# Patient Record
Sex: FEMALE | Race: WHITE | NOT HISPANIC OR LATINO | Employment: FULL TIME | URBAN - METROPOLITAN AREA
[De-identification: names, ages, dates, MRNs, and addresses within clinical notes are randomized per-mention and may not be internally consistent; named-entity substitution may affect disease eponyms.]

---

## 2017-01-25 ENCOUNTER — TRANSFERRED RECORDS (OUTPATIENT)
Dept: HEALTH INFORMATION MANAGEMENT | Facility: CLINIC | Age: 52
End: 2017-01-25

## 2017-05-17 DIAGNOSIS — F32.81 PMDD (PREMENSTRUAL DYSPHORIC DISORDER): ICD-10-CM

## 2017-05-18 NOTE — TELEPHONE ENCOUNTER
lexapro      Last Written Prescription Date: 11/23/16  Last Fill Quantity: 90, # refills: 1  Last Office Visit with [facility] primary care provider:  11/23/16        Last PHQ-9 score on record=   PHQ-9 SCORE 11/24/2016   Total Score -   Total Score 0

## 2017-05-18 NOTE — TELEPHONE ENCOUNTER
Routing refill request to provider for review/approval because:  Dx of PMDD not on refill protocol.     Irasema Torres RN   [facility] - Triage

## 2017-05-19 RX ORDER — ESCITALOPRAM OXALATE 20 MG/1
TABLET ORAL
Qty: 90 TABLET | Refills: 1 | Status: SHIPPED | OUTPATIENT
Start: 2017-05-19 | End: 2017-11-02

## 2017-08-05 ENCOUNTER — HEALTH MAINTENANCE LETTER (OUTPATIENT)
Age: 52
End: 2017-08-05

## 2017-11-02 DIAGNOSIS — F32.81 PMDD (PREMENSTRUAL DYSPHORIC DISORDER): ICD-10-CM

## 2017-11-02 RX ORDER — ESCITALOPRAM OXALATE 20 MG/1
TABLET ORAL
Qty: 30 TABLET | Refills: 0 | Status: SHIPPED | OUTPATIENT
Start: 2017-11-02 | End: 2018-04-20

## 2017-11-02 NOTE — TELEPHONE ENCOUNTER
Requested Prescriptions   Pending Prescriptions Disp Refills     escitalopram (LEXAPRO) 20 MG tablet 90 tablet 1    SSRIs Protocol Failed    11/2/2017 10:21 AM       Failed - PHQ-9 score less than 5 in past 6 months    Please review PHQ-9 score.          Failed - Recent (6 mo) or future visit with authorizing provider's specialty    Patient had office visit in the last 6 months or has a visit in the next 30 days with authorizing provider.  See chart review.            Passed - Medication is NOT Bupropion    If the medication is Bupropion (Wellbutrin), and the patient is taking for smoking cessation; OK to refill.         Passed - Patient is age 18 or older       Passed - No active pregnancy on record       Passed - No positive pregnancy test in last 12 months        Routing refill request to provider for review/approval because:  DX PMDD not in protocol    Irasema Torres RN   [facility] - Triage

## 2017-11-02 NOTE — LETTER
[facility]   [address] [place] [address]   [phone]      November 10, 2017    Kaylyn Fischer  [address] [place] [address]            Dear Ms. Fischer    Your physician requires an office visit every 12 months in order to monitor your maintenance medication(s).  Your last office visit was on 11/23/16.  We have called into the pharmacy a 1 month refill of your medication(s) until you can be seen by your provider.  Please call the clinic at [phone] to schedule an appointment..        Sincerely,    [facility]

## 2017-11-02 NOTE — TELEPHONE ENCOUNTER
30 day refill ordered. She is due for health maintenance items, needs visit for physical and establish care with new provider.     Kary Patterson MD

## 2017-11-03 NOTE — TELEPHONE ENCOUNTER
left voicemail message for patient to contact Main Clinic Number to schedule.  NTBS: needs visit for physical and establish care with new provider  Mary ANTONIO

## 2017-11-07 NOTE — TELEPHONE ENCOUNTER
2nd message left for pt to call back.    NTBS: needs visit for physical and establish care with new provider    Betsy Flower CMA

## 2017-11-11 DIAGNOSIS — F32.81 PMDD (PREMENSTRUAL DYSPHORIC DISORDER): ICD-10-CM

## 2017-11-13 RX ORDER — ESCITALOPRAM OXALATE 20 MG/1
TABLET ORAL
Qty: 30 TABLET | OUTPATIENT
Start: 2017-11-13

## 2017-11-13 NOTE — TELEPHONE ENCOUNTER
Mediation declined. Medication refilled for 30 tabs, also needs visit for further refills - see refill encounter 11/2/17.     Kary Patterson MD

## 2017-11-13 NOTE — TELEPHONE ENCOUNTER
Routing refill request to provider for review/approval because:  Dx of PMDD not on refill protocol.      Irasema Torres RN   [facility] - Triage

## 2018-04-20 ENCOUNTER — OFFICE VISIT (OUTPATIENT)
Dept: FAMILY MEDICINE | Facility: CLINIC | Age: 53
End: 2018-04-20
Payer: COMMERCIAL

## 2018-04-20 VITALS
OXYGEN SATURATION: 100 % | SYSTOLIC BLOOD PRESSURE: 112 MMHG | HEART RATE: 77 BPM | DIASTOLIC BLOOD PRESSURE: 72 MMHG | BODY MASS INDEX: 24.4 KG/M2 | WEIGHT: 161 LBS | HEIGHT: 68 IN | TEMPERATURE: 97.8 F

## 2018-04-20 DIAGNOSIS — A60.04 HERPES SIMPLEX VULVOVAGINITIS: ICD-10-CM

## 2018-04-20 DIAGNOSIS — Z12.4 CERVICAL CANCER SCREENING: ICD-10-CM

## 2018-04-20 DIAGNOSIS — F32.81 PMDD (PREMENSTRUAL DYSPHORIC DISORDER): Primary | ICD-10-CM

## 2018-04-20 PROCEDURE — 99213 OFFICE O/P EST LOW 20 MIN: CPT | Performed by: NURSE PRACTITIONER

## 2018-04-20 PROCEDURE — 87624 HPV HI-RISK TYP POOLED RSLT: CPT | Performed by: NURSE PRACTITIONER

## 2018-04-20 PROCEDURE — G0145 SCR C/V CYTO,THINLAYER,RESCR: HCPCS | Performed by: NURSE PRACTITIONER

## 2018-04-20 RX ORDER — ESCITALOPRAM OXALATE 20 MG/1
TABLET ORAL
Qty: 90 TABLET | Refills: 3 | Status: SHIPPED | OUTPATIENT
Start: 2018-04-20 | End: 2019-02-27

## 2018-04-20 RX ORDER — VALACYCLOVIR HYDROCHLORIDE 500 MG/1
500 TABLET, FILM COATED ORAL 2 TIMES DAILY
Qty: 12 TABLET | Refills: 3 | Status: SHIPPED | OUTPATIENT
Start: 2018-04-20 | End: 2018-09-17

## 2018-04-20 ASSESSMENT — PAIN SCALES - GENERAL: PAINLEVEL: NO PAIN (0)

## 2018-04-20 NOTE — Clinical Note
Please abstract the following data from this visit with this patient into the appropriate field in Epic:  Mammogram done on this date: 1/25/17 (approximately), by this group: [facility], results were Normal.  See Care Everywhere

## 2018-04-20 NOTE — MR AVS SNAPSHOT
"              After Visit Summary   4/20/2018    Kaylyn Fischer    MRN: [id number]           Patient Information     Date Of Birth          1965        Visit Information        Provider Department      4/20/2018 1:00 PM Susana Bernstein APRN CNP [facility]        Today's Diagnoses     Cervical cancer screening    -  1    PMDD (premenstrual dysphoric disorder)        Herpes simplex vulvovaginitis           Follow-ups after your visit        Who to contact     If you have questions or need follow up information about today's clinic visit or your schedule please contact [facility] directly at [phone].  Normal or non-critical lab and imaging results will be communicated to you by MyChart, letter or phone within 4 business days after the clinic has received the results. If you do not hear from us within 7 days, please contact the clinic through [facility] or phone. If you have a critical or abnormal lab result, we will notify you by phone as soon as possible.  Submit refill requests through [facility] or call your pharmacy and they will forward the refill request to us. Please allow 3 business days for your refill to be completed.          Additional Information About Your Visit        MyChart Information     [facility] gives you secure access to your electronic health record. If you see a primary care provider, you can also send messages to your care team and make appointments. If you have questions, please call your primary care clinic.  If you do not have a primary care provider, please call [phone] and they will assist you.        Care EveryWhere ID     This is your Care EveryWhere ID. This could be used by other organizations to access your [facility] medical records  [id number]        Your Vitals Were     Pulse Temperature Height Pulse Oximetry Breastfeeding? BMI (Body Mass Index)    77 97.8  F (36.6  C) (Oral) 5' 8\" (1.727 m) 100% No 24.48 kg/m2       " Blood Pressure from Last 3 Encounters:   04/20/18 112/72   11/23/16 110/75   01/29/16 116/74    Weight from Last 3 Encounters:   04/20/18 161 lb (73 kg)   11/23/16 156 lb (70.8 kg)   01/28/16 156 lb (70.8 kg)              We Performed the Following     HPV High Risk Types DNA Cervical     Pap imaged thin layer screen with HPV - recommended age 30 - 65 years (select HPV order below)          Where to get your medicines      These medications were sent to [facility] - [address], [facility] [address]     Phone:  [phone]     escitalopram 20 MG tablet    valACYclovir 500 MG tablet          Primary Care Provider Office Phone # Fax #    Susana Bernstein APRNICOLE [facility] [phone]       [address] [facility] [address]        Equal Access to Services     JOCELYN CARLOS : Hadii ghassan ku hadasho Soomaali, waaxda luqadaha, qaybta kaalmada adeegyada, juan j brooks . So [facility] [phone].    ATENCIÓN: Si habla español, tiene a wilkinson disposición servicios gratuitos de asistencia lingüística. Llame al [phone].    We comply with applicable federal civil rights laws and [place] laws. We do not discriminate on the basis of race, color, national origin, age, disability, sex, sexual orientation, or gender identity.            Thank you!     Thank you for choosing [facility]  for your care. Our goal is always to provide you with excellent care. Hearing back from our patients is one way we can continue to improve our services. Please take a few minutes to complete the written survey that you may receive in the mail after your visit with us. Thank you!             Your Updated Medication List - Protect others around you: Learn how to safely use, store and throw away your medicines at www.disposemymeds.org.          This list is accurate as of 4/20/18  1:27 PM.  Always use your most recent  med list.                   Brand Name Dispense Instructions for use Diagnosis    calcium carbonate-vitamin D 500-400 MG-UNIT Tabs per tablet     180 tablet    Take 1 tablet by mouth 2 times daily        escitalopram 20 MG tablet    LEXAPRO    90 tablet    Take 1 tablet by mouth  daily    PMDD (premenstrual dysphoric disorder)       valACYclovir 500 MG tablet    VALTREX    12 tablet    Take 1 tablet (500 mg) by mouth 2 times daily X 3 days per out break    Herpes simplex vulvovaginitis

## 2018-04-20 NOTE — Clinical Note
Patient had a mammogram in 2017 through [facility]. It was already abstracted into chart but her HM is showing she is still due for mammogram. Can you update please?

## 2018-04-20 NOTE — PROGRESS NOTES
SUBJECTIVE:   Kaylyn Fischer is a 52 year old female who presents to clinic today for the following health issues:      Medication Followup of Lexapro and Valtrex    Taking Medication as prescribed: yes    Side Effects:  None    Medication Helping Symptoms:  yes     She is here to establish care  Hx PMDD for which she takes Lexapro  Has been on for several years  Tolerates without side effects  Tried 10 mg with 20 mg around cycle but has found consistent use of 20 mg daily works better  Denies depression or anxiety  PHQ-2 Score:     PHQ-2 ( 1999 Pfizer) 4/20/2018 11/23/2016   Q1: Little interest or pleasure in doing things 0 0   Q2: Feeling down, depressed or hopeless 0 0   PHQ-2 Score 0 0         Also requests refill of Valtrex for infrequent genital herpes outbreaks    She has biometric screening at her work annually  She denies other concerns today      Problem list and histories reviewed & adjusted, as indicated.  Additional history: none    Patient Active Problem List   Diagnosis     CARDIOVASCULAR SCREENING; LDL GOAL LESS THAN 160     PMDD (premenstrual dysphoric disorder)     Herpes simplex vulvovaginitis     Past Surgical History:   Procedure Laterality Date     C NONSPECIFIC PROCEDURE  9/2016    manipulation left shoulder      COLONOSCOPY N/A 1/29/2016     COLONOSCOPY WITH CO2 INSUFFLATION N/A 1/29/2016    NL, repeat 10 years        Social History   Substance Use Topics     Smoking status: Former Smoker     Packs/day: 0.50     Years: 20.00     Types: Cigarettes     Quit date: 2/5/2003     Smokeless tobacco: Never Used     Alcohol use 0.0 oz/week     0 Standard drinks or equivalent per week      Comment: 7 drinks week     Family History   Problem Relation Age of Onset     HEART DISEASE Sister      PVC, tx'd beta blocker         Current Outpatient Prescriptions   Medication Sig Dispense Refill     calcium carbonate-vitamin D 500-400 MG-UNIT TABS tablt Take 1 tablet by mouth 2 times daily 180 tablet 3      "escitalopram (LEXAPRO) 20 MG tablet Take 1 tablet by mouth  daily 90 tablet 3     valACYclovir (VALTREX) 500 MG tablet Take 1 tablet (500 mg) by mouth 2 times daily X 3 days per out break 12 tablet 3       Reviewed and updated as needed this visit by clinical staff  Tobacco  Meds  Med Hx  Surg Hx  Fam Hx  Soc Hx      Reviewed and updated as needed this visit by Provider         ROS:  Constitutional, HEENT, cardiovascular, pulmonary, gi and gu systems are negative, except as otherwise noted.    OBJECTIVE:     /72 (BP Location: Right arm, Patient Position: Chair, Cuff Size: Adult Regular)  Pulse 77  Temp 97.8  F (36.6  C) (Oral)  Ht 5' 8\" (1.727 m)  Wt 161 lb (73 kg)  SpO2 100%  Breastfeeding? No  BMI 24.48 kg/m2  Body mass index is 24.48 kg/(m^2).  GENERAL: healthy, alert and no distress  RESP: lungs clear to auscultation - no rales, rhonchi or wheezes  CV: regular rate and rhythm, normal S1 S2, no S3 or S4, no murmur, click or rub, no peripheral edema and peripheral pulses strong   (female): normal female external genitalia, normal urethral meatus, vaginal mucosa, normal cervix/adnexa/uterus without masses or discharge  PSYCH: mentation appears normal, affect normal/bright    Diagnostic Test Results:  none     ASSESSMENT/PLAN:       ICD-10-CM    1. PMDD (premenstrual dysphoric disorder) F32.81 escitalopram (LEXAPRO) 20 MG tablet   2. Herpes simplex vulvovaginitis A60.04 valACYclovir (VALTREX) 500 MG tablet   3. Cervical cancer screening Z12.4 Pap imaged thin layer screen with HPV - recommended age 30 - 65 years (select HPV order below)     HPV High Risk Types DNA Cervical       Medications refilled  Ok to follow up annually as symptoms have been well controlled for years. Follow up sooner if symptoms worsening  Health maintenance updated    ALISHA Galvez [facility]  "

## 2018-04-24 LAB
COPATH REPORT: NORMAL
PAP: NORMAL

## 2018-04-26 LAB
FINAL DIAGNOSIS: NORMAL
HPV HR 12 DNA CVX QL NAA+PROBE: NEGATIVE
HPV16 DNA SPEC QL NAA+PROBE: NEGATIVE
HPV18 DNA SPEC QL NAA+PROBE: NEGATIVE
SPECIMEN DESCRIPTION: NORMAL
SPECIMEN SOURCE CVX/VAG CYTO: NORMAL

## 2018-07-31 ENCOUNTER — TRANSFERRED RECORDS (OUTPATIENT)
Dept: HEALTH INFORMATION MANAGEMENT | Facility: CLINIC | Age: 53
End: 2018-07-31

## 2019-02-23 DIAGNOSIS — A60.04 HERPES SIMPLEX VULVOVAGINITIS: ICD-10-CM

## 2019-02-25 RX ORDER — VALACYCLOVIR HYDROCHLORIDE 500 MG/1
TABLET, FILM COATED ORAL
Qty: 18 TABLET | Refills: 3 | Status: SHIPPED | OUTPATIENT
Start: 2019-02-25 | End: 2019-09-19

## 2019-02-25 NOTE — TELEPHONE ENCOUNTER
"Requested Prescriptions   Pending Prescriptions Disp Refills     valACYclovir (VALTREX) 500 MG tablet [Pharmacy Med Name: VALACYCLOVIR  500MG  TAB] 18 tablet     Last Written Prescription Date:  9/18/18  Last Fill Quantity: 12,  # refills: 3   Last office visit: 4/20/2018 with prescribing provider:     Future Office Visit:     Sig: TAKE 1 TABLET BY MOUTH TWO  TIMES A DAY FOR 3 DAYS PER  OUTBREAK    Antivirals for Herpes Protocol Failed - 2/23/2019  8:03 PM       Failed - Normal serum creatinine on file in past 12 months    Recent Labs   Lab Test 06/15/15  1639   CR 0.82            Passed - Patient is age 12 or older       Passed - Recent (12 mo) or future (30 days) visit within the authorizing provider's specialty    Patient had office visit in the last 12 months or has a visit in the next 30 days with authorizing provider or within the authorizing provider's specialty.  See \"Patient Info\" tab in inbasket, or \"Choose Columns\" in Meds & Orders section of the refill encounter.             Passed - Medication is active on med list          "

## 2019-02-25 NOTE — TELEPHONE ENCOUNTER
Routing refill request to provider for review/approval because:  Labs not current.  Alecia Hirsch RN [facility] Triage.

## 2019-02-27 DIAGNOSIS — F32.81 PMDD (PREMENSTRUAL DYSPHORIC DISORDER): ICD-10-CM

## 2019-02-28 RX ORDER — ESCITALOPRAM OXALATE 20 MG/1
TABLET ORAL
Qty: 90 TABLET | Refills: 0 | Status: SHIPPED | OUTPATIENT
Start: 2019-02-28 | End: 2019-05-10

## 2019-02-28 NOTE — TELEPHONE ENCOUNTER
Medication is being filled for 1 time refill only due to:  Patient needs to be seen because it has been more than one year since last visit. Patient due in April for appt.  Loreta Salgado RN

## 2019-02-28 NOTE — TELEPHONE ENCOUNTER
"Requested Prescriptions   Pending Prescriptions Disp Refills     escitalopram (LEXAPRO) 20 MG tablet [Pharmacy Med Name: ESCITALOPRAM  20MG  TAB] 90 tablet 3    Last Written Prescription Date:  4-20-18  Last Fill Quantity: 90,  # refills: 3   Last office visit: 4/20/2018 with prescribing provider:  4-20-18   Future Office Visit:     Sig: TAKE 1 TABLET BY MOUTH  DAILY    SSRIs Protocol Failed - 2/27/2019  8:01 PM       Failed - PHQ-9 score less than 5 in past 6 months    Please review last PHQ-9 score.          Failed - Recent (6 mo) or future (30 days) visit within the authorizing provider's specialty    Patient had office visit in the last 6 months or has a visit in the next 30 days with authorizing provider or within the authorizing provider's specialty.  See \"Patient Info\" tab in inbasket, or \"Choose Columns\" in Meds & Orders section of the refill encounter.           Passed - Medication is active on med list       Passed - Patient is age 18 or older       Passed - No active pregnancy on record       Passed - No positive pregnancy test in last 12 months          "

## 2019-05-10 DIAGNOSIS — F32.81 PMDD (PREMENSTRUAL DYSPHORIC DISORDER): ICD-10-CM

## 2019-05-13 RX ORDER — ESCITALOPRAM OXALATE 20 MG/1
TABLET ORAL
Qty: 30 TABLET | Refills: 0 | Status: SHIPPED | OUTPATIENT
Start: 2019-05-13 | End: 2019-09-19

## 2019-05-13 NOTE — TELEPHONE ENCOUNTER
Medication is being filled for 1 time refill only due to:  Patient needs to be seen because it has been more than one year since last visit.   Loreta Salgado RN

## 2019-05-13 NOTE — TELEPHONE ENCOUNTER
"Requested Prescriptions   Pending Prescriptions Disp Refills     escitalopram (LEXAPRO) 20 MG tablet [Pharmacy Med Name: ESCITALOPRAM  20MG  TAB] 90 tablet 0     Sig: TAKE 1 TABLET BY MOUTH  DAILY   Last Written Prescription Date:  2/28/19  Last Fill Quantity: 90,  # refills: 0   Last office visit: 4/20/2018 with prescribing provider:     Future Office Visit:        SSRIs Protocol Failed - 5/10/2019  8:02 PM        Failed - PHQ-9 score less than 5 in past 6 months     Please review last PHQ-9 score.           Failed - Recent (6 mo) or future (30 days) visit within the authorizing provider's specialty     Patient had office visit in the last 6 months or has a visit in the next 30 days with authorizing provider or within the authorizing provider's specialty.  See \"Patient Info\" tab in inbasket, or \"Choose Columns\" in Meds & Orders section of the refill encounter.            Passed - Medication is active on med list        Passed - Patient is age 18 or older        Passed - No active pregnancy on record        Passed - No positive pregnancy test in last 12 months          "

## 2019-06-25 ENCOUNTER — TRANSFERRED RECORDS (OUTPATIENT)
Dept: HEALTH INFORMATION MANAGEMENT | Facility: CLINIC | Age: 54
End: 2019-06-25

## 2019-09-19 ENCOUNTER — OFFICE VISIT (OUTPATIENT)
Dept: FAMILY MEDICINE | Facility: CLINIC | Age: 54
End: 2019-09-19
Payer: COMMERCIAL

## 2019-09-19 VITALS
BODY MASS INDEX: 23.42 KG/M2 | DIASTOLIC BLOOD PRESSURE: 78 MMHG | WEIGHT: 154 LBS | SYSTOLIC BLOOD PRESSURE: 132 MMHG | HEART RATE: 80 BPM | TEMPERATURE: 98.8 F | OXYGEN SATURATION: 100 %

## 2019-09-19 DIAGNOSIS — A60.04 HERPES SIMPLEX VULVOVAGINITIS: ICD-10-CM

## 2019-09-19 DIAGNOSIS — F32.81 PMDD (PREMENSTRUAL DYSPHORIC DISORDER): ICD-10-CM

## 2019-09-19 DIAGNOSIS — N95.1 VASOMOTOR SYMPTOMS DUE TO MENOPAUSE: Primary | ICD-10-CM

## 2019-09-19 PROCEDURE — 99214 OFFICE O/P EST MOD 30 MIN: CPT | Performed by: NURSE PRACTITIONER

## 2019-09-19 RX ORDER — ESTRADIOL 0.05 MG/D
1 PATCH TRANSDERMAL WEEKLY
Qty: 12 PATCH | Refills: 3 | Status: SHIPPED | OUTPATIENT
Start: 2019-09-19 | End: 2020-07-16

## 2019-09-19 RX ORDER — ESCITALOPRAM OXALATE 20 MG/1
20 TABLET ORAL DAILY
Qty: 90 TABLET | Refills: 3 | Status: SHIPPED | OUTPATIENT
Start: 2019-09-19 | End: 2021-10-01

## 2019-09-19 RX ORDER — VALACYCLOVIR HYDROCHLORIDE 500 MG/1
TABLET, FILM COATED ORAL
Qty: 18 TABLET | Refills: 3 | Status: SHIPPED | OUTPATIENT
Start: 2019-09-19 | End: 2020-09-14

## 2019-09-19 ASSESSMENT — PAIN SCALES - GENERAL: PAINLEVEL: NO PAIN (0)

## 2019-09-19 NOTE — PROGRESS NOTES
Subjective     Kaylyn Fischer is a 54 year old female who presents to clinic today for the following health issues:    HPI   Medication Followup of Lexapro and Valtrex    Taking Medication as prescribed: yes    Side Effects:  None    Medication Helping Symptoms:  yes   Patient is also her today to discuss hormone replacement , having hot flashes.      She is requesting refills of her medications today  She has been on Lexapro for several years  Rarely misses a dose  She has tried going off of it in the past, but depression returned  PHQ-9 score:    PHQ-9 SCORE 2016   PHQ-9 Total Score -   PHQ-9 Total Score 0       She went through menopause approximately 3 years ago  She has had worsening hot flashes this past year, they make it difficult to fall asleep at night and stay asleep  She is interested in starting HRT  Her wife had success with HRT  She does not smoke  No family history of cancer  She is overdue for her mammogram and likes to get it done at [facility]  She had labwork done through work and brought it in today for review  Normal cholesterol and fasting glucose          Patient Active Problem List   Diagnosis     CARDIOVASCULAR SCREENING; LDL GOAL LESS THAN 160     PMDD (premenstrual dysphoric disorder)     Herpes simplex vulvovaginitis     Past Surgical History:   Procedure Laterality Date     C NONSPECIFIC PROCEDURE  2016    manipulation left shoulder      COLONOSCOPY N/A 2016     COLONOSCOPY WITH CO2 INSUFFLATION N/A 2016    NL, repeat 10 years        Social History     Tobacco Use     Smoking status: Former Smoker     Packs/day: 0.50     Years: 20.00     Pack years: 10.00     Types: Cigarettes     Last attempt to quit: 2003     Years since quittin.6     Smokeless tobacco: Never Used   Substance Use Topics     Alcohol use: Yes     Alcohol/week: 0.0 oz     Comment: 7 drinks week     Family History   Problem Relation Age of Onset     Heart Disease Sister         PVC,  tx'd beta blocker             Reviewed and updated as needed this visit by Provider         Review of Systems   ROS COMP: Constitutional, HEENT, cardiovascular, pulmonary, gi and gu systems are negative, except as otherwise noted.      Objective    /78 (BP Location: Right arm, Patient Position: Chair, Cuff Size: Adult Regular)   Pulse 80   Temp 98.8  F (37.1  C) (Oral)   Wt 69.9 kg (154 lb)   SpO2 100%   Breastfeeding? No   BMI 23.42 kg/m    Body mass index is 23.42 kg/m .  Physical Exam   GENERAL: healthy, alert and no distress  RESP: lungs clear to auscultation - no rales, rhonchi or wheezes  CV: regular rate and rhythm, normal S1 S2, no S3 or S4, no murmur, click or rub, no peripheral edema and peripheral pulses strong  PSYCH: mentation appears normal, affect normal/bright    Diagnostic Test Results:  Labs reviewed in Epic        Assessment & Plan       ICD-10-CM    1. Vasomotor symptoms due to menopause N95.1 estradiol (CLIMARA) 0.05 MG/24HR weekly patch   2. PMDD (premenstrual dysphoric disorder) F32.81 escitalopram (LEXAPRO) 20 MG tablet   3. Herpes simplex vulvovaginitis A60.04 valACYclovir (VALTREX) 500 MG tablet        Discussed treatment options for vasomotor symptoms including venlafaxine, paroxetine and HRT, including risks and benefits  She would like HRT patch      ALISHA Galvez [facility]

## 2019-11-05 ENCOUNTER — HEALTH MAINTENANCE LETTER (OUTPATIENT)
Age: 54
End: 2019-11-05

## 2019-12-12 ENCOUNTER — OFFICE VISIT (OUTPATIENT)
Dept: FAMILY MEDICINE | Facility: CLINIC | Age: 54
End: 2019-12-12
Payer: COMMERCIAL

## 2019-12-12 VITALS
SYSTOLIC BLOOD PRESSURE: 128 MMHG | BODY MASS INDEX: 23.82 KG/M2 | DIASTOLIC BLOOD PRESSURE: 80 MMHG | WEIGHT: 157.2 LBS | OXYGEN SATURATION: 99 % | HEART RATE: 94 BPM | TEMPERATURE: 99.5 F | HEIGHT: 68 IN

## 2019-12-12 DIAGNOSIS — J20.9 ACUTE BRONCHITIS WITH SYMPTOMS > 10 DAYS: Primary | ICD-10-CM

## 2019-12-12 PROCEDURE — 99213 OFFICE O/P EST LOW 20 MIN: CPT | Performed by: PHYSICIAN ASSISTANT

## 2019-12-12 RX ORDER — PREDNISONE 20 MG/1
20 TABLET ORAL 2 TIMES DAILY
Qty: 10 TABLET | Refills: 0 | Status: SHIPPED | OUTPATIENT
Start: 2019-12-12 | End: 2019-12-17

## 2019-12-12 RX ORDER — AZITHROMYCIN 250 MG/1
TABLET, FILM COATED ORAL
Qty: 6 TABLET | Refills: 0 | Status: SHIPPED | OUTPATIENT
Start: 2019-12-12 | End: 2019-12-17

## 2019-12-12 ASSESSMENT — MIFFLIN-ST. JEOR: SCORE: 1361.55

## 2019-12-12 NOTE — PROGRESS NOTES
"Subjective     Kaylyn Fischer is a 54 year old female who presents to clinic today for the following health issues:    HPI   Acute Illness   Acute illness concerns: URI  Onset: x2-3 weeks    Fever: YES -39.5C this am.     Chills/Sweats: YES    Headache (location?): YES    Sinus Pressure:YES    Conjunctivitis:  no    Ear Pain: no    Rhinorrhea: YES    Congestion: YES    Sore Throat: no     Cough: YES-productive of clear sputum    Wheeze: no     Decreased Appetite: YES    Nausea: no     Vomiting: no     Diarrhea:  no     Dysuria/Freq.: no     Fatigue/Achiness: YES    Sick/Strep Exposure: YES     Therapies Tried and outcome: OTC Nyquil     Hasn't taken any meds today. If she tries to breathe a deep breath it feels like breathing crushed glass.   Productive cough.   Slight runny nose.   No stomach upset.       Reviewed and updated as needed this visit by Provider  Tobacco  Allergies  Meds  Problems  Med Hx  Surg Hx  Fam Hx         Review of Systems   ROS COMP: Constitutional, HEENT, cardiovascular, pulmonary, gi and gu systems are negative, except as otherwise noted.      Objective    /80 (BP Location: Right arm, Patient Position: Chair, Cuff Size: Adult Regular)   Pulse 94   Temp 99.5  F (37.5  C) (Oral)   Ht 1.727 m (5' 8\")   Wt 71.3 kg (157 lb 3.2 oz)   SpO2 99%   Breastfeeding No   BMI 23.90 kg/m    Body mass index is 23.9 kg/m .  Physical Exam   GENERAL: healthy, alert and no distress  HENT: ear canals and TM's normal, nose and mouth without ulcers or lesions  NECK: no adenopathy,   RESP: right lower lobe with rhonchi, otherwise clear throughout.   CV: regular rate and rhythm, normal S1 S2, no S3 or S4, no murmur,     Diagnostic Test Results:  none         Assessment & Plan       ICD-10-CM    1. Acute bronchitis with symptoms > 10 days J20.9 predniSONE (DELTASONE) 20 MG tablet     azithromycin (ZITHROMAX) 250 MG tablet     Persistent symptoms with low grade temp. Will treat with antibiotics and " prednisone. return to clinic if not improving as expected.          Return in about 1 week (around 12/19/2019) for follow up only as needed if symptoms worsen.    Maryam Garcia PA-C  [facility]

## 2020-07-16 DIAGNOSIS — N95.1 VASOMOTOR SYMPTOMS DUE TO MENOPAUSE: ICD-10-CM

## 2020-07-16 RX ORDER — ESTRADIOL 0.05 MG/D
PATCH TRANSDERMAL
Qty: 12 PATCH | Refills: 0 | Status: SHIPPED | OUTPATIENT
Start: 2020-07-16 | End: 2020-09-23

## 2020-07-16 NOTE — TELEPHONE ENCOUNTER
Routing refill request to provider for review/approval because:  Per RN protocol needs kim Díaz, RN

## 2020-07-16 NOTE — TELEPHONE ENCOUNTER
Please let her know I sent in a refill  Will be due for annual follow up in September  I now practice at [facility], or can schedule with new provider and establish care  She is overdue for mammogram which I strongly recommend while on HRT  Last done 2017 though I know she gets it done at [facility], so ask if she's had it and sent to abstracting if appropriate  Otherwise can offer to schedule within [place]

## 2020-09-23 ENCOUNTER — OFFICE VISIT (OUTPATIENT)
Dept: FAMILY MEDICINE | Facility: CLINIC | Age: 55
End: 2020-09-23
Payer: COMMERCIAL

## 2020-09-23 VITALS
SYSTOLIC BLOOD PRESSURE: 113 MMHG | TEMPERATURE: 97.8 F | OXYGEN SATURATION: 99 % | BODY MASS INDEX: 23.96 KG/M2 | DIASTOLIC BLOOD PRESSURE: 74 MMHG | WEIGHT: 157.6 LBS | HEART RATE: 76 BPM

## 2020-09-23 DIAGNOSIS — Z23 NEED FOR PROPHYLACTIC VACCINATION AND INOCULATION AGAINST INFLUENZA: ICD-10-CM

## 2020-09-23 DIAGNOSIS — Z23 NEED FOR SHINGLES VACCINE: ICD-10-CM

## 2020-09-23 DIAGNOSIS — N95.1 VASOMOTOR SYMPTOMS DUE TO MENOPAUSE: Primary | ICD-10-CM

## 2020-09-23 DIAGNOSIS — A60.04 HERPES SIMPLEX VULVOVAGINITIS: ICD-10-CM

## 2020-09-23 PROCEDURE — 90682 RIV4 VACC RECOMBINANT DNA IM: CPT | Performed by: FAMILY MEDICINE

## 2020-09-23 PROCEDURE — 90471 IMMUNIZATION ADMIN: CPT | Performed by: FAMILY MEDICINE

## 2020-09-23 PROCEDURE — 90472 IMMUNIZATION ADMIN EACH ADD: CPT | Performed by: FAMILY MEDICINE

## 2020-09-23 PROCEDURE — 99213 OFFICE O/P EST LOW 20 MIN: CPT | Mod: 25 | Performed by: FAMILY MEDICINE

## 2020-09-23 PROCEDURE — 90750 HZV VACC RECOMBINANT IM: CPT | Performed by: FAMILY MEDICINE

## 2020-09-23 RX ORDER — VALACYCLOVIR HYDROCHLORIDE 500 MG/1
TABLET, FILM COATED ORAL
Qty: 30 TABLET | Refills: 6 | Status: SHIPPED | OUTPATIENT
Start: 2020-09-23 | End: 2021-10-01

## 2020-09-23 RX ORDER — ESTRADIOL 0.05 MG/D
PATCH TRANSDERMAL
Qty: 12 PATCH | Refills: 11 | Status: SHIPPED | OUTPATIENT
Start: 2020-09-23 | End: 2021-11-15

## 2020-09-23 NOTE — PROGRESS NOTES
Subjective     Kaylyn Fischer is a 55 year old female who presents to clinic today for the following health issues:    HPI   Patient comes to have her medications renewed.  She does have history of vasomotor symptom due to menopause.  Currently,  she is on Lexapro however she is weaning down herself.  She is using estradiol patches once a week and just seemed to help her symptoms.    Lexapro currently she is taking 10 mg and she is weaning herself.    History of frequent herpes sore, orally she is to take Valtrex as needed.    She does need to have influenza vaccine today, and Shringix .    Patient reports she is up-to-date with all her screening she gets mammogram, blood work through work    Denies depression, panic attack, denies alcohol abuse or drugs abuse.  Denies side effect with the medication.    Medication Followup of lexapro    Taking Medication as prescribed: NO    Side Effects:  None    Medication Helping Symptoms:  Yes  Need a patch instead of lexapro          Review of Systems   Constitutional, HEENT, cardiovascular, pulmonary, gi and gu systems are negative, except as otherwise noted.      Objective    There were no vitals taken for this visit.  There is no height or weight on file to calculate BMI.  Physical Exam   GENERAL: healthy, alert and no distress  PSYCH: mentation appears normal, affect normal/bright    Orders Placed This Encounter   Procedures     INFLUENZA QUAD, RECOMBINANT, P-FREE (RIV4) (FLUBLOCK) [98432]     Vaccine Administration, Initial [61887]     ZOSTER VACCINE RECOMBINANT ADJUVANTED IM NJX     VACCINE ADMINISTRATION, EACH ADDITIONAL           Assessment & Plan     Vasomotor symptoms due to menopause    - estradiol (CLIMARA) 0.05 MG/24HR weekly patch; APPLY 1 PATCH TOPICALLY TO  SKIN ONCE A WEEK    Need for prophylactic vaccination and inoculation against influenza    - INFLUENZA QUAD, RECOMBINANT, P-FREE (RIV4) (FLUBLOCK) [39056]  - Vaccine Administration, Initial  [02713]    Herpes simplex vulvovaginitis    - valACYclovir (VALTREX) 500 MG tablet; TAKE 1 TABLET BY MOUTH 2  TIMES DAILY FOR 3 DAY PER  OUTBREAK    Need for shingles vaccine    - ZOSTER VACCINE RECOMBINANT ADJUVANTED IM NJX  - VACCINE ADMINISTRATION, EACH ADDITIONAL     She reports she gets her blood work, mammogram, breast cancer screening through work.  She is up-to-date with her colonoscopy.    There are no Patient Instructions on file for this visit.    Return for Physical Exam.    Geronimo Mata MD  [facility]

## 2020-09-23 NOTE — NURSING NOTE
Prior to immunization administration, verified patients identity using patient s name and date of birth. Please see Immunization Activity for additional information.     Screening Questionnaire for Adult Immunization    Are you sick today?   No   Do you have allergies to medications, food, a vaccine component or latex?   No   Have you ever had a serious reaction after receiving a vaccination?   No   Do you have a long-term health problem with heart, lung, kidney, or metabolic disease (e.g., diabetes), asthma, a blood disorder, no spleen, complement component deficiency, a cochlear implant, or a spinal fluid leak?  Are you on long-term aspirin therapy?   No   Do you have cancer, leukemia, HIV/AIDS, or any other immune system problem?   No   Do you have a parent, brother, or sister with an immune system problem?   No   In the past 3 months, have you taken medications that affect  your immune system, such as prednisone, other steroids, or anticancer drugs; drugs for the treatment of rheumatoid arthritis, Crohn s disease, or psoriasis; or have you had radiation treatments?   No   Have you had a seizure, or a brain or other nervous system problem?   No   During the past year, have you received a transfusion of blood or blood    products, or been given immune (gamma) globulin or antiviral drug?   No   For women: Are you pregnant or is there a chance you could become       pregnant during the next month?   No   Have you received any vaccinations in the past 4 weeks?   No     Immunization questionnaire answers were all negative.        Per orders of Dr. Mata, injection of Shingrix and flu given by Tamy Costa MA. Patient instructed to remain in clinic for 15 minutes afterwards, and to report any adverse reaction to me immediately.       Screening performed by Tamy Costa MA on 9/23/2020 at 9:14 AM.

## 2020-10-05 ENCOUNTER — VIRTUAL VISIT (OUTPATIENT)
Dept: FAMILY MEDICINE | Facility: CLINIC | Age: 55
End: 2020-10-05
Payer: COMMERCIAL

## 2020-10-05 DIAGNOSIS — H91.8X2 OTHER HEARING LOSS OF LEFT EAR WITH UNRESTRICTED HEARING OF RIGHT EAR: Primary | ICD-10-CM

## 2020-10-05 PROCEDURE — 99213 OFFICE O/P EST LOW 20 MIN: CPT | Mod: TEL | Performed by: PHYSICIAN ASSISTANT

## 2020-10-05 NOTE — PROGRESS NOTES
"Kaylyn Fischer is a 55 year old female who is being evaluated via a billable telephone visit.      The patient has been notified of following:     \"This telephone visit will be conducted via a call between you and your physician/provider. We have found that certain health care needs can be provided without the need for a physical exam.  This service lets us provide the care you need with a short phone conversation.  If a prescription is necessary we can send it directly to your pharmacy.  If lab work is needed we can place an order for that and you can then stop by our lab to have the test done at a later time.    Telephone visits are billed at different rates depending on your insurance coverage. During this emergency period, for some insurers they may be billed the same as an in-person visit.  Please reach out to your insurance provider with any questions.    If during the course of the call the physician/provider feels a telephone visit is not appropriate, you will not be charged for this service.\"    Patient has given verbal consent for Telephone visit?  Yes    What phone number would you like to be contacted at? [phone]    How would you like to obtain your AVS? Dequan    Subjective     Kaylyn Fischer is a 55 year old female who presents via phone visit today for the following health issues:    HPI     Patient presents via a phone visit to discuss hearing loss. Reports this has been ongoing for a number of years. She notices it quite a bit in her left ear  - has ringing in the ear as well.        Review of Systems   Constitutional, HEENT, cardiovascular, pulmonary, gi and gu systems are negative, except as otherwise noted.       Objective          Vitals:  No vitals were obtained today due to virtual visit.    healthy, alert and no distress  PSYCH: Alert and oriented times 3; coherent speech, normal   rate and volume, able to articulate logical thoughts, able   to abstract reason, no tangential thoughts, " no hallucinations   or delusions  Her affect is normal  RESP: No cough, no audible wheezing, able to talk in full sentences  Remainder of exam unable to be completed due to telephone visits          Assessment/Plan:    Assessment & Plan     Other hearing loss of left ear with unrestricted hearing of right ear  Full exam unable to be performed today as patient is being seen via a telephone encounter. Patient is looking for audiology referral. With ongoing symptoms for many years, reasonable to start with audiology. Discussed possibility of needing ENT referral depending on findings with audiology. Patient agrees with the plan and has no additional questions.   - AUDIOLOGY ADULT REFERRAL; Future          Return if symptoms worsen or fail to improve.    Irasema Lopez PA-C  [facility]    Phone call duration:  6 minutes

## 2020-10-16 ENCOUNTER — OFFICE VISIT (OUTPATIENT)
Dept: AUDIOLOGY | Facility: CLINIC | Age: 55
End: 2020-10-16
Payer: COMMERCIAL

## 2020-10-16 DIAGNOSIS — H91.8X2 OTHER HEARING LOSS OF LEFT EAR WITH UNRESTRICTED HEARING OF RIGHT EAR: ICD-10-CM

## 2020-10-16 DIAGNOSIS — H90.42 SENSORINEURAL HEARING LOSS (SNHL) OF LEFT EAR WITH UNRESTRICTED HEARING OF RIGHT EAR: Primary | ICD-10-CM

## 2020-10-16 PROCEDURE — 92565 STENGER TEST PURE TONE: CPT | Performed by: AUDIOLOGIST

## 2020-10-16 PROCEDURE — 92557 COMPREHENSIVE HEARING TEST: CPT | Performed by: AUDIOLOGIST

## 2020-10-16 PROCEDURE — 92550 TYMPANOMETRY & REFLEX THRESH: CPT | Performed by: AUDIOLOGIST

## 2020-10-16 NOTE — PROGRESS NOTES
AUDIOLOGY REPORT    SUBJECTIVE: Kaylyn Fischer is a 55 year old female who was seen in the Audiology Clinic at [facility] for audiologic evaluation, referred by Irasema Lopez PA-C. The patient reports left hearing loss for many years as well as left tinnitus that is bothersome at times. She denies bilateral pain, bilateral drainage, dizziness, or a history of ear surgeries.    OBJECTIVE:  Abuse Screening:  Do you feel unsafe at home or work/school? No  Do you feel threatened by someone? No  Does anyone try to keep you from having contact with others, or doing things outside of your home? No  Physical signs of abuse present? No     Fall Risk Screening:  Have you fallen two or more times in the past year? No  Have you fallen and had an injury in the past year? No    Otoscopic exam indicated ears are clear of cerumen bilaterally     Pure Tone Thresholds assessed using conventional audiometry with good reliability from 250-8000 Hz bilaterally using insert earphones and circumaural headphones     RIGHT:  Normal hearing    LEFT:    Normal hearing sloping to moderate sensorineural hearing loss    Tympanogram:    RIGHT: Normal eardrum mobility    LEFT:   Shallow eardrum mobility    Reflexes (reported by stimulus ear):    RIGHT: Ipsilateral is present at normal levels    RIGHT: Contralateral is present at normal levels    LEFT:   Ipsilateral is present at normal levels    LEFT:   Contralateral is present at normal levels    Speech Reception Threshold:    RIGHT: 10 dB HL    LEFT:   10 dB HL    Word Recognition Score:     RIGHT: 100% at 50 dB HL using NU-6 recorded word list    LEFT:   100% at 55 dB HL using NU-6 recorded word list    ASSESSMENT: Normal hearing right and sensorineural hearing loss left. Asymmetry noted at 0717-9581 Hz. Today s results were discussed with the patient in detail.     PLAN: It is recommended that the patient follow-up with an Ear, Nose, and  Throat physician regarding the left asymmetric sensorineural hearing loss noted today. She was counseled regarding hearing loss and impact on communication. She may consider a trial with a left hearing aid if so desired pending medical clearance. Please call this clinic with questions regarding these results or recommendations.      Drake Kaplan, [facility]  Licensed Audiologist  [place] #[id number]      CC: Irasema Lopez PA-C

## 2020-10-19 ENCOUNTER — DOCUMENTATION ONLY (OUTPATIENT)
Dept: CARE COORDINATION | Facility: CLINIC | Age: 55
End: 2020-10-19

## 2020-10-19 NOTE — TELEPHONE ENCOUNTER
FUTURE VISIT INFORMATION      FUTURE VISIT INFORMATION:    Date: 11/13/2020    Time: 11:20AM    [place] [facility] [place]  REFERRAL INFORMATION:    Referring provider:      Referring providers clinic:      Reason for visit/diagnosis  left asymmetric SNHL per Giulia Flowers    RECORDS REQUESTED FROM:       Clinic name Comments Records Status Imaging Status   [facility]  10/16/220 Audiogram and note from Giulia [facility]    [facility]  10/5/2020 note from Irasema Lopez PA-C   Epic

## 2020-11-13 ENCOUNTER — PRE VISIT (OUTPATIENT)
Dept: OTOLARYNGOLOGY | Facility: CLINIC | Age: 55
End: 2020-11-13

## 2020-11-13 ENCOUNTER — OFFICE VISIT (OUTPATIENT)
Dept: OTOLARYNGOLOGY | Facility: CLINIC | Age: 55
End: 2020-11-13
Payer: COMMERCIAL

## 2020-11-13 VITALS
DIASTOLIC BLOOD PRESSURE: 76 MMHG | BODY MASS INDEX: 23.34 KG/M2 | HEIGHT: 68 IN | SYSTOLIC BLOOD PRESSURE: 123 MMHG | WEIGHT: 154 LBS | RESPIRATION RATE: 16 BRPM | TEMPERATURE: 97.3 F | HEART RATE: 74 BPM | OXYGEN SATURATION: 98 %

## 2020-11-13 DIAGNOSIS — H90.3 ASYMMETRICAL SENSORINEURAL HEARING LOSS: Primary | ICD-10-CM

## 2020-11-13 DIAGNOSIS — J44.9 CHRONIC OBSTRUCTIVE PULMONARY DISEASE, UNSPECIFIED COPD TYPE (H): ICD-10-CM

## 2020-11-13 PROCEDURE — 99204 OFFICE O/P NEW MOD 45 MIN: CPT | Performed by: OTOLARYNGOLOGY

## 2020-11-13 ASSESSMENT — MIFFLIN-ST. JEOR: SCORE: 1342.04

## 2020-11-13 ASSESSMENT — PAIN SCALES - GENERAL: PAINLEVEL: NO PAIN (0)

## 2020-11-13 NOTE — PATIENT INSTRUCTIONS
1. You were seen in the ENT Clinic today by .  If you have any questions or concerns after your appointment, please call   - Option 1: ENT Clinic: [phone]  - Option 2: Kimmy (' Nurse): [phone]    2.   Plan to return to clinic for your MRI     ERIK Oneal  Care Coordinator  [facility] Otolaryngology  [phone]

## 2020-11-13 NOTE — PROGRESS NOTES
"November 13, 2020    I had the pleasure of meeting Kaylyn Fischer today in consultation for left asymmetric sensorineural hearing loss with accompanying tinnitus.    She is a 55-year-old woman who began noting that she was having more difficulty hearing out of her left ear once we started using masks during the Covid pandemic.  She has been aware of high-pitched ringing tinnitus in her left ear for many years and also for many years has had some hearing loss that she has been aware of.  At times the tinnitus has become bothersome but oftentimes she is able to manage it.  She finds that the hearing loss and the tinnitus interfere with certain sounds, specifically \"th\" and \"sh\".  She has difficulty hearing in noisy restaurants.  While she is aware of the left tinnitus being lateralizing she does not notice a difference in hearing between her 2 ears.  This all came on gradually and there been no sudden hearing losses.  The tinnitus is not keeping her from living her daily life nor is it associated with significant distress or depression.    She has not had any recent dizziness.  She had one spell of spinning after head turning and was told was related to some crystals but it has been many years since that was an occurrence. Denies pain, drainage, or a history of ear surgeries.      Past Medical History:   Diagnosis Date     Adhesive capsulitis of right shoulder 4/2016 & 9/2016    cortisone injection & manipulation with anesthesia      Genital herpes     Valtrex     Genital warts     Aldara     PMDD (premenstrual dysphoric disorder) 2010    Lexapro       Past Surgical History:   Procedure Laterality Date     COLONOSCOPY N/A 1/29/2016     COLONOSCOPY WITH CO2 INSUFFLATION N/A 1/29/2016    NL, repeat 10 years      Z NONSPECIFIC PROCEDURE  9/2016    manipulation left shoulder        Current Outpatient Medications   Medication     calcium carbonate-vitamin D 500-400 MG-UNIT TABS tablt     escitalopram (LEXAPRO) 20 MG " "tablet     estradiol (CLIMARA) 0.05 MG/24HR weekly patch     valACYclovir (VALTREX) 500 MG tablet     No current facility-administered medications for this visit.        Family History   Problem Relation Age of Onset     Heart Disease Sister         PVC, tx'd beta blocker       Social History     Tobacco Use     Smoking status: Former Smoker     Packs/day: 0.50     Years: 20.00     Pack years: 10.00     Types: Cigarettes     Quit date: 2003     Years since quittin.8     Smokeless tobacco: Never Used   Substance Use Topics     Alcohol use: Yes     Alcohol/week: 0.0 standard drinks     Comment: 7 drinks week     Drug use: No         Patient Supplied Answers to Review of Systems   ENT ROS 2020   Ears, Nose, Throat Ringing/noise in ears   Complete review of systems otherwise negative    Physical examination:  /76   Pulse 74   Temp 97.3  F (36.3  C)   Resp 16   Ht 1.727 m (5' 8\")   Wt 69.9 kg (154 lb)   SpO2 98%   BMI 23.42 kg/m    General: She appeared well, was unaccompanied, in no acute distress  Respiratory: Breathing without stridor or stertor  Neurologic: Patient to function normal bilaterally.  Alert and oriented x3.  Psychiatric: Affect is calm cooperative and appropriate  Skin: Warm pink and without lesions  Ears: External auditory canals lined with healthy skin with normal tympanic membranes and aerated middle ear spaces bilaterally.  512 Hz tuning fork lateralized to the right ear with air conduction louder than bone conduction bilaterally.    Audiogram:  Results: Right: Normal hearing.  Left: Normal hearing sloping to moderate SNHL. Asymmetry noted at 7728-7781 Hz. Negative Carey.  100% word recognition bilaterally  Tympanograms WNL right and shallow left.   Reflexes present at normal levels in all conditions.      Impression and plan:  1. Asymmetric left sensorineural hearing loss, moderate, with more than 20dB asymmetry at multiple frequencies.  No clear inciting event.    - " MRI IAC protocol to seek retrocochlear pathology  - Hearing aid consult after MRI  2. Left tinnitus, secondary to #1.  - reviewed association of tinnitus with sensorineural hearing loss as well as the central generation of tinnitus.  Amplification of the left ear hearing loss may afford some improvement in tinnitus and we also discussed masking strategies and consideration of CBT if things were to worsen or become more bothersome.    Julisa Michel MD  Otology & Neurotology  [facility]

## 2020-11-13 NOTE — NURSING NOTE
"Chief Complaint   Patient presents with     Consult     left asymmetric sensorineural hearing loss      Blood pressure 123/76, pulse 74, temperature 97.3  F (36.3  C), resp. rate 16, height 1.727 m (5' 8\"), weight 69.9 kg (154 lb), SpO2 98 %, not currently breastfeeding.    Gennaro Del Castillo LPN    "

## 2020-11-13 NOTE — LETTER
"11/13/2020       RE: Kaylyn Fischer  [address] [place] [address]     Dear Colleague,    Thank you for referring your patient, Kaylyn Fischer, to the [facility] [place] [facility]. Please see a copy of my visit note below.    November 13, 2020    I had the pleasure of meeting Kaylyn Fischer today in consultation for left asymmetric sensorineural hearing loss with accompanying tinnitus.    She is a 55-year-old woman who began noting that she was having more difficulty hearing out of her left ear once we started using masks during the Covid pandemic.  She has been aware of high-pitched ringing tinnitus in her left ear for many years and also for many years has had some hearing loss that she has been aware of.  At times the tinnitus has become bothersome but oftentimes she is able to manage it.  She finds that the hearing loss and the tinnitus interfere with certain sounds, specifically \"th\" and \"sh\".  She has difficulty hearing in noisy restaurants.  While she is aware of the left tinnitus being lateralizing she does not notice a difference in hearing between her 2 ears.  This all came on gradually and there been no sudden hearing losses.  The tinnitus is not keeping her from living her daily life nor is it associated with significant distress or depression.    She has not had any recent dizziness.  She had one spell of spinning after head turning and was told was related to some crystals but it has been many years since that was an occurrence. Denies pain, drainage, or a history of ear surgeries.      Past Medical History:   Diagnosis Date     Adhesive capsulitis of right shoulder 4/2016 & 9/2016    cortisone injection & manipulation with anesthesia      Genital herpes     Valtrex     Genital warts     Aldara     PMDD (premenstrual dysphoric disorder) 2010    Lexapro       Past Surgical History:   Procedure Laterality " "Date     COLONOSCOPY N/A 2016     COLONOSCOPY WITH CO2 INSUFFLATION N/A 2016    NL, repeat 10 years      ZZC NONSPECIFIC PROCEDURE  2016    manipulation left shoulder        Current Outpatient Medications   Medication     calcium carbonate-vitamin D 500-400 MG-UNIT TABS tablt     escitalopram (LEXAPRO) 20 MG tablet     estradiol (CLIMARA) 0.05 MG/24HR weekly patch     valACYclovir (VALTREX) 500 MG tablet     No current facility-administered medications for this visit.        Family History   Problem Relation Age of Onset     Heart Disease Sister         PVC, tx'd beta blocker       Social History     Tobacco Use     Smoking status: Former Smoker     Packs/day: 0.50     Years: 20.00     Pack years: 10.00     Types: Cigarettes     Quit date: 2003     Years since quittin.8     Smokeless tobacco: Never Used   Substance Use Topics     Alcohol use: Yes     Alcohol/week: 0.0 standard drinks     Comment: 7 drinks week     Drug use: No         Patient Supplied Answers to Review of Systems  [facility] ROS 2020   Ears, Nose, Throat Ringing/noise in ears   Complete review of systems otherwise negative    Physical examination:  /76   Pulse 74   Temp 97.3  F (36.3  C)   Resp 16   Ht 1.727 m (5' 8\")   Wt 69.9 kg (154 lb)   SpO2 98%   BMI 23.42 kg/m    General: She appeared well, was unaccompanied, in no acute distress  Respiratory: Breathing without stridor or stertor  Neurologic: Patient to function normal bilaterally.  Alert and oriented x3.  Psychiatric: Affect is calm cooperative and appropriate  Skin: Warm pink and without lesions  Ears: External auditory canals lined with healthy skin with normal tympanic membranes and aerated middle ear spaces bilaterally.  512 Hz tuning fork lateralized to the right ear with air conduction louder than bone conduction bilaterally.    Audiogram:  Results: Right: Normal hearing.  Left: Normal hearing sloping to moderate SNHL. Asymmetry noted at 8298-8627 " Hz. Negative Carey.  100% word recognition bilaterally  Tympanograms WNL right and shallow left.   Reflexes present at normal levels in all conditions.      Impression and plan:  1. Asymmetric left sensorineural hearing loss, moderate, with more than 20dB asymmetry at multiple frequencies.  No clear inciting event.    - MRI IAC protocol to seek retrocochlear pathology  - Hearing aid consult after MRI  2. Left tinnitus, secondary to #1.  - reviewed association of tinnitus with sensorineural hearing loss as well as the central generation of tinnitus.  Amplification of the left ear hearing loss may afford some improvement in tinnitus and we also discussed masking strategies and consideration of CBT if things were to worsen or become more bothersome.    Julisa Michel MD  Otology & Neurotology  [facility]

## 2020-11-22 ENCOUNTER — HEALTH MAINTENANCE LETTER (OUTPATIENT)
Age: 55
End: 2020-11-22

## 2020-12-09 ENCOUNTER — ANCILLARY PROCEDURE (OUTPATIENT)
Dept: MRI IMAGING | Facility: CLINIC | Age: 55
End: 2020-12-09
Attending: OTOLARYNGOLOGY
Payer: COMMERCIAL

## 2020-12-09 DIAGNOSIS — H90.3 ASYMMETRICAL SENSORINEURAL HEARING LOSS: ICD-10-CM

## 2020-12-09 PROCEDURE — 70553 MRI BRAIN STEM W/O & W/DYE: CPT | Performed by: RADIOLOGY

## 2020-12-09 PROCEDURE — A9585 GADOBUTROL INJECTION: HCPCS | Performed by: RADIOLOGY

## 2020-12-09 RX ORDER — GADOBUTROL 604.72 MG/ML
7.5 INJECTION INTRAVENOUS ONCE
Status: COMPLETED | OUTPATIENT
Start: 2020-12-09 | End: 2020-12-09

## 2020-12-09 RX ADMIN — GADOBUTROL 7 ML: 604.72 INJECTION INTRAVENOUS at 17:20

## 2020-12-09 NOTE — DISCHARGE INSTRUCTIONS
MRI Contrast Discharge Instructions    The IV contrast you received today will pass out of your body in your  urine. This will happen in the next 24 hours. You will not feel this process.  Your urine will not change color.    Drink at least 4 extra glasses of water or juice today (unless your doctor  has restricted your fluids). This reduces the stress on your kidneys.  You may take your regular medicines.    If you are on dialysis: It is best to have dialysis today.    If you have a reaction: Most reactions happen right away. If you have  any new symptoms after leaving the hospital (such as hives or swelling),  call your hospital at the correct number below. Or call your family doctor.  If you have breathing distress or wheezing, call 911.    Special instructions: ***    I have read and understand the above information.    Signature:______________________________________ Date:___________    Staff:__________________________________________ Date:___________     Time:__________    [facility] Departments:    ___[place]: [phone]  ___[facility]: [phone]  ___[place]: [phone] ___[facility]: [phone]  ___[facility]: [phone]  ___[facility]: [phone]  ___[phone]  ___[facility]: [phone]  ___[phone]

## 2020-12-10 ENCOUNTER — TELEPHONE (OUTPATIENT)
Dept: OTOLARYNGOLOGY | Facility: CLINIC | Age: 55
End: 2020-12-10

## 2020-12-10 NOTE — TELEPHONE ENCOUNTER
----- Message from Julisa Michel MD sent at 12/10/2020  2:23 PM CST -----  Please call with results. Reassuringly, the MRI shows no lesion that would cause asymmetric sensorineural hearing loss.    Julisa Michel MD    Pt was notified and verbally understood.    Liss Duval LPN

## 2020-12-13 PROBLEM — J44.9 COPD (CHRONIC OBSTRUCTIVE PULMONARY DISEASE) (H): Status: ACTIVE | Noted: 2020-12-13

## 2021-01-21 ENCOUNTER — OFFICE VISIT (OUTPATIENT)
Dept: AUDIOLOGY | Facility: CLINIC | Age: 56
End: 2021-01-21
Attending: OTOLARYNGOLOGY
Payer: COMMERCIAL

## 2021-01-21 DIAGNOSIS — H90.42 SENSORINEURAL HEARING LOSS (SNHL) OF LEFT EAR WITH UNRESTRICTED HEARING OF RIGHT EAR: Primary | ICD-10-CM

## 2021-01-21 DIAGNOSIS — H90.3 ASYMMETRICAL SENSORINEURAL HEARING LOSS: ICD-10-CM

## 2021-01-21 PROCEDURE — 92590 PR HEARING AID EXAM MONAURAL: CPT | Mod: LT | Performed by: AUDIOLOGIST

## 2021-01-21 NOTE — PROGRESS NOTES
AUDIOLOGY REPORT    SUBJECTIVE: Kaylyn Fischer is a 55 year old female who was seen in the Audiology Clinic at [facility] on 1/21/2021 to discuss concerns with hearing and functional communication difficulties. The patient has been seen previously on 10/16/2020, and results revealed normal hearing for the right ear and normal hearing sloping to moderate sensorineural hearing loss for the left ear. She was medically evaluated and determined to be cleared for a left hearing aid by Julisa Michel M.D. Kaylyn notes difficulty with communication in a variety of listening situations as well as left tinnitus, which is bothersome.    OBJECTIVE: The patient is a hearing aid candidate. The patient would like to move forward with a hearing aid evaluation today. Therefore, the patient was presented with different options for amplification to help aid in communication. Discussed styles, levels of technology, tinnitus maskers, phone compatibility/apps, and Access vs. Full Treatment Hearing plans.     The hearing aid mutually chosen was:  LEFT: Unitron Au2 550 R Li  COLOR: Pewter  BATTERY SIZE: Rechargeable  EARMOLD/TIPS: Medium open domes  CANAL/ LENGTH: 1S    ASSESSMENT: Reviewed purchase information and warranty information with the patient. The 45 day trial period was explained to the patient. The patient was given a copy of the [place] Department of [employer] consumer brochure on purchasing hearing instruments. Patient risk factors have been provided to the patient in writing prior to the sale of the hearing aid per FDA regulation. The risk factors are also available in the User Instructional Booklet to be presented on the day of the hearing aid fitting. Hearing aid ordered. Hearing aid evaluation completed.    PLAN: Kaylyn is scheduled to return in 2-3 weeks for a hearing aid fitting and programming. Purchase agreement will be completed on that date. Please contact this  clinic with any questions or concerns.      Drake Kaplan, [facility]  Licensed Audiologist  [place] #[id number]

## 2021-02-04 ENCOUNTER — ALLIED HEALTH/NURSE VISIT (OUTPATIENT)
Dept: NURSING | Facility: CLINIC | Age: 56
End: 2021-02-04
Payer: COMMERCIAL

## 2021-02-04 DIAGNOSIS — Z23 NEED FOR VACCINATION: Primary | ICD-10-CM

## 2021-02-04 PROCEDURE — 90750 HZV VACC RECOMBINANT IM: CPT

## 2021-02-04 PROCEDURE — 99207 PR NO CHARGE NURSE ONLY: CPT

## 2021-02-04 PROCEDURE — 90471 IMMUNIZATION ADMIN: CPT

## 2021-02-10 ENCOUNTER — TRANSFERRED RECORDS (OUTPATIENT)
Dept: HEALTH INFORMATION MANAGEMENT | Facility: CLINIC | Age: 56
End: 2021-02-10

## 2021-02-11 ENCOUNTER — OFFICE VISIT (OUTPATIENT)
Dept: AUDIOLOGY | Facility: CLINIC | Age: 56
End: 2021-02-11
Payer: COMMERCIAL

## 2021-02-11 DIAGNOSIS — H90.42 SENSORINEURAL HEARING LOSS (SNHL) OF LEFT EAR WITH UNRESTRICTED HEARING OF RIGHT EAR: Primary | ICD-10-CM

## 2021-02-11 PROCEDURE — V5011 HEARING AID FITTING/CHECKING: HCPCS | Performed by: AUDIOLOGIST

## 2021-02-11 PROCEDURE — V5257 HEARING AID, DIGIT, MON, BTE: HCPCS | Performed by: AUDIOLOGIST

## 2021-02-11 PROCEDURE — V5020 CONFORMITY EVALUATION: HCPCS | Performed by: AUDIOLOGIST

## 2021-02-11 PROCEDURE — V5241 DISPENSING FEE, MONAURAL: HCPCS | Performed by: AUDIOLOGIST

## 2021-02-11 NOTE — PROGRESS NOTES
AUDIOLOGY REPORT    SUBJECTIVE: Kaylyn Fischer is a 55 year old female who was seen in the Audiology Clinic at [facility] for fitting of a left Unitron AU2 550 R Li hearing aid. Previous results have revealed normal hearing for the right ear and normal hearing sloping to moderate sensorineural hearing loss for the left ear. The patient was given medical clearance to pursue amplification by Julisa Michel M.D.    OBJECTIVE: The hearing aid conformity evaluation was completed.The hearing aid was placed and provided a good fit. Simulated real-ear measurements were completed on the Doctor[employer] system and were a good match to NAL-NL1 targets with soft sounds audible, moderate sounds comfortable, and loud sounds below discomfort. UCLs are verified through maximum power output measures and demonstrate appropriate limiting of loud inputs. Kaylyn was oriented to proper hearing aid use, care, cleaning (no water, dry brush), batteries (rechargeable, low-battery signal), aid insertion/removal, user booklet, warranty information, storage cases, and other hearing aid details. The patient confirmed understanding of hearing aid use and care and showed proper insertion of the hearing aid while in the office today. Kaylyn reported good volume and sound quality.   Hearing aids were programmed as follows:  Program 1: Universal  Program button and volume control were deactivated today. Kaylyn would like to wait to pair the hearing aid with her iPhone and the [employer] harriet.    EAR FIT: Left  HEARING AID MODEL NAME: Unitron AU2 550 R Li  HEARING AID STYLE: -in-the-ear behind-the-ear  EARMOLDS/TIP: Medium open dome  SERIAL NUMBER: [id number]  WARRANTY END DATE: 2/19/2024    ASSESSMENT: A left Unitron AU2 550 R Li hearing aid was fit today. Verification measures were performed. Kaylyn signed the Hearing Aid Purchase Agreement and was given a copy, as well as details on her hearing aids. The  patient was counseled that exact out of pocket amounts cannot be determined for hearing aid claims being sent to insurance. Any insurance coverage information presented to the patient is an estimate only, and is not a guarantee of payment. The patient has been advised to check with their own insurance.    PLAN: Kaylyn will return for follow-up in 2-3 weeks for a hearing aid review appointment. The hearing aid will be paired to her iPhone and the [employer] harriet at that time. Please call this clinic with questions regarding today s appointment.    Drake Kaplan, [facility]  Licensed Audiologist  [place] #[id number]

## 2021-03-03 ENCOUNTER — OFFICE VISIT (OUTPATIENT)
Dept: AUDIOLOGY | Facility: CLINIC | Age: 56
End: 2021-03-03
Payer: COMMERCIAL

## 2021-03-03 DIAGNOSIS — H90.42 SENSORINEURAL HEARING LOSS (SNHL) OF LEFT EAR WITH UNRESTRICTED HEARING OF RIGHT EAR: Primary | ICD-10-CM

## 2021-03-03 PROCEDURE — 99207 PR ASSESSMENT FOR HEARING AID: CPT | Performed by: AUDIOLOGIST

## 2021-03-03 NOTE — PROGRESS NOTES
AUDIOLOGY REPORT    SUBJECTIVE: Kaylyn Fischer is a 55 year old female who was seen in the Audiology Clinic at [facility] on 3/3/2021 for a follow-up check regarding the fitting of a new hearing aid. Previous results have revealed normal hearing for the right ear and normal hearing sloping to moderate sensorineural hearing loss for the left ear. The patient has been seen previously in this clinic and was fit with a left Unitron AU2 550 R Li hearing aid on 2/11/2021.  Kaylyn reports she is overall pleased with the new hearing aid. She does note the volume seems slightly loud, especially for high frequency sounds. She also reports a low level static noise. Kaylyn notes that she was able to successfully connect the hearing aid with her iPhone.     OBJECTIVE: Based on patient report, the following changes were made: Overall gain was decreased by 3 dB. High frequency gain was decreased by an additional 2 dB. Following these changes, Kaylyn reported good volume and sound quality. She noted the low level static noise was eliminated. An electroacoustic analysis was performed at User Settings for future comparison.     Reviewed 45 day trial period, care, cleaning (no water, dry brush), batteries (rechargeable, low-battery signal), aid insertion/removal, volume adjustment (if applicable), user booklet, warranty information, storage cases, and other hearing aid details.     ASSESSMENT: A follow-up appointment for hearing aid fitting was completed today. Changes made as outlined above. No charge, as this visit is included as part of the Access plan. The patient has one more no charge visit remaining.      PLAN: Kaylyn will return for follow-up as needed. Please call this clinic with any questions regarding today s appointment.    Drake Kaplan, [facility]  Licensed Audiologist  [place] #[id number]

## 2021-03-19 DIAGNOSIS — Z12.31 VISIT FOR SCREENING MAMMOGRAM: ICD-10-CM

## 2021-03-19 PROCEDURE — 77067 SCR MAMMO BI INCL CAD: CPT | Mod: TC | Performed by: RADIOLOGY

## 2021-04-06 ENCOUNTER — IMMUNIZATION (OUTPATIENT)
Dept: FAMILY MEDICINE | Facility: OTHER | Age: 56
End: 2021-04-06
Attending: NURSE PRACTITIONER
Payer: COMMERCIAL

## 2021-04-06 PROCEDURE — 0031A PR COVID VAC JANSSEN AD26 0.5ML: CPT

## 2021-04-06 PROCEDURE — 91303 PR COVID VAC JANSSEN AD26 0.5ML: CPT

## 2021-08-11 ENCOUNTER — OFFICE VISIT (OUTPATIENT)
Dept: URGENT CARE | Facility: URGENT CARE | Age: 56
End: 2021-08-11
Payer: COMMERCIAL

## 2021-08-11 VITALS
SYSTOLIC BLOOD PRESSURE: 134 MMHG | HEART RATE: 100 BPM | DIASTOLIC BLOOD PRESSURE: 80 MMHG | TEMPERATURE: 98.2 F | OXYGEN SATURATION: 99 %

## 2021-08-11 DIAGNOSIS — R11.0 NAUSEA: Primary | ICD-10-CM

## 2021-08-11 DIAGNOSIS — R10.84 ABDOMINAL PAIN, GENERALIZED: ICD-10-CM

## 2021-08-11 LAB
ALBUMIN SERPL-MCNC: 3.8 G/DL (ref 3.4–5)
ALP SERPL-CCNC: 95 U/L (ref 40–150)
ALT SERPL W P-5'-P-CCNC: 27 U/L (ref 0–50)
ANION GAP SERPL CALCULATED.3IONS-SCNC: 3 MMOL/L (ref 3–14)
AST SERPL W P-5'-P-CCNC: 31 U/L (ref 0–45)
BASOPHILS # BLD AUTO: 0 10E3/UL (ref 0–0.2)
BASOPHILS NFR BLD AUTO: 1 %
BILIRUB SERPL-MCNC: 0.7 MG/DL (ref 0.2–1.3)
BUN SERPL-MCNC: 18 MG/DL (ref 7–30)
CALCIUM SERPL-MCNC: 10.3 MG/DL (ref 8.5–10.1)
CHLORIDE BLD-SCNC: 104 MMOL/L (ref 94–109)
CO2 SERPL-SCNC: 31 MMOL/L (ref 20–32)
CREAT SERPL-MCNC: 0.8 MG/DL (ref 0.52–1.04)
EOSINOPHIL # BLD AUTO: 0.1 10E3/UL (ref 0–0.7)
EOSINOPHIL NFR BLD AUTO: 1 %
ERYTHROCYTE [DISTWIDTH] IN BLOOD BY AUTOMATED COUNT: 11.9 % (ref 10–15)
GFR SERPL CREATININE-BSD FRML MDRD: 83 ML/MIN/1.73M2
GLUCOSE BLD-MCNC: 111 MG/DL (ref 70–99)
HCT VFR BLD AUTO: 39.7 % (ref 35–47)
HGB BLD-MCNC: 13.3 G/DL (ref 11.7–15.7)
LIPASE SERPL-CCNC: 314 U/L (ref 73–393)
LYMPHOCYTES # BLD AUTO: 1.1 10E3/UL (ref 0.8–5.3)
LYMPHOCYTES NFR BLD AUTO: 18 %
MCH RBC QN AUTO: 30.4 PG (ref 26.5–33)
MCHC RBC AUTO-ENTMCNC: 33.5 G/DL (ref 31.5–36.5)
MCV RBC AUTO: 91 FL (ref 78–100)
MONOCYTES # BLD AUTO: 0.7 10E3/UL (ref 0–1.3)
MONOCYTES NFR BLD AUTO: 13 %
NEUTROPHILS # BLD AUTO: 3.9 10E3/UL (ref 1.6–8.3)
NEUTROPHILS NFR BLD AUTO: 68 %
PLATELET # BLD AUTO: 250 10E3/UL (ref 150–450)
POTASSIUM BLD-SCNC: 4.6 MMOL/L (ref 3.4–5.3)
PROT SERPL-MCNC: 7.5 G/DL (ref 6.8–8.8)
RBC # BLD AUTO: 4.38 10E6/UL (ref 3.8–5.2)
SODIUM SERPL-SCNC: 138 MMOL/L (ref 133–144)
WBC # BLD AUTO: 5.8 10E3/UL (ref 4–11)

## 2021-08-11 PROCEDURE — 36415 COLL VENOUS BLD VENIPUNCTURE: CPT | Performed by: FAMILY MEDICINE

## 2021-08-11 PROCEDURE — 82150 ASSAY OF AMYLASE: CPT | Performed by: FAMILY MEDICINE

## 2021-08-11 PROCEDURE — 99214 OFFICE O/P EST MOD 30 MIN: CPT | Performed by: FAMILY MEDICINE

## 2021-08-11 PROCEDURE — 83690 ASSAY OF LIPASE: CPT | Performed by: FAMILY MEDICINE

## 2021-08-11 PROCEDURE — 85025 COMPLETE CBC W/AUTO DIFF WBC: CPT | Performed by: FAMILY MEDICINE

## 2021-08-11 PROCEDURE — 80053 COMPREHEN METABOLIC PANEL: CPT | Performed by: FAMILY MEDICINE

## 2021-08-11 RX ORDER — ONDANSETRON 4 MG/1
4 TABLET, ORALLY DISINTEGRATING ORAL ONCE
Status: COMPLETED | OUTPATIENT
Start: 2021-08-11 | End: 2021-08-11

## 2021-08-11 RX ORDER — ONDANSETRON 4 MG/1
4 TABLET, ORALLY DISINTEGRATING ORAL EVERY 8 HOURS PRN
Qty: 12 TABLET | Refills: 0 | Status: SHIPPED | OUTPATIENT
Start: 2021-08-11 | End: 2021-08-16

## 2021-08-11 RX ADMIN — ONDANSETRON 4 MG: 4 TABLET, ORALLY DISINTEGRATING ORAL at 10:36

## 2021-08-11 NOTE — PROGRESS NOTES
SUBJECTIVE  HPI:  Kaylyn Fischer is a 55 year old female who presents with the CC of nausea and abdominal pain.    Nausea for 1 week, has been more fatigued, body aches, abdominal pain.  Stools color - more latasha colored but is now improving.  No fever.  Had diarrhea but lasted only 1 day, this resolved about 4 days ago.  Abdominal pain more upper aspect, epigastric and right sided.  No history of GERD, still has gallbladder and appendix.     Was traveling in [place], got back 8/3.  Thinks that may have eaten raw broccoli and concerned that may have contracted hepatitis infection.    COVID screen negative   Completed J[employer]J COVID vaccination in April      Past Medical History:   Diagnosis Date     Adhesive capsulitis of right shoulder 2016 & 2016    cortisone injection & manipulation with anesthesia      Genital herpes     Valtrex     Genital warts     Aldara     PMDD (premenstrual dysphoric disorder)     Lexapro     Current Outpatient Medications   Medication Sig Dispense Refill     calcium carbonate-vitamin D 500-400 MG-UNIT TABS tablt Take 1 tablet by mouth daily 500 mg 180 tablet 3     escitalopram (LEXAPRO) 20 MG tablet Take 1 tablet (20 mg) by mouth daily 90 tablet 3     estradiol (CLIMARA) 0.05 MG/24HR weekly patch APPLY 1 PATCH TOPICALLY TO  SKIN ONCE A WEEK 12 patch 11     ondansetron (ZOFRAN ODT) 4 MG ODT tab Take 1 tablet (4 mg) by mouth every 8 hours as needed for nausea or vomiting 12 tablet 0     valACYclovir (VALTREX) 500 MG tablet TAKE 1 TABLET BY MOUTH 2  TIMES DAILY FOR 3 DAY PER  OUTBREAK 30 tablet 6     Social History     Tobacco Use     Smoking status: Former Smoker     Packs/day: 0.50     Years: 20.00     Pack years: 10.00     Types: Cigarettes     Quit date: 2003     Years since quittin.5     Smokeless tobacco: Never Used   Substance Use Topics     Alcohol use: Yes     Alcohol/week: 0.0 standard drinks     Comment: 7 drinks week       ROS:  Review of systems negative except as  stated above.    OBJECTIVE:  /80   Pulse 100   Temp 98.2  F (36.8  C) (Tympanic)   SpO2 99%   GENERAL APPEARANCE: healthy, alert and no distress  EYES: EOMI,  PERRL, conjunctiva clear  RESP: lungs with no audbible wheezes or increase work of breathing  PSYCH: mentation appears normal and affect normal/bright    Results for orders placed or performed in visit on 08/11/21   CBC with platelets and differential     Status: None    Narrative    The following orders were created for panel order CBC with platelets and differential.  Procedure                               Abnormality         Status                     ---------                               -----------         ------                     CBC with platelets and d...[[id number]]                      Final result                 Please view results for these tests on the individual orders.   Comprehensive metabolic panel (BMP + Alb, Alk Phos, ALT, AST, Total. Bili, TP)     Status: Abnormal   Result Value Ref Range    Sodium 138 133 - 144 mmol/L    Potassium 4.6 3.4 - 5.3 mmol/L    Chloride 104 94 - 109 mmol/L    Carbon Dioxide (CO2) 31 20 - 32 mmol/L    Anion Gap 3 3 - 14 mmol/L    Urea Nitrogen 18 7 - 30 mg/dL    Creatinine 0.80 0.52 - 1.04 mg/dL    Calcium 10.3 (H) 8.5 - 10.1 mg/dL    Glucose 111 (H) 70 - 99 mg/dL    Alkaline Phosphatase 95 40 - 150 U/L    AST 31 0 - 45 U/L    ALT 27 0 - 50 U/L    Protein Total 7.5 6.8 - 8.8 g/dL    Albumin 3.8 3.4 - 5.0 g/dL    Bilirubin Total 0.7 0.2 - 1.3 mg/dL    GFR Estimate 83 >60 mL/min/1.73m2   CBC with platelets and differential     Status: None   Result Value Ref Range    WBC Count 5.8 4.0 - 11.0 10e3/uL    RBC Count 4.38 3.80 - 5.20 10e6/uL    Hemoglobin 13.3 11.7 - 15.7 g/dL    Hematocrit 39.7 35.0 - 47.0 %    MCV 91 78 - 100 fL    MCH 30.4 26.5 - 33.0 pg    MCHC 33.5 31.5 - 36.5 g/dL    RDW 11.9 10.0 - 15.0 %    Platelet Count 250 150 - 450 10e3/uL    % Neutrophils 68 %    % Lymphocytes 18 %    %  Monocytes 13 %    % Eosinophils 1 %    % Basophils 1 %    Absolute Neutrophils 3.9 1.6 - 8.3 10e3/uL    Absolute Lymphocytes 1.1 0.8 - 5.3 10e3/uL    Absolute Monocytes 0.7 0.0 - 1.3 10e3/uL    Absolute Eosinophils 0.1 0.0 - 0.7 10e3/uL    Absolute Basophils 0.0 0.0 - 0.2 10e3/uL       ASSESSMENT/PLAN:  (R11.0) Nausea  (primary encounter diagnosis)  Plan: CBC with platelets and differential,         Comprehensive metabolic panel (BMP + Alb, Alk         Phos, ALT, AST, Total. Bili, TP), Lipase,         Amylase, ondansetron (ZOFRAN-ODT) ODT tab 4 mg,        ondansetron (ZOFRAN ODT) 4 MG ODT tab            (R10.84) Abdominal pain, generalized  Plan: CBC with platelets and differential,         Comprehensive metabolic panel (BMP + Alb, Alk         Phos, ALT, AST, Total. Bili, TP), Lipase,         Amylase              Zofran 4 mg ODT given in clinic with improvement of symptoms.  Reviewed that may be viral etiology vs gallbladder vs GERD.  RX zofran given, reviewed importance of hydration.  As patient appears well, non-toxic appearing, encourage follow up with primary provider for further evaluation for gallbladder with ultrasound imaging.  Will follow up on pending labs and notify if any abnormalities.  To ER if any acute worsening of abdominal symptoms.    Follow up with primary provider in 1 week    Diony Clark MD, MD  August 11, 2021 11:49 AM

## 2021-08-12 LAB — AMYLASE SERPL-CCNC: 83 U/L (ref 30–110)

## 2021-08-13 ENCOUNTER — OFFICE VISIT (OUTPATIENT)
Dept: FAMILY MEDICINE | Facility: CLINIC | Age: 56
End: 2021-08-13
Payer: COMMERCIAL

## 2021-08-13 VITALS
HEIGHT: 68 IN | OXYGEN SATURATION: 96 % | DIASTOLIC BLOOD PRESSURE: 84 MMHG | RESPIRATION RATE: 14 BRPM | TEMPERATURE: 97.7 F | BODY MASS INDEX: 21.95 KG/M2 | SYSTOLIC BLOOD PRESSURE: 120 MMHG | WEIGHT: 144.8 LBS | HEART RATE: 110 BPM

## 2021-08-13 DIAGNOSIS — Z53.9 ERRONEOUS ENCOUNTER--DISREGARD: Primary | ICD-10-CM

## 2021-08-13 ASSESSMENT — MIFFLIN-ST. JEOR: SCORE: 1300.18

## 2021-08-16 ENCOUNTER — OFFICE VISIT (OUTPATIENT)
Dept: FAMILY MEDICINE | Facility: CLINIC | Age: 56
End: 2021-08-16
Payer: COMMERCIAL

## 2021-08-16 VITALS
DIASTOLIC BLOOD PRESSURE: 70 MMHG | BODY MASS INDEX: 21.55 KG/M2 | HEART RATE: 94 BPM | RESPIRATION RATE: 15 BRPM | TEMPERATURE: 97.3 F | SYSTOLIC BLOOD PRESSURE: 109 MMHG | HEIGHT: 69 IN | OXYGEN SATURATION: 98 % | WEIGHT: 145.5 LBS

## 2021-08-16 DIAGNOSIS — E05.00 GRAVES DISEASE: ICD-10-CM

## 2021-08-16 DIAGNOSIS — K92.1 BLACK STOOL: ICD-10-CM

## 2021-08-16 DIAGNOSIS — R79.89 ELEVATED SERUM FREE T4 LEVEL: Primary | ICD-10-CM

## 2021-08-16 LAB
T3 SERPL-MCNC: 336 NG/DL (ref 60–181)
T4 FREE SERPL-MCNC: 4.03 NG/DL (ref 0.76–1.46)
TSH SERPL DL<=0.005 MIU/L-ACNC: <0.01 MU/L (ref 0.4–4)

## 2021-08-16 PROCEDURE — 84439 ASSAY OF FREE THYROXINE: CPT | Performed by: FAMILY MEDICINE

## 2021-08-16 PROCEDURE — 84443 ASSAY THYROID STIM HORMONE: CPT | Performed by: FAMILY MEDICINE

## 2021-08-16 PROCEDURE — 84480 ASSAY TRIIODOTHYRONINE (T3): CPT | Performed by: FAMILY MEDICINE

## 2021-08-16 PROCEDURE — 86376 MICROSOMAL ANTIBODY EACH: CPT | Performed by: FAMILY MEDICINE

## 2021-08-16 PROCEDURE — 86800 THYROGLOBULIN ANTIBODY: CPT | Performed by: FAMILY MEDICINE

## 2021-08-16 RX ORDER — PROCHLORPERAZINE MALEATE 10 MG
10 TABLET ORAL
COMMUNITY
Start: 2021-08-13 | End: 2021-08-24

## 2021-08-16 RX ORDER — SUCRALFATE ORAL 1 G/10ML
1 SUSPENSION ORAL
COMMUNITY
Start: 2021-08-13 | End: 2021-08-24

## 2021-08-16 ASSESSMENT — MIFFLIN-ST. JEOR: SCORE: 1311.48

## 2021-08-16 NOTE — NURSING NOTE
"55 year old  Chief Complaint   Patient presents with     LAB REQUEST     recheck thyroid      Referral     endoscopy       Blood pressure 109/70, pulse 94, temperature 97.3  F (36.3  C), temperature source Skin, resp. rate 15, height 1.74 m (5' 8.5\"), weight 66 kg (145 lb 8 oz), last menstrual period 2016, SpO2 98 %, not currently breastfeeding. Body mass index is 21.8 kg/m .  Patient Active Problem List   Diagnosis     CARDIOVASCULAR SCREENING; LDL GOAL LESS THAN 160     PMDD (premenstrual dysphoric disorder)     Herpes simplex vulvovaginitis     COPD (chronic obstructive pulmonary disease) (H)       Wt Readings from Last 2 Encounters:   21 66 kg (145 lb 8 oz)   21 65.7 kg (144 lb 12.8 oz)     BP Readings from Last 3 Encounters:   21 109/70   21 120/84   21 134/80         Current Outpatient Medications   Medication     calcium carbonate-vitamin D 500-400 MG-UNIT TABS tablt     escitalopram (LEXAPRO) 20 MG tablet     estradiol (CLIMARA) 0.05 MG/24HR weekly patch     omeprazole (PRILOSEC) 20 MG DR capsule     prochlorperazine (COMPAZINE) 10 MG tablet     sucralfate (CARAFATE) 1 GM/10ML suspension     valACYclovir (VALTREX) 500 MG tablet     ondansetron (ZOFRAN ODT) 4 MG ODT tab     No current facility-administered medications for this visit.       Social History     Tobacco Use     Smoking status: Former Smoker     Packs/day: 0.50     Years: 20.00     Pack years: 10.00     Types: Cigarettes     Quit date: 2003     Years since quittin.5     Smokeless tobacco: Never Used   Substance Use Topics     Alcohol use: Yes     Alcohol/week: 0.0 standard drinks     Comment: 7 drinks week     Drug use: No       Health Maintenance Due   Topic Date Due     PREVENTIVE CARE VISIT  Never done     SPIROMETRY  Never done     ADVANCE CARE PLANNING  Never done     COPD ACTION PLAN  Never done     LIPID  Never done     HPV TEST  2021     PAP  2021       Lab Results   Component Value " Date    PAP NIL 04/20/2018 August 16, 2021 4:27 PM

## 2021-08-16 NOTE — PROGRESS NOTES
SUBJECTIVE:   Kaylyn Fischer is a 55 year old female who presents to clinic today to discuss the following problem(s).    (R79.89) Elevated serum free T4 level  (primary encounter diagnosis)  Comment:   - patient seen last week for persistent nausea and vomiting  - sent to the ED at [facility] for concern for dehydration and inability to take in PO fluids for a week  - patient presents today feeling much improved, but...  - labs at [facility] were notable for the following labs     - TSH:  <0.010     - Free T4 7.5     - Free T3 28.65  - patient reports she does not take any sort of biotin supplement which could skew these lab results  - patient was advised to follow up with her PCP for further workup     (K92.1) Black stool  - during most recent episode of chronic nausea and vomiting, patient also noted having black stool  - patient notes that ED was concerned for possible peptic ulcer based on her exam and history   - fecal occult in the ED was negative, but ED MD recommended follow up with upper endoscopy to assess for possible gastric bleed  - as noted above, patient reports she is feeling much improved from last visit and visit to the ED       ROS: 10 point ROS neg other than the symptoms noted above in the HPI.      Today's PHQ-2:  PHQ-2 ( 1999 Pfizer) 8/16/2021 11/13/2020   Q1: Little interest or pleasure in doing things 0 0   Q2: Feeling down, depressed or hopeless 0 0   PHQ-2 Score 0 0       Past Medical History:   Diagnosis Date     Adhesive capsulitis of right shoulder 4/2016 & 9/2016    cortisone injection & manipulation with anesthesia      Genital herpes     Valtrex     Genital warts     Aldara     PMDD (premenstrual dysphoric disorder) 2010    Lexapro     Past Surgical History:   Procedure Laterality Date     COLONOSCOPY N/A 1/29/2016     COLONOSCOPY WITH CO2 INSUFFLATION N/A 1/29/2016    NL, repeat 10 years      ZZC NONSPECIFIC PROCEDURE  9/2016    manipulation left shoulder      Family History  "  Problem Relation Age of Onset     Heart Disease Sister         PVC, tx'd beta blocker     Social History     Tobacco Use     Smoking status: Former Smoker     Packs/day: 0.50     Years: 20.00     Pack years: 10.00     Types: Cigarettes     Quit date: 2003     Years since quittin.5     Smokeless tobacco: Never Used   Substance Use Topics     Alcohol use: Yes     Alcohol/week: 0.0 standard drinks     Comment: 7 drinks week     Drug use: No     Social History     Social History Narrative     Not on file       Current Outpatient Medications   Medication     calcium carbonate-vitamin D 500-400 MG-UNIT TABS tablt     escitalopram (LEXAPRO) 20 MG tablet     estradiol (CLIMARA) 0.05 MG/24HR weekly patch     omeprazole (PRILOSEC) 20 MG DR capsule     prochlorperazine (COMPAZINE) 10 MG tablet     sucralfate (CARAFATE) 1 GM/10ML suspension     valACYclovir (VALTREX) 500 MG tablet     ondansetron (ZOFRAN ODT) 4 MG ODT tab     No current facility-administered medications for this visit.     I have reviewed the patient's past medical, surgical, family, and social history.     OBJECTIVE:   /70 (BP Location: Left arm, Patient Position: Sitting, Cuff Size: Adult Regular)   Pulse 94   Temp 97.3  F (36.3  C) (Skin)   Resp 15   Ht 1.74 m (5' 8.5\")   Wt 66 kg (145 lb 8 oz)   LMP 2016 (Exact Date)   SpO2 98%   BMI 21.80 kg/m      Constitutional: well-appearing, appears stated age  Eyes: conjunctivae without erythema, sclera anicteric.   Cardiac: regular rate and rhythm, normal S1/S2, no murmur/rubs/gallops  Respiratory: lungs clear to auscultation bilaterally, normal work of breathing, no wheezes/crackles  Abdomen: soft, non-tender, non-distended, BS+, no palpable masses or HSM  Skin: no rashes, lesions, or wounds  Psych: affect is full and appropriate, speech is fluent and non-pressured    ASSESSMENT AND PLAN:     Kaylyn was seen today for lab request and referral.    Diagnoses and all orders for this " visit:    Elevated serum free T4 level  -     TSH; Future  -     T4 free; Future  -     T3 total; Future  -     ANTI THYROGLOBULIN ANTIBODY; Future  -     THYROID PEROXIDASE ANTIBODY; Future  -     TSH  -     T4 free  -     T3 total  -     ANTI THYROGLOBULIN ANTIBODY  -     THYROID PEROXIDASE ANTIBODY    Black stool  -     Adult Gastro Ref - Procedure Only; Future    Chief suspicion for likely Graves disease. Will repeat labs today to assess for possible acute symptoms related to recent GI issues now improving. Depending on lab results, could consider thyroid ultrasound vs FNA vs referral to endocrinology for further assessment and recommendations.     Patient is concerned for the possibility of gastric ulcer. Agree to order an upper endoscopy as previously recommended. Will follow up results as indicated. Patient was started on omeprazole in the ED. Will continue with this medication along with as needed compazine and sucralfate for now.     33 minutes spent on the date of the encounter doing chart review, history and exam, documentation and further activities as noted above.    Addendum of 8/17/2021: Repeat labs most consistent with likely graves disease. Considered starting a beta blocker at this point but patient appeared stable at yesterday's visit so will hold off for now and have patient meet with endocrinology to discuss possible management strategies.     Alberto Gold MD  [facility]  08/16/2021, 4:50 PM

## 2021-08-17 LAB
THYROGLOB AB SERPL IA-ACNC: <20 IU/ML
THYROPEROXIDASE AB SERPL-ACNC: 21 IU/ML

## 2021-08-20 ENCOUNTER — TELEPHONE (OUTPATIENT)
Dept: ENDOCRINOLOGY | Facility: CLINIC | Age: 56
End: 2021-08-20

## 2021-08-20 ENCOUNTER — HOSPITAL ENCOUNTER (EMERGENCY)
Facility: CLINIC | Age: 56
Discharge: LEFT BEFORE DISCHARGE | End: 2021-08-20
Payer: COMMERCIAL

## 2021-08-20 ENCOUNTER — TELEPHONE (OUTPATIENT)
Dept: FAMILY MEDICINE | Facility: CLINIC | Age: 56
End: 2021-08-20

## 2021-08-20 VITALS
HEART RATE: 96 BPM | TEMPERATURE: 98.1 F | BODY MASS INDEX: 21.52 KG/M2 | WEIGHT: 142 LBS | OXYGEN SATURATION: 99 % | DIASTOLIC BLOOD PRESSURE: 79 MMHG | SYSTOLIC BLOOD PRESSURE: 122 MMHG | RESPIRATION RATE: 16 BRPM | HEIGHT: 68 IN

## 2021-08-20 ASSESSMENT — MIFFLIN-ST. JEOR: SCORE: 1287.61

## 2021-08-20 NOTE — TELEPHONE ENCOUNTER
"PCP Nurse Amelie calls via [employer]: states Pt went to [facility] ER today complaining of 3 days of worsening vision and neck pain. Pt then left ER and went to Urgent Care at [facility] where she was advised to see endocrine.   Advised Nurse to inform Pt to go to ER/stabilized and if Pt returns to [facility], Endo consult is available.   Provider notified.   Rachael Camacho RN on 8/20/2021 at 12:10 PM       Appointment Information   Name: Kaylyn Fischer MRN: [id number]   Date: 10/6/2021 Status: Irish   Arrive By: 4:45 PM     Appointment Time: 5:00 PM Length: 60   Visit Type: VIDEO VISIT NEW [1702] ZAINAB:     Provider: Abbey Bolivar MD     Diagnosis Information    Diagnosis   E05.00 (ICD-10-CM) - Graves disease       Amelie Bartholomew, ERIK         8/20/21 12:10 PM  Note     I called patient back, based on her symptoms she should present back to the ED at [facility] now. A call to endocrinology clinic agreed with this. Patient verbalized understanding and agrees to return to [facility] now.   Amelie Bartholomew MS RN-BC  08/20/21  12:09 PM            Amelie Bartholomew RN         8/20/21 11:51 AM  Note     [facility] received a call from an urgent care provider asking for appointment for patient for endocrinology. I called patient and she states that she is waiting for call back from endocrinology clinic to get in sooner than currently scheduled October appointment. Chart review shows patient was seen at [facility] ED this morning, but she did not stay because she did not want to wait there for hours. I asked patient if she needed anything further from this clinic, patient replied \"no, I don't need anything at this time, I am waiting to hear back from the endocrinology clinic\". Call back if needed.   Amelie Bartholomew MS RN-BC  08/20/21  11:51 AM             "

## 2021-08-20 NOTE — TELEPHONE ENCOUNTER
I called patient back, based on her symptoms she should present back to the ED at [facility] now. A call to endocrinology clinic agreed with this. Patient verbalized understanding and agrees to return to [facility] now.   Amelie Bartholomew MS RN-BC  08/20/21  12:09 PM

## 2021-08-20 NOTE — ED TRIAGE NOTES
Pt arrived ambulatory to triage w/ c/o of worsening eye swelling and visual changes. Pt states onset of symptoms 3 days. States blurry vision, trouble tracking, and anterior neck ache. No ha. Denies fevers.

## 2021-08-20 NOTE — TELEPHONE ENCOUNTER
To schedulers : please schedule with consult service (or open new/EMILIANO) within the week. This could be a virtual visit.      Abbey Bolivar MD  Endocrine triage

## 2021-08-20 NOTE — TELEPHONE ENCOUNTER
"[facility] received a call from an urgent care provider asking for appointment for patient for endocrinology. I called patient and she states that she is waiting for call back from endocrinology clinic to get in sooner than currently scheduled October appointment. Chart review shows patient was seen at [facility] ED this morning, but she did not stay because she did not want to wait there for hours. I asked patient if she needed anything further from this clinic, patient replied \"no, I don't need anything at this time, I am waiting to hear back from the endocrinology clinic\". Call back if needed.   Amelie Bartholomew MS RN-BC  08/20/21  11:51 AM      "

## 2021-08-23 ENCOUNTER — TELEPHONE (OUTPATIENT)
Dept: GASTROENTEROLOGY | Facility: OUTPATIENT CENTER | Age: 56
End: 2021-08-23

## 2021-08-23 ENCOUNTER — TELEPHONE (OUTPATIENT)
Dept: GASTROENTEROLOGY | Facility: CLINIC | Age: 56
End: 2021-08-23

## 2021-08-23 ENCOUNTER — TELEPHONE (OUTPATIENT)
Dept: FAMILY MEDICINE | Facility: CLINIC | Age: 56
End: 2021-08-23

## 2021-08-23 DIAGNOSIS — Z11.59 ENCOUNTER FOR SCREENING FOR OTHER VIRAL DISEASES: ICD-10-CM

## 2021-08-23 NOTE — TELEPHONE ENCOUNTER
Writer reviewed pre-assessment questions with patient prior to upcoming EGD on 8.27.2021.      Covid test scheduled: 8.24.2021    Arrival time: 1250    Facility location: [facility]    Sedation type: CS    Electronic Implantable devices? No    Blood thinners/Antiplatelet medication? No    Reviewed EGD prep instructions with patient.      Designated  policy reviewed with patient.     Patient verbalized understanding.  No further questions or concerns.    Nata Coe RN

## 2021-08-23 NOTE — TELEPHONE ENCOUNTER
"Screening Questions  1. Are you active on mychart?Y    2. What insurance is in the chart?     2.  Ordering/Referring Provider:     3. BMI : 5'8\"/140=21.3    4. Are you on daily home oxygen? N    5. Do you have a history of difficult airway? N    6. Have you had a heart, lung, or liver transplant? N    7. Are you currently on dialysis? N    8. Have you had a stroke or Transient ischemic atttack (TIA) within 6 months? N    9. In the past 6 months, have you had any heart related issues including cardiomyopathy or heart attack?         If yes, did it require cardiac stenting or other implantable device? N    10. Do you have any implantable devices in your body (pacemaker, defib, LVAD)? N    11. Do you take nitroglycerin? If yes, how often? N    12. Are you currently taking any blood thinners?N    13. Are you a diabetic? N    14. (Females) Are you currently pregnant? N  If yes, how many weeks?    15. Have you had a procedure in the past that was difficult to tolerate with conscious sedation? Any allergies to Fentanyl or Versed N    16. Are you taking any scheduled prescription narcotics more than once daily? N    17. Do you have any chemical dependencies such as alcohol, street drugs, or methadone? N    18. Do you have any history of post-traumatic stress syndrome or mental health issues? N    19. Do you transfer independently? Y    20.  Do you have any issues with constipation?     21. Preferred Pharmacy for Pre Prescription     Scheduling Details    Which Colonoscopy Prep was Sent?: N/A  Procedure Scheduled: EGD-CS  Provider/Surgeon: arabella  Date of Procedure: 8/27  Location: u  Caller (Please ask for phone number if not scheduled by patient): PT      Sedation Type: CS  Conscious Sedation- Needs  for 6 hours after the procedure  MAC/General-Needs  for 24 hours after procedure    Pre-op Required at [facility], [employer], [place] and OR for MAC sedation:   (if yes advise patient they will need a pre-op " prior to procedure)      Is patient on blood thinners? -N (If yes- inform patient to follow up with PCP or provider for follow up instructions)     Informed patient they will need an adult  Y  Cannot take any type of public or medical transportation alone    Informed Patient of COVID Test Requirement Y    Confirmed Nurse will call to complete assessment     Additional comments:

## 2021-08-23 NOTE — TELEPHONE ENCOUNTER
Endocrinology Scheduling - please contact patient/spouse for scheduling this week with Dr. Bolivar.   +++++++++++++++++++++++++++++++  Patient's spouse asking for any update on Endocrine visit. Chart review shows Dr. Bolivar wants to see her this week. Will route to Endocrinology Clinic to reach out to either patient or spouse Tasneem. Symptoms persist as last week - no worsening or improvement.   Amelie Bartholomew MS RN-BC  08/23/21  9:16 AM

## 2021-08-24 ENCOUNTER — TELEPHONE (OUTPATIENT)
Dept: FAMILY MEDICINE | Facility: CLINIC | Age: 56
End: 2021-08-24

## 2021-08-24 ENCOUNTER — LAB (OUTPATIENT)
Dept: LAB | Facility: CLINIC | Age: 56
End: 2021-08-24
Payer: COMMERCIAL

## 2021-08-24 DIAGNOSIS — Z11.59 ENCOUNTER FOR SCREENING FOR OTHER VIRAL DISEASES: ICD-10-CM

## 2021-08-24 DIAGNOSIS — R11.0 NAUSEA: Primary | ICD-10-CM

## 2021-08-24 PROCEDURE — U0003 INFECTIOUS AGENT DETECTION BY NUCLEIC ACID (DNA OR RNA); SEVERE ACUTE RESPIRATORY SYNDROME CORONAVIRUS 2 (SARS-COV-2) (CORONAVIRUS DISEASE [COVID-19]), AMPLIFIED PROBE TECHNIQUE, MAKING USE OF HIGH THROUGHPUT TECHNOLOGIES AS DESCRIBED BY CMS-2020-01-R: HCPCS

## 2021-08-24 PROCEDURE — U0005 INFEC AGEN DETEC AMPLI PROBE: HCPCS

## 2021-08-24 RX ORDER — PROCHLORPERAZINE MALEATE 10 MG
10 TABLET ORAL DAILY PRN
Qty: 30 TABLET | Refills: 0 | Status: SHIPPED | OUTPATIENT
Start: 2021-08-24 | End: 2021-09-07

## 2021-08-24 RX ORDER — SUCRALFATE ORAL 1 G/10ML
1 SUSPENSION ORAL 2 TIMES DAILY PRN
Qty: 420 ML | Refills: 0 | Status: SHIPPED | OUTPATIENT
Start: 2021-08-24 | End: 2021-09-23

## 2021-08-24 NOTE — TELEPHONE ENCOUNTER
Prochlorperazine (Compazine) 10mg and Sucralfate (Carafate) 1gm/10ml    Last Office Visit: 8/16/21  [facility] Appointments: None  Medication last refilled: 8/13/21 - 3 day supply from ED    Prescription approved per Dr. Gold to get her to the Endocrinology appointment.    Kaleigh Perez, RN, BSN

## 2021-08-24 NOTE — TELEPHONE ENCOUNTER
Who is calling? Patient  Medication name: Prochlorperazine and Sucralfate  Is this a refill request? Yes  Have they contacted the pharmacy?  No  Pharmacy:     [id number] - [place], [place] - [address] AT [facility] OF  [address]  [address] [facility]  [place] [address]  Phone: [phone] Fax: [phone]    Question/Concern: Pt was prescribed these meds @ ED and is now needing refills--almost out  Would patient like a call back? Nancy Hubbard

## 2021-08-24 NOTE — TELEPHONE ENCOUNTER
APPT NOTES: Per Pt, Referred for Graves disease , by Alberto Baez   APPT DATE: 10.6.21    NOTES (FOR ALL VISITS) STATUS DETAILS   OFFICE NOTES from referring provider Internal  Alberto Gold   OFFICE NOTES from other specialist Na     ED NOTES ce  [facility]- 8.20.21 Miguel Harmon   - 8.20.21 [place]   8/13/21 [place]      OPERATIVE REPORT  (thyroid, pituitary, adrenal, parathyroid)  (All op notes related to diagnoses) Na     MEDICATION LIST Internal     IMAGING      DEXASCAN    (ALL) na    MRI (BRAIN)    (ALL) Internal /ce  Internal- 12.9.20  [facility]- 8.20.21     XR (Chest)    (ALL) na    CT (HEAD/NECK/CHEST/ABDOMEN)    (ALL) ce [facility]- 8.13.21    NUCLEAR    (ALL)  na    ULTRASOUND (HEAD/NECK) FNA BIOPSIES    (ALL) These images have pathology reports that need to be collected na    ULTRASOUND (HEAD/NECK)    (ALL) na    LABS     DIABETES: HBGA1C, CREATININE, FASTING LIPIDS, MICROALBUMIN URINE, POTASSIUM, TSH, T4    THYROID: TSH, T4, CBC, THYRODLONULIN, TOTAL T3, FREE T4, CALCITONIN, CEA Internal /ce  Cbc 8/11/21   T3- 8.13.21   TSH/T4- 8.16.21   THYROID PEROXIDASE ANTIBODY 8.16.21   ANTI THYROGLOBULIN ANTIBODY 8/16/21    PATHOLOGY REPORTS WITH CASE NUMBER  *All pathology reports, list case number related to DX  *Just report, no path slides  *Surgical path reports for endocrine organs (ovaries, testes, pancreas, etc)  NA         Action    Action Taken Image request sent to [facility]=-  MRI- 8/20/21   CT- 8/13/21

## 2021-08-25 LAB — SARS-COV-2 RNA RESP QL NAA+PROBE: NEGATIVE

## 2021-08-26 ENCOUNTER — TELEPHONE (OUTPATIENT)
Dept: FAMILY MEDICINE | Facility: CLINIC | Age: 56
End: 2021-08-26

## 2021-08-26 NOTE — TELEPHONE ENCOUNTER
"Hi -  Pt's spouse, Tasneem Mittal, calling clinic today. Very concerned about Kaylyn's declining health. See recent visit with PCP, Dr Alberto Gold, labs, ER visits.   Pt with suspected Graves disease. Tasneem reports pt is \"mostly on the couch:\"  Can not drive, barely working, losing weight, feels hot all the time, has vision problems, rapid pulse. Please see Dr Bolivar's note 8/20 that she wanted pt seen this week.   Please facilitate appt by next week if possible , for this pt. Currently has appt set up for 10/6, but pt needs sooner appt due to failing health issues.   Call Tasneem for scheduling [phone].  Thanks,  Dominique Ramirez RN  [facility]  "

## 2021-08-26 NOTE — TELEPHONE ENCOUNTER
Unable to reach Tasneem Black LM for her to call back.  Dominique Ramirez RN  [facility]

## 2021-08-26 NOTE — TELEPHONE ENCOUNTER
APPT NOTES: Per Pt, Referred for Graves disease , by Alberto Baez   APPT DATE: 8.31.21    NOTES (FOR ALL VISITS) STATUS DETAILS   OFFICE NOTES from referring provider Alberto Ardon   OFFICE NOTES from other specialist Na      ED NOTES ce  [facility]- 8.20.21 Miguel Harmon   - 8.20.21 [place]   8/13/21 Carl       OPERATIVE REPORT  (thyroid, pituitary, adrenal, parathyroid)  (All op notes related to diagnoses) Na      MEDICATION LIST Internal      IMAGING        DEXASCAN     (ALL) na     MRI (BRAIN)     (ALL) Internal /ce  Internal- 12.9.20  [facility]- 8.20.21     XR (Chest)     (ALL) na     CT (HEAD/NECK/CHEST/ABDOMEN)     (ALL) ce [facility]- 8.13.21    NUCLEAR     (ALL)  na     ULTRASOUND (HEAD/NECK) FNA BIOPSIES     (ALL) These images have pathology reports that need to be collected na     ULTRASOUND (HEAD/NECK)     (ALL) na     LABS       DIABETES: HBGA1C, CREATININE, FASTING LIPIDS, MICROALBUMIN URINE, POTASSIUM, TSH, T4     THYROID: TSH, T4, CBC, THYRODLONULIN, TOTAL T3, FREE T4, CALCITONIN, CEA Internal /ce  Cbc 8/20/21   T3- 8.13.21   TSH/T4- 8.16.21   THYROID PEROXIDASE ANTIBODY 8.16.21   ANTI THYROGLOBULIN ANTIBODY 8/16/21    PATHOLOGY REPORTS WITH CASE NUMBER  *All pathology reports, list case number related to DX  *Just report, no path slides  *Surgical path reports for endocrine organs (ovaries, testes, pancreas, etc)  NA

## 2021-08-27 ENCOUNTER — HOSPITAL ENCOUNTER (OUTPATIENT)
Facility: CLINIC | Age: 56
Discharge: HOME | End: 2021-08-27
Attending: INTERNAL MEDICINE | Admitting: INTERNAL MEDICINE
Payer: COMMERCIAL

## 2021-08-27 VITALS
SYSTOLIC BLOOD PRESSURE: 102 MMHG | HEIGHT: 68 IN | HEART RATE: 109 BPM | WEIGHT: 137.79 LBS | OXYGEN SATURATION: 97 % | TEMPERATURE: 98.9 F | DIASTOLIC BLOOD PRESSURE: 67 MMHG | RESPIRATION RATE: 18 BRPM | BODY MASS INDEX: 20.88 KG/M2

## 2021-08-27 LAB — UPPER GI ENDOSCOPY: NORMAL

## 2021-08-27 PROCEDURE — 43235 EGD DIAGNOSTIC BRUSH WASH: CPT | Performed by: INTERNAL MEDICINE

## 2021-08-27 PROCEDURE — 250N000011 HC RX IP 250 OP 636: Performed by: INTERNAL MEDICINE

## 2021-08-27 PROCEDURE — 999N000099 HC STATISTIC MODERATE SEDATION < 10 MIN: Performed by: INTERNAL MEDICINE

## 2021-08-27 RX ORDER — NALOXONE HYDROCHLORIDE 0.4 MG/ML
0.4 INJECTION, SOLUTION INTRAMUSCULAR; INTRAVENOUS; SUBCUTANEOUS
Status: DISCONTINUED | OUTPATIENT
Start: 2021-08-27 | End: 2021-08-27 | Stop reason: HOSPADM

## 2021-08-27 RX ORDER — ONDANSETRON 2 MG/ML
4 INJECTION INTRAMUSCULAR; INTRAVENOUS EVERY 6 HOURS PRN
Status: DISCONTINUED | OUTPATIENT
Start: 2021-08-27 | End: 2021-08-27 | Stop reason: HOSPADM

## 2021-08-27 RX ORDER — PROCHLORPERAZINE MALEATE 10 MG
10 TABLET ORAL EVERY 6 HOURS PRN
Status: DISCONTINUED | OUTPATIENT
Start: 2021-08-27 | End: 2021-08-27 | Stop reason: HOSPADM

## 2021-08-27 RX ORDER — FENTANYL CITRATE 50 UG/ML
INJECTION, SOLUTION INTRAMUSCULAR; INTRAVENOUS PRN
Status: COMPLETED | OUTPATIENT
Start: 2021-08-27 | End: 2021-08-27

## 2021-08-27 RX ORDER — FLUMAZENIL 0.1 MG/ML
0.2 INJECTION, SOLUTION INTRAVENOUS
Status: DISCONTINUED | OUTPATIENT
Start: 2021-08-27 | End: 2021-08-27 | Stop reason: HOSPADM

## 2021-08-27 RX ORDER — ONDANSETRON 4 MG/1
4 TABLET, ORALLY DISINTEGRATING ORAL EVERY 6 HOURS PRN
Status: DISCONTINUED | OUTPATIENT
Start: 2021-08-27 | End: 2021-08-27 | Stop reason: HOSPADM

## 2021-08-27 RX ORDER — NALOXONE HYDROCHLORIDE 0.4 MG/ML
0.2 INJECTION, SOLUTION INTRAMUSCULAR; INTRAVENOUS; SUBCUTANEOUS
Status: DISCONTINUED | OUTPATIENT
Start: 2021-08-27 | End: 2021-08-27 | Stop reason: HOSPADM

## 2021-08-27 RX ADMIN — MIDAZOLAM 1 MG: 1 INJECTION INTRAMUSCULAR; INTRAVENOUS at 13:37

## 2021-08-27 RX ADMIN — MIDAZOLAM 2 MG: 1 INJECTION INTRAMUSCULAR; INTRAVENOUS at 13:35

## 2021-08-27 RX ADMIN — FENTANYL CITRATE 50 MCG: 50 INJECTION, SOLUTION INTRAMUSCULAR; INTRAVENOUS at 13:37

## 2021-08-27 RX ADMIN — FENTANYL CITRATE 50 MCG: 50 INJECTION, SOLUTION INTRAMUSCULAR; INTRAVENOUS at 13:35

## 2021-08-27 ASSESSMENT — MIFFLIN-ST. JEOR: SCORE: 1268.5

## 2021-08-30 NOTE — PROGRESS NOTES
graves disease  JERROD Camerony is a 55 year old who is being evaluated via a billable video visit.      How would you like to obtain your AVS? MyChart  If the video visit is dropped, the invitation should be resent by: Text to cell phone: [phone]  Will anyone else be joining your video visit? No

## 2021-08-31 ENCOUNTER — PRE VISIT (OUTPATIENT)
Dept: ENDOCRINOLOGY | Facility: CLINIC | Age: 56
End: 2021-08-31

## 2021-08-31 ENCOUNTER — LAB (OUTPATIENT)
Dept: LAB | Facility: CLINIC | Age: 56
End: 2021-08-31
Payer: COMMERCIAL

## 2021-08-31 ENCOUNTER — VIRTUAL VISIT (OUTPATIENT)
Dept: ENDOCRINOLOGY | Facility: CLINIC | Age: 56
End: 2021-08-31
Payer: COMMERCIAL

## 2021-08-31 DIAGNOSIS — E05.00 GRAVES DISEASE: Primary | ICD-10-CM

## 2021-08-31 DIAGNOSIS — E05.00 GRAVES DISEASE: ICD-10-CM

## 2021-08-31 LAB
T4 FREE SERPL-MCNC: 3.12 NG/DL (ref 0.76–1.46)
TSH SERPL DL<=0.005 MIU/L-ACNC: <0.01 MU/L (ref 0.4–4)

## 2021-08-31 PROCEDURE — 99204 OFFICE O/P NEW MOD 45 MIN: CPT | Mod: GT | Performed by: INTERNAL MEDICINE

## 2021-08-31 PROCEDURE — 84439 ASSAY OF FREE THYROXINE: CPT | Performed by: INTERNAL MEDICINE

## 2021-08-31 PROCEDURE — 84443 ASSAY THYROID STIM HORMONE: CPT | Performed by: INTERNAL MEDICINE

## 2021-08-31 PROCEDURE — 84480 ASSAY TRIIODOTHYRONINE (T3): CPT | Performed by: INTERNAL MEDICINE

## 2021-08-31 PROCEDURE — 84445 ASSAY OF TSI GLOBULIN: CPT | Performed by: INTERNAL MEDICINE

## 2021-08-31 RX ORDER — DEXTRAN 70/HYPROMELLOSE
1 DROPS OPHTHALMIC (EYE) 2 TIMES DAILY
Qty: 15 ML | Refills: 3 | Status: SHIPPED | OUTPATIENT
Start: 2021-08-31 | End: 2022-09-02

## 2021-08-31 RX ORDER — PROPRANOLOL HYDROCHLORIDE 10 MG/1
10 TABLET ORAL 3 TIMES DAILY
Qty: 90 TABLET | Refills: 3 | Status: SHIPPED | OUTPATIENT
Start: 2021-08-31 | End: 2022-03-04

## 2021-08-31 RX ORDER — METHIMAZOLE 10 MG/1
30 TABLET ORAL DAILY
Qty: 90 TABLET | Refills: 3 | Status: SHIPPED | OUTPATIENT
Start: 2021-08-31 | End: 2021-09-29

## 2021-08-31 NOTE — PROGRESS NOTES
Endocrinology Fellow note    Chief complaint:  Kaylyn is a 55 year old female seen in consultation at the request of Dr. Alberto Gold MD.    ASSESSMENT/PLAN:     Hyperthyroidism  Likely secondary to Graves  TFTs are consistent with hyperthyroidism and she is having marked symptoms of hyperthyroidism including eye related symptoms. We will repeat TFTs and check a TSI level to evaluate for a likely underlying autoimmune process. Given her recent iodine-contrast load for CTAP imaging two weeks ago, a confirmatory RAIU scan will need to be deferred for two months. Will obtain a TSI level for confirmatory testing for Graves, however given the compelling clinical picture for Graves, we will initiate therapy with methimazole and propranolol. Will also prescribe eye drops. She already has an appointment with Ophthalmology in October scheduled.    - Propranolol 10 mg TID  - Methimazole 30 mg daily   - Repeat TFTs including FT4, T3, TSH and TSI ordered  - Defer RAIU scan at this time due to contrast load two weeks ago  - Advised to use lubricating eye drops  - F/U in 4 weeks; will remain in touch via [employer] in the interim for dose titration    HISTORY OF PRESENT ILLNESS    Ms. Fischer had initially presented to the [facility] ED on 08/13/21 for nausea, emesis, abdominal pain and inability to tolerate per oral intake. TFTs were checked and were consistent with thyrotoxicosis.  She endorses weight loss of ~ 25 lbs over the past month. Has been having heat intolerance, tremors and anxiety. Has also had visual difficulties; has noted swelling of her upper and lower eyelids, proptosis of her eyes bilaterally L>R and blurred vision. Denies any diplopia. Presented to the ED on 08/20/21 with these vision difficulties and was discharged home at the time with plan for ophthalmology follow up in October.   Has been having palpitations for the past week. Dyspnea on exertion noted.   Intermittent and very mild dysphagia. Has not noted  marked goiter, although wife does note diffuse swelling of the thyroid gland.   TSH <0.01, T4 elevated to 4.03 and T3 336.   Thg Ab (<20)and TPO (21) also ordered and negative. No TSI on file.   Notes receiving a contrast load for a CTAP for evaluation of abdominal cramps and nausea 2 weeks ago.   Not taking any medications at this time apart from sucralfate and omeprazole as well as intermittent compazine.   No family history of thyroid disease.   Quit smoking one month ago.     REVIEW OF SYSTEMS  10 system ROS otherwise as per the HPI or negative    Past Medical History  Past Medical History:   Diagnosis Date     Adhesive capsulitis of right shoulder 4/2016 & 9/2016    cortisone injection & manipulation with anesthesia      Genital herpes     Valtrex     Genital warts     Aldara     PMDD (premenstrual dysphoric disorder) 2010    Lexapro       Medications  Current Outpatient Medications   Medication     calcium carbonate-vitamin D 500-400 MG-UNIT TABS tablt     escitalopram (LEXAPRO) 20 MG tablet     estradiol (CLIMARA) 0.05 MG/24HR weekly patch     omeprazole (PRILOSEC) 20 MG DR capsule     prochlorperazine (COMPAZINE) 10 MG tablet     sucralfate (CARAFATE) 1 GM/10ML suspension     valACYclovir (VALTREX) 500 MG tablet     No current facility-administered medications for this visit.       Current Outpatient Medications   Medication Sig Dispense Refill     calcium carbonate-vitamin D 500-400 MG-UNIT TABS tablt Take 1 tablet by mouth daily 500 mg 180 tablet 3     escitalopram (LEXAPRO) 20 MG tablet Take 1 tablet (20 mg) by mouth daily 90 tablet 3     estradiol (CLIMARA) 0.05 MG/24HR weekly patch APPLY 1 PATCH TOPICALLY TO  SKIN ONCE A WEEK 12 patch 11     omeprazole (PRILOSEC) 20 MG DR capsule Take 20 mg by mouth       prochlorperazine (COMPAZINE) 10 MG tablet Take 1 tablet (10 mg) by mouth daily as needed for nausea or vomiting 30 tablet 0     sucralfate (CARAFATE) 1 GM/10ML suspension Take 10 mLs (1 g) by mouth 2  times daily as needed for nausea 420 mL 0     valACYclovir (VALTREX) 500 MG tablet TAKE 1 TABLET BY MOUTH 2  TIMES DAILY FOR 3 DAY PER  OUTBREAK 30 tablet 6       Allergies  No Known Allergies      Family History  family history includes Heart Disease in her sister.    Social History  Social History     Tobacco Use     Smoking status: Former Smoker     Packs/day: 0.50     Years: 20.00     Pack years: 10.00     Types: Cigarettes     Quit date: 2003     Years since quittin.5     Smokeless tobacco: Never Used   Vaping Use     Vaping Use: Never used   Substance Use Topics     Alcohol use: Yes     Alcohol/week: 0.0 standard drinks     Comment: 7 drinks week     Drug use: No     Physical Exam  LMP 2016 (Exact Date)   There is no height or weight on file to calculate BMI.  GENERAL: Healthy, alert and no distress  EYES: Eyes grossly normal to inspection.  No discharge or erythema, or obvious scleral/conjunctival abnormalities.  NECK: mild fullness over thyroid gland noted.   RESP: No audible wheeze, cough, or visible cyanosis.  No visible retractions or increased work of breathing.    SKIN: Visible skin clear. No significant rash, abnormal pigmentation or lesions.  NEURO: Cranial nerves grossly intact.  Mentation and speech appropriate for age.  PSYCH: Mentation appears normal, affect normal/bright, judgement and insight intact, normal speech and appearance well-groomed.    DATA REVIEW    ENDO THYROID LABS-[facility] Latest Ref Rng & Units 2021 6/15/2015   TSH 0.40 - 4.00 mU/L <0.01 (L) 0.89   T3 60 - 181 ng/dL 336 (H)    T4 0.76 - 1.46 ng/dL 4.03 (H)    THYROGLOBULIN SCAR <40 IU/mL <20    THYR PEROXIDASE SCAR <35 IU/mL 21      ENDO THYROID LABS-[facility] Latest Ref Rng & Units 2010   TSH 0.40 - 4.00 mU/L 1.08   T3 60 - 181 ng/dL    T4 0.76 - 1.46 ng/dL 1.10   THYROGLOBULIN SCAR <40 IU/mL    THYR PEROXIDASE SCAR <35 IU/mL        Video-Visit Details    Type of service:  Video Visit    Video Start Time (time video  started): 9.00    Video End Time (time video stopped): 9.48    Originating Location (pt. Location): Home    Distant Location (provider location):  [facility] [place]     Mode of Communication:  Video Conference via [facility]    Physician has received verbal consent for a Video Visit from the patient? Yes      Cherelle Meléndez MD  Endocrinology Fellow, PGY4      Physician Attestation   I saw this patient with Dr. Meléndez and agree with the findings and plan of care as documented in the note.      My video time in direct contact with this patient:   Start: 9:28 AM  End: 9:48 AM    Casandra Yousif MD  Endocrinology and Diabetes   [phone]

## 2021-08-31 NOTE — PATIENT INSTRUCTIONS
"Please start taking methimazole 30 mg daily.    Please start taking propranolol 10 mg three times a day.    We have ordered blood work: TSH, FT4, Total T3, TSI, please schedule a lab appointment to have these drawn.     For scheduling appointments or labs, please request an appointment through [employer] or call [phone] select option #3 for triage nurse    For questions for your provider or the endocrine nurse, please send a [employer] message or call [phone] select option #3 for triage nurse    If this is an emergency overnight or on weekends, please call [phone]. This will get you the [employer] . Please ask for adult endocrinology \"on call\" and they will connect you to one of my colleagues who will always be available.            "

## 2021-08-31 NOTE — LETTER
8/31/2021       RE: Kaylyn Fischer  [address]  [place] [address]     Dear Colleague,    Thank you for referring your patient, Kaylyn Fischer, to the [facility] [place] at [facility]. Please see a copy of my visit note below.    graves disease  Parish GaribayJERROD is a 55 year old who is being evaluated via a billable video visit.      How would you like to obtain your AVS? [employer]  If the video visit is dropped, the invitation should be resent by: Text to cell phone: [phone]  Will anyone else be joining your video visit? No        Endocrinology Fellow note    Chief complaint:  Kaylyn is a 55 year old female seen in consultation at the request of Dr. Alberto Gold MD.    ASSESSMENT/PLAN:     Hyperthyroidism  Likely secondary to Graves  TFTs are consistent with hyperthyroidism and she is having marked symptoms of hyperthyroidism including eye related symptoms. We will repeat TFTs and check a TSI level to evaluate for a likely underlying autoimmune process. Given her recent iodine-contrast load for CTAP imaging two weeks ago, a confirmatory RAIU scan will need to be deferred for two months. Will obtain a TSI level for confirmatory testing for Graves, however given the compelling clinical picture for Graves, we will initiate therapy with methimazole and propranolol. Will also prescribe eye drops. She already has an appointment with Ophthalmology in October scheduled.    - Propranolol 10 mg TID  - Methimazole 30 mg daily   - Repeat TFTs including FT4, T3, TSH and TSI ordered  - Defer RAIU scan at this time due to contrast load two weeks ago  - Advised to use lubricating eye drops  - F/U in 4 weeks; will remain in touch via [employer] in the interim for dose titration    HISTORY OF PRESENT ILLNESS    Ms. Fischer had initially presented to the [facility] ED on 08/13/21 for nausea, emesis, abdominal pain and  inability to tolerate per oral intake. TFTs were checked and were consistent with thyrotoxicosis.  She endorses weight loss of ~ 25 lbs over the past month. Has been having heat intolerance, tremors and anxiety. Has also had visual difficulties; has noted swelling of her upper and lower eyelids, proptosis of her eyes bilaterally L>R and blurred vision. Denies any diplopia. Presented to the ED on 08/20/21 with these vision difficulties and was discharged home at the time with plan for ophthalmology follow up in October.   Has been having palpitations for the past week. Dyspnea on exertion noted.   Intermittent and very mild dysphagia. Has not noted marked goiter, although wife does note diffuse swelling of the thyroid gland.   TSH <0.01, T4 elevated to 4.03 and T3 336.   Thg Ab (<20)and TPO (21) also ordered and negative. No TSI on file.   Notes receiving a contrast load for a CTAP for evaluation of abdominal cramps and nausea 2 weeks ago.   Not taking any medications at this time apart from sucralfate and omeprazole as well as intermittent compazine.   No family history of thyroid disease.   Quit smoking one month ago.     REVIEW OF SYSTEMS  10 system ROS otherwise as per the HPI or negative    Past Medical History  Past Medical History:   Diagnosis Date     Adhesive capsulitis of right shoulder 4/2016 & 9/2016    cortisone injection & manipulation with anesthesia      Genital herpes     Valtrex     Genital warts     Aldara     PMDD (premenstrual dysphoric disorder) 2010    Lexapro       Medications  Current Outpatient Medications   Medication     calcium carbonate-vitamin D 500-400 MG-UNIT TABS tablt     escitalopram (LEXAPRO) 20 MG tablet     estradiol (CLIMARA) 0.05 MG/24HR weekly patch     omeprazole (PRILOSEC) 20 MG DR capsule     prochlorperazine (COMPAZINE) 10 MG tablet     sucralfate (CARAFATE) 1 GM/10ML suspension     valACYclovir (VALTREX) 500 MG tablet     No current facility-administered medications for  this visit.       Current Outpatient Medications   Medication Sig Dispense Refill     calcium carbonate-vitamin D 500-400 MG-UNIT TABS tablt Take 1 tablet by mouth daily 500 mg 180 tablet 3     escitalopram (LEXAPRO) 20 MG tablet Take 1 tablet (20 mg) by mouth daily 90 tablet 3     estradiol (CLIMARA) 0.05 MG/24HR weekly patch APPLY 1 PATCH TOPICALLY TO  SKIN ONCE A WEEK 12 patch 11     omeprazole (PRILOSEC) 20 MG DR capsule Take 20 mg by mouth       prochlorperazine (COMPAZINE) 10 MG tablet Take 1 tablet (10 mg) by mouth daily as needed for nausea or vomiting 30 tablet 0     sucralfate (CARAFATE) 1 GM/10ML suspension Take 10 mLs (1 g) by mouth 2 times daily as needed for nausea 420 mL 0     valACYclovir (VALTREX) 500 MG tablet TAKE 1 TABLET BY MOUTH 2  TIMES DAILY FOR 3 DAY PER  OUTBREAK 30 tablet 6       Allergies  No Known Allergies      Family History  family history includes Heart Disease in her sister.    Social History  Social History     Tobacco Use     Smoking status: Former Smoker     Packs/day: 0.50     Years: 20.00     Pack years: 10.00     Types: Cigarettes     Quit date: 2003     Years since quittin.5     Smokeless tobacco: Never Used   Vaping Use     Vaping Use: Never used   Substance Use Topics     Alcohol use: Yes     Alcohol/week: 0.0 standard drinks     Comment: 7 drinks week     Drug use: No     Physical Exam  LMP 2016 (Exact Date)   There is no height or weight on file to calculate BMI.  GENERAL: Healthy, alert and no distress  EYES: Eyes grossly normal to inspection.  No discharge or erythema, or obvious scleral/conjunctival abnormalities.  NECK: mild fullness over thyroid gland noted.   RESP: No audible wheeze, cough, or visible cyanosis.  No visible retractions or increased work of breathing.    SKIN: Visible skin clear. No significant rash, abnormal pigmentation or lesions.  NEURO: Cranial nerves grossly intact.  Mentation and speech appropriate for age.  PSYCH: Mentation  appears normal, affect normal/bright, judgement and insight intact, normal speech and appearance well-groomed.    DATA REVIEW    ENDO THYROID LABS-[facility] Latest Ref Rng & Units 8/16/2021 6/15/2015   TSH 0.40 - 4.00 mU/L <0.01 (L) 0.89   T3 60 - 181 ng/dL 336 (H)    T4 0.76 - 1.46 ng/dL 4.03 (H)    THYROGLOBULIN SCAR <40 IU/mL <20    THYR PEROXIDASE SCAR <35 IU/mL 21      ENDO THYROID LABS-[facility] Latest Ref Rng & Units 4/1/2010   TSH 0.40 - 4.00 mU/L 1.08   T3 60 - 181 ng/dL    T4 0.76 - 1.46 ng/dL 1.10   THYROGLOBULIN SCAR <40 IU/mL    THYR PEROXIDASE SCAR <35 IU/mL        Video-Visit Details    Type of service:  Video Visit    Video Start Time (time video started): 9.00    Video End Time (time video stopped): 9.48    Originating Location (pt. Location): Home    Distant Location (provider location):  [facility] [place]     Mode of Communication:  Video Conference via [facility]    Physician has received verbal consent for a Video Visit from the patient? Yes      Cherelle Meléndez MD  Endocrinology Fellow, PGY4      Physician Attestation   I saw this patient with Dr. Meléndez and agree with the findings and plan of care as documented in the note.      My video time in direct contact with this patient:   Start: 9:28 AM  End: 9:48 AM    Casandra Yousif MD  Endocrinology and Diabetes   [phone]

## 2021-09-01 LAB — T3 SERPL-MCNC: 414 NG/DL (ref 60–181)

## 2021-09-04 DIAGNOSIS — E05.90 HYPERTHYROIDISM: Primary | ICD-10-CM

## 2021-09-06 LAB — TSI SER-ACNC: 3.3 TSI INDEX

## 2021-09-07 ENCOUNTER — MYC REFILL (OUTPATIENT)
Dept: FAMILY MEDICINE | Facility: CLINIC | Age: 56
End: 2021-09-07

## 2021-09-07 DIAGNOSIS — R11.0 NAUSEA: ICD-10-CM

## 2021-09-07 RX ORDER — PROCHLORPERAZINE MALEATE 10 MG
10 TABLET ORAL DAILY PRN
Qty: 30 TABLET | Refills: 0 | Status: SHIPPED | OUTPATIENT
Start: 2021-09-07 | End: 2021-09-08

## 2021-09-07 NOTE — TELEPHONE ENCOUNTER
Med refilled as requested.    Kaylyn was seen today for refill request.    Diagnoses and all orders for this visit:    Nausea  -     prochlorperazine (COMPAZINE) 10 MG tablet; Take 1 tablet (10 mg) by mouth daily as needed for nausea or vomiting      Alberto Gold MD  5:12 PM, September 7, 2021

## 2021-09-08 ENCOUNTER — MYC MEDICAL ADVICE (OUTPATIENT)
Dept: FAMILY MEDICINE | Facility: CLINIC | Age: 56
End: 2021-09-08

## 2021-09-08 DIAGNOSIS — R11.0 NAUSEA: ICD-10-CM

## 2021-09-08 RX ORDER — PROCHLORPERAZINE MALEATE 10 MG
10 TABLET ORAL EVERY 8 HOURS PRN
Qty: 90 TABLET | Refills: 0 | Status: SHIPPED | OUTPATIENT
Start: 2021-09-08 | End: 2021-10-01

## 2021-09-08 NOTE — TELEPHONE ENCOUNTER
New Rx sent as requested.    Diagnoses and all orders for this visit:    Nausea  -     prochlorperazine (COMPAZINE) 10 MG tablet; Take 1 tablet (10 mg) by mouth every 8 hours as needed for nausea or vomiting      Alberto Gold MD  12:32 PM, September 8, 2021

## 2021-09-16 ENCOUNTER — LAB (OUTPATIENT)
Dept: LAB | Facility: CLINIC | Age: 56
End: 2021-09-16
Payer: COMMERCIAL

## 2021-09-16 DIAGNOSIS — E05.90 HYPERTHYROIDISM: ICD-10-CM

## 2021-09-16 LAB — T3 SERPL-MCNC: 103 NG/DL (ref 60–181)

## 2021-09-16 PROCEDURE — 84480 ASSAY TRIIODOTHYRONINE (T3): CPT

## 2021-09-16 PROCEDURE — 84443 ASSAY THYROID STIM HORMONE: CPT

## 2021-09-16 PROCEDURE — 36415 COLL VENOUS BLD VENIPUNCTURE: CPT

## 2021-09-16 PROCEDURE — 84439 ASSAY OF FREE THYROXINE: CPT

## 2021-09-18 LAB
T4 FREE SERPL-MCNC: 0.66 NG/DL (ref 0.76–1.46)
TSH SERPL DL<=0.005 MIU/L-ACNC: <0.01 MU/L (ref 0.4–4)

## 2021-09-19 ENCOUNTER — HEALTH MAINTENANCE LETTER (OUTPATIENT)
Age: 56
End: 2021-09-19

## 2021-09-24 ENCOUNTER — LAB (OUTPATIENT)
Dept: LAB | Facility: CLINIC | Age: 56
End: 2021-09-24
Payer: COMMERCIAL

## 2021-09-24 ENCOUNTER — VIRTUAL VISIT (OUTPATIENT)
Dept: ENDOCRINOLOGY | Facility: CLINIC | Age: 56
End: 2021-09-24
Payer: COMMERCIAL

## 2021-09-24 DIAGNOSIS — E05.90 HYPERTHYROIDISM: ICD-10-CM

## 2021-09-24 DIAGNOSIS — E05.00 GRAVES DISEASE: Primary | ICD-10-CM

## 2021-09-24 DIAGNOSIS — E05.00 GRAVES DISEASE: ICD-10-CM

## 2021-09-24 DIAGNOSIS — F41.9 ANXIETY: ICD-10-CM

## 2021-09-24 LAB
T3 SERPL-MCNC: 72 NG/DL (ref 60–181)
T4 FREE SERPL-MCNC: 0.44 NG/DL (ref 0.76–1.46)
TSH SERPL DL<=0.005 MIU/L-ACNC: 0.26 MU/L (ref 0.4–4)

## 2021-09-24 PROCEDURE — 99215 OFFICE O/P EST HI 40 MIN: CPT | Mod: GT | Performed by: INTERNAL MEDICINE

## 2021-09-24 PROCEDURE — 84439 ASSAY OF FREE THYROXINE: CPT | Performed by: INTERNAL MEDICINE

## 2021-09-24 PROCEDURE — 84443 ASSAY THYROID STIM HORMONE: CPT | Performed by: INTERNAL MEDICINE

## 2021-09-24 PROCEDURE — 84480 ASSAY TRIIODOTHYRONINE (T3): CPT | Performed by: INTERNAL MEDICINE

## 2021-09-24 RX ORDER — LORAZEPAM 0.5 MG/1
0.5 TABLET ORAL
Qty: 30 TABLET | Refills: 0 | Status: SHIPPED | OUTPATIENT
Start: 2021-09-24 | End: 2022-03-04

## 2021-09-24 NOTE — PROGRESS NOTES
graves disease  Parish GaribayJERROD is a 56 year old who is being evaluated via a billable video visit.      How would you like to obtain your AVS? MyChart  If the video visit is dropped, the invitation should be resent by: Text to cell phone: [phone]  Will anyone else be joining your video visit? Yes: wife(joseph). How would they like to receive their invitation? Text to cell phone: [phone]        Endocrinology and Diabetes Clinic Return Visit    Subjective:   Kaylyn Fischer is a 56 year old woman seen today for a follow up visit for hyperthyroidism due to Graves Disease. Due to the COVID-19 pandemic today's visit was completed virtually.     She was diagnosed with hyperthyroidism in August 2021, and following her initial visit in this clinic TSI was checked and was positive (3.3, normal range less than 1.3), consistent with an underlying diagnosis of Graves' disease.  She started methimazole 30 mg daily 8/31/2021, and this was decreased to 20 mg daily on 9/21.    She has not been feeling well this week.  She has significant insomnia and anxiety which have been present for about a week and are very bothersome.  She has tried multiple medications to help with sleep, including Benadryl and CBD.  However, these have not been very effective.  She endorses temperature swings - she did stop using her estradiol patch when she initially became ill with hyperthyroidism, and restarted this last night and that helped somewhat with symptoms.  She also notes more frequent stools and racing heartbeat for which she takes propranolol 1-2 times per day.  No tremor.    She is concerned about her eyes.  She has swelling around both of her eyes.  She notes that a cooling mask has been helpful for this.  She endorses blurry vision and some double vision when she gets up in the morning this week. She has noticed if she sleeps on her side one eye will be more swollen and this may cause double vision.  She has an  "upcoming appointment with ophthalmology.    ENDO THYROID LABS-UMP TSH T3 FREE T4   Latest Ref Rng & Units 0.40 - 4.00 mU/L 60 - 181 ng/dL 0.76 - 1.46 ng/dL   2021 <0.01 (L) 103 0.66 (L)   2021 <0.01 (L) 414 (H) 3.12 (H)   2021 <0.01 (L) 336 (H) 4.03 (H)   6/15/2015 0.89     2010 1.08  1.10       Current Outpatient Medications   Medication Instructions     Artificial Tear Solution (JUST TEARS EYE DROPS) SOLN 1 drop, Both Eyes, 2 TIMES DAILY     calcium carbonate-vitamin D 500-400 MG-UNIT TABS tablt 1 tablet, Oral, DAILY, 500 mg     escitalopram (LEXAPRO) 20 mg, Oral, DAILY     estradiol (CLIMARA) 0.05 MG/24HR weekly patch APPLY 1 PATCH TOPICALLY TO  SKIN ONCE A WEEK     methimazole (TAPAZOLE) 30 mg, Oral, DAILY     omeprazole (PRILOSEC) 20 mg, Oral     prochlorperazine (COMPAZINE) 10 mg, Oral, EVERY 8 HOURS PRN     propranolol (INDERAL) 10 mg, Oral, 3 TIMES DAILY     sucralfate (CARAFATE) 1 g, Oral, 2 TIMES DAILY PRN     valACYclovir (VALTREX) 500 MG tablet TAKE 1 TABLET BY MOUTH 2  TIMES DAILY FOR 3 DAY PER  OUTBREAK       Social History     Tobacco Use     Smoking status: Former Smoker     Packs/day: 0.50     Years: 20.00     Pack years: 10.00     Types: Cigarettes     Quit date: 2003     Years since quittin.6     Smokeless tobacco: Never Used   Substance Use Topics     Alcohol use: Yes     Alcohol/week: 0.0 standard drinks     Comment: 7 drinks week       Review of Systems: A comprehensive ROS was negative except as noted in HPI.     Physical Examination:  Estimated body mass index is 20.95 kg/m  as calculated from the following:    Height as of 21: 1.727 m (5' 8\").    Weight as of 21: 62.5 kg (137 lb 12.6 oz).    Wt Readings from Last 4 Encounters:   21 62.5 kg (137 lb 12.6 oz)   21 64.4 kg (142 lb)   21 66 kg (145 lb 8 oz)   21 65.7 kg (144 lb 12.8 oz)     GENERAL: Alert and no distress  EYES: Periorbital edema. Watery eyes.   RESP: No audible wheeze, " cough, or visible cyanosis.  Breathing and speaking comfortably.   SKIN: Visible skin clear.   NEURO: Cranial nerves grossly intact.    PSYCH: Mentation appears normal, affect anxious, judgement and insight intact, normal speech.       Assessment and Plan:    Hyperthyroidism secondary to Graves  -Reduce methimazole to 10 mg daily  -Given rapid changes in hormone levels and significant symptoms she is experiencing, we made a plan to follow her thyroid hormone levels weekly until stabilized.  She already has an appointment for Monday and will keep this as well.    Anxiety and insomnia.  Recent symptoms are likely driven by hyperthyroidism and Graves' disease, perhaps with an underlying component of chronic anxiety.  -Referral to health pyschology    -Trial of a small dose of lorazepam at bedtime to help with anxiety and insomnia in the short-term.  We discussed today that this would not be a long-term solution, but hopefully will help until we get her thyroid hormone levels more stable.    Thyroid eye disease.  Rapid changes in thyroid hormone levels may have contributed to increased periorbital edema.   -Advised to use lubricating eye drops  -Quit smoking 2 months ago   -Could try a selenium supplement to see if this helps with the symptoms  -Appointment in [facility] already scheduled for next month    Recent iodine-contrast load for CTAP, now 6 weeks ago (8/13/2021).     Follow up in clinic in 2 months.       50 minutes spent on the date of the encounter doing chart review, history and exam, documentation and further activities per the note      Video-Visit Details    Type of service:  Video Visit    Video Start Time: 2:36 PM    Video End Time: 3:10 PM    Platform used for Video Visit: Sarita Yousif MD   Diabetes and Endocrinology   [phone]

## 2021-09-24 NOTE — LETTER
9/24/2021       RE: Kaylyn Fischer  [address]  [place] [address]     Dear Colleague,    Thank you for referring your patient, Kaylyn Fischer, to the [facility] [place] [facility]. Please see a copy of my visit note below.    graves disease  JERROD Cameron is a 56 year old who is being evaluated via a billable video visit.      How would you like to obtain your AVS? MyChart  If the video visit is dropped, the invitation should be resent by: Text to cell phone: [phone]  Will anyone else be joining your video visit? Yes: wife(joseph). How would they like to receive their invitation? Text to cell phone: [phone]        Endocrinology and Diabetes Clinic Return Visit    Subjective:   Kaylyn Fischer is a 56 year old woman seen today for a follow up visit for hyperthyroidism due to Graves Disease. Due to the COVID-19 pandemic today's visit was completed virtually.     She was diagnosed with hyperthyroidism in August 2021, and following her initial visit in this clinic TSI was checked and was positive (3.3, normal range less than 1.3), consistent with an underlying diagnosis of Graves' disease.  She started methimazole 30 mg daily 8/31/2021, and this was decreased to 20 mg daily on 9/21.    She has not been feeling well this week.  She has significant insomnia and anxiety which have been present for about a week and are very bothersome.  She has tried multiple medications to help with sleep, including Benadryl and CBD.  However, these have not been very effective.  She endorses temperature swings - she did stop using her estradiol patch when she initially became ill with hyperthyroidism, and restarted this last night and that helped somewhat with symptoms.  She also notes more frequent stools and racing heartbeat for which she takes propranolol 1-2 times per day.  No tremor.    She is concerned about  her eyes.  She has swelling around both of her eyes.  She notes that a cooling mask has been helpful for this.  She endorses blurry vision and some double vision when she gets up in the morning this week. She has noticed if she sleeps on her side one eye will be more swollen and this may cause double vision.  She has an upcoming appointment with ophthalmology.    ENDO THYROID LABS-UMP TSH T3 FREE T4   Latest Ref Rng & Units 0.40 - 4.00 mU/L 60 - 181 ng/dL 0.76 - 1.46 ng/dL   2021 <0.01 (L) 103 0.66 (L)   2021 <0.01 (L) 414 (H) 3.12 (H)   2021 <0.01 (L) 336 (H) 4.03 (H)   6/15/2015 0.89     2010 1.08  1.10       Current Outpatient Medications   Medication Instructions     Artificial Tear Solution (JUST TEARS EYE DROPS) SOLN 1 drop, Both Eyes, 2 TIMES DAILY     calcium carbonate-vitamin D 500-400 MG-UNIT TABS tablt 1 tablet, Oral, DAILY, 500 mg     escitalopram (LEXAPRO) 20 mg, Oral, DAILY     estradiol (CLIMARA) 0.05 MG/24HR weekly patch APPLY 1 PATCH TOPICALLY TO  SKIN ONCE A WEEK     methimazole (TAPAZOLE) 30 mg, Oral, DAILY     omeprazole (PRILOSEC) 20 mg, Oral     prochlorperazine (COMPAZINE) 10 mg, Oral, EVERY 8 HOURS PRN     propranolol (INDERAL) 10 mg, Oral, 3 TIMES DAILY     sucralfate (CARAFATE) 1 g, Oral, 2 TIMES DAILY PRN     valACYclovir (VALTREX) 500 MG tablet TAKE 1 TABLET BY MOUTH 2  TIMES DAILY FOR 3 DAY PER  OUTBREAK       Social History     Tobacco Use     Smoking status: Former Smoker     Packs/day: 0.50     Years: 20.00     Pack years: 10.00     Types: Cigarettes     Quit date: 2003     Years since quittin.6     Smokeless tobacco: Never Used   Substance Use Topics     Alcohol use: Yes     Alcohol/week: 0.0 standard drinks     Comment: 7 drinks week       Review of Systems: A comprehensive ROS was negative except as noted in HPI.     Physical Examination:  Estimated body mass index is 20.95 kg/m  as calculated from the following:    Height as of 21: 1.727 m (5'  "8\").    Weight as of 8/27/21: 62.5 kg (137 lb 12.6 oz).    Wt Readings from Last 4 Encounters:   08/27/21 62.5 kg (137 lb 12.6 oz)   08/20/21 64.4 kg (142 lb)   08/16/21 66 kg (145 lb 8 oz)   08/13/21 65.7 kg (144 lb 12.8 oz)     GENERAL: Alert and no distress  EYES: Periorbital edema. Watery eyes.   RESP: No audible wheeze, cough, or visible cyanosis.  Breathing and speaking comfortably.   SKIN: Visible skin clear.   NEURO: Cranial nerves grossly intact.    PSYCH: Mentation appears normal, affect anxious, judgement and insight intact, normal speech.       Assessment and Plan:    Hyperthyroidism secondary to Graves  -Reduce methimazole to 10 mg daily  -Given rapid changes in hormone levels and significant symptoms she is experiencing, we made a plan to follow her thyroid hormone levels weekly until stabilized.  She already has an appointment for Monday and will keep this as well.    Anxiety and insomnia.  Recent symptoms are likely driven by hyperthyroidism and Graves' disease, perhaps with an underlying component of chronic anxiety.  -Referral to health pyschology    -Trial of a small dose of lorazepam at bedtime to help with anxiety and insomnia in the short-term.  We discussed today that this would not be a long-term solution, but hopefully will help until we get her thyroid hormone levels more stable.    Thyroid eye disease.  Rapid changes in thyroid hormone levels may have contributed to increased periorbital edema.   -Advised to use lubricating eye drops  -Quit smoking 2 months ago   -Could try a selenium supplement to see if this helps with the symptoms  -Appointment in [facility] already scheduled for next month    Recent iodine-contrast load for CTAP, now 6 weeks ago (8/13/2021).     Follow up in clinic in 2 months.       50 minutes spent on the date of the encounter doing chart review, history and exam, documentation and further activities per the note      Video-Visit Details    Type of service:  Video " Visit    Video Start Time: 2:36 PM    Video End Time: 3:10 PM    Platform used for Video Visit: Sarita Yousif MD   Diabetes and Endocrinology   [phone]

## 2021-09-27 ENCOUNTER — LAB (OUTPATIENT)
Dept: LAB | Facility: CLINIC | Age: 56
End: 2021-09-27

## 2021-09-27 ENCOUNTER — OFFICE VISIT (OUTPATIENT)
Dept: OPHTHALMOLOGY | Facility: CLINIC | Age: 56
End: 2021-09-27
Attending: OPHTHALMOLOGY
Payer: COMMERCIAL

## 2021-09-27 DIAGNOSIS — E05.00 GRAVES DISEASE: ICD-10-CM

## 2021-09-27 DIAGNOSIS — E05.00 GRAVES DISEASE: Primary | ICD-10-CM

## 2021-09-27 DIAGNOSIS — Z86.69 HISTORY OF RETINAL DETACHMENT: ICD-10-CM

## 2021-09-27 DIAGNOSIS — E05.90 HYPERTHYROIDISM: ICD-10-CM

## 2021-09-27 LAB — T3 SERPL-MCNC: 96 NG/DL (ref 60–181)

## 2021-09-27 PROCEDURE — 92134 CPTRZ OPH DX IMG PST SGM RTA: CPT | Mod: 26 | Performed by: OPHTHALMOLOGY

## 2021-09-27 PROCEDURE — 36415 COLL VENOUS BLD VENIPUNCTURE: CPT

## 2021-09-27 PROCEDURE — 84443 ASSAY THYROID STIM HORMONE: CPT

## 2021-09-27 PROCEDURE — 92250 FUNDUS PHOTOGRAPHY W/I&R: CPT | Performed by: OPHTHALMOLOGY

## 2021-09-27 PROCEDURE — G0463 HOSPITAL OUTPT CLINIC VISIT: HCPCS

## 2021-09-27 PROCEDURE — 92134 CPTRZ OPH DX IMG PST SGM RTA: CPT | Performed by: OPHTHALMOLOGY

## 2021-09-27 PROCEDURE — 99204 OFFICE O/P NEW MOD 45 MIN: CPT | Mod: 25 | Performed by: OPHTHALMOLOGY

## 2021-09-27 PROCEDURE — 84439 ASSAY OF FREE THYROXINE: CPT

## 2021-09-27 PROCEDURE — 99207 PR BUNDLED PROCEDURE IN GLOBAL PKG: CPT | Mod: GC | Performed by: OPHTHALMOLOGY

## 2021-09-27 PROCEDURE — 84480 ASSAY TRIIODOTHYRONINE (T3): CPT

## 2021-09-27 ASSESSMENT — EXTERNAL EXAM - LEFT EYE: OS_EXAM: NORMAL

## 2021-09-27 ASSESSMENT — REFRACTION_WEARINGRX
OD_SPHERE: -6.75
SPECS_TYPE: PAL
OS_AXIS: 071
OS_CYLINDER: +1.75
OD_CYLINDER: +2.00
OS_SPHERE: -5.75
OS_ADD: +1.00
OD_ADD: +1.00
OD_AXIS: 098

## 2021-09-27 ASSESSMENT — CONF VISUAL FIELD
OD_INFERIOR_NASAL_RESTRICTION: 3
OS_NORMAL: 1
METHOD: COUNTING FINGERS

## 2021-09-27 ASSESSMENT — CUP TO DISC RATIO
OS_RATIO: 0.00
OD_RATIO: 0.00

## 2021-09-27 ASSESSMENT — VISUAL ACUITY
OD_PH_CC: 20/20
OD_CC+: -2+1
METHOD: SNELLEN - LINEAR
CORRECTION_TYPE: GLASSES
OS_CC: 20/30
OS_PH_CC: 20/25
OD_PH_CC+: -1
OD_CC: 20/30

## 2021-09-27 ASSESSMENT — TONOMETRY
OS_IOP_MMHG: 15
OD_IOP_MMHG: 14
IOP_METHOD: ICARE

## 2021-09-27 ASSESSMENT — EXTERNAL EXAM - RIGHT EYE: OD_EXAM: NORMAL

## 2021-09-27 ASSESSMENT — SLIT LAMP EXAM - LIDS
COMMENTS: NORMAL
COMMENTS: NORMAL

## 2021-09-27 NOTE — NURSING NOTE
Chief Complaints and History of Present Illnesses   Patient presents with     Consult For     Chief Complaint(s) and History of Present Illness(es)     Consult For     Laterality: right eye    Onset: sudden    Onset: 1 day ago    Course: gradually worsening    Associated symptoms: tearing, floaters, swelling, double vision and photophobia.  Negative for eye pain, dryness, flashes and trauma    Treatments tried: no treatments    Pain scale: 0/10              Comments     New Pt here for RE VF cut consult.  Hx of Graves, RD in RE, RE scleral buckle.  Loss of peripheral vision RE temporally this am.  No change to floaters.  Puffiness of LE>RE.  Treats with cold compress.  Woke up with double vision 2x last week.  No ocular medications.    DWIGHT Castillo September 27, 2021 12:36 PM

## 2021-09-27 NOTE — PROGRESS NOTES
I have confirmed the patient's and reviewed Past Medical History, Past Surgical History, Social History, Family History, Problem List, Medication List and agree with Tech note.    CC -   Blurry vision both eyes     INTERVAL HISTORY - Initial visit    ROSA -   Kaylyn Fischer is a  56 year old year-old patient presenting for new complaint of right eye peripheral vision blurring since earlier today. She denies flashes of lights or dark curtain sensation. She states her symptoms wax and lavon.     She was recently diagnosed with Graves disease by her Dr Shiloh Yousif and has an appointment with Dr Browning coming up in 10/18/21.     She has some pain with eye movement.    Does not smoke.     PAST OCULAR SURGERY  OD SBP 1992     RETINAL IMAGING:  OCT 9/27/2021   OD - normal contour   OS - normal contour    Fundus photo as described    OCT RNFL 9/27/2021   Reliable, full. Thinning left eye consistent with nerve appearance       ASSESSMENT & PLAN      1.  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    2.  History of retina detachment right eye  - with SBP and laser reaction   - stable, monitor    return to clinic: Retina clinic as needed      Christiano Gutierrez MD  Vitreoretinal Surgery Fellow  [facility]      Attestation:  I have seen and examined the patient with Dr. Christiano Gutierrez MD and agree with the findings in this note, as well as the interpretations of the diagnostic tests.      Berenice Johnson MD PhD.  Professor & Chair

## 2021-09-28 ENCOUNTER — ALLIED HEALTH/NURSE VISIT (OUTPATIENT)
Dept: OPHTHALMOLOGY | Facility: CLINIC | Age: 56
End: 2021-09-28
Attending: OPHTHALMOLOGY
Payer: COMMERCIAL

## 2021-09-28 DIAGNOSIS — E05.00 GRAVES DISEASE: Primary | ICD-10-CM

## 2021-09-28 LAB
T4 FREE SERPL-MCNC: 0.57 NG/DL (ref 0.76–1.46)
TSH SERPL DL<=0.005 MIU/L-ACNC: 0.55 MU/L (ref 0.4–4)

## 2021-09-28 PROCEDURE — 99207 PR NO CHARGE COORDINATED CARE PS: CPT | Performed by: OPHTHALMOLOGY

## 2021-09-28 PROCEDURE — 92015 DETERMINE REFRACTIVE STATE: CPT

## 2021-09-28 ASSESSMENT — REFRACTION_WEARINGRX
OS_ADD: +1.00
OD_SPHERE: -6.75
OS_CYLINDER: +1.75
OS_AXIS: 071
SPECS_TYPE: PAL
OD_CYLINDER: +2.00
OD_ADD: +1.00
OS_SPHERE: -5.75
OD_AXIS: 098

## 2021-09-28 ASSESSMENT — REFRACTION_MANIFEST
OS_ADD: +2.00
OD_AXIS: 112
OS_SPHERE: -5.00
OD_SPHERE: -6.75
OD_ADD: +2.00
OS_CYLINDER: +1.00
OS_AXIS: 062
OD_CYLINDER: +2.00

## 2021-09-28 NOTE — Clinical Note
Gray Swain - I did a refraction for this patient of yours yesterday.  We were able to get good vision but it was a difficult MR, see notes. - Loreta OLEA

## 2021-09-29 RX ORDER — METHIMAZOLE 10 MG/1
10 TABLET ORAL DAILY
Qty: 90 TABLET | Refills: 3
Start: 2021-09-29 | End: 2021-11-04

## 2021-09-29 ASSESSMENT — VISUAL ACUITY
OD_CC: 20/30-2
OS_CC: 20/30
CORRECTION_TYPE: GLASSES
METHOD: SNELLEN - LINEAR
OD_PH_CC: 20/20-
OS_PH_CC: 20/25

## 2021-09-29 NOTE — PROGRESS NOTES
Very difficult refraction, left eye more than right eye  Had to instill artificial tears multiple times throughout refraction.  Kaylyn stated her eyes were feeling dry that day, when they are normally excessively watery. Variable vision multifactorial due to large hazy floaters and dry eye.    Recommended that she increase PFAT use throughout the day, gave some recommendations on particular ones as well as  coupons.  Recommended ophthalmic gel or ointment for night as she has swelling due to thyroid and may have mild K exposure while sleeping.      Pt will try these things, fill glasses rx and let us know if any other concerns or questions.  Thyroid levels are changing due to meds and swelling has been going down.  I did instruct her to ask the optical shop how long she has for doctor remake in case her rx changes due to decrease in swelling over the next 60-90 days.      She understood and will call with questions.      Loreta Suarez, COT 5:21 PM 09/29/2021

## 2021-09-30 ENCOUNTER — MYC MEDICAL ADVICE (OUTPATIENT)
Dept: FAMILY MEDICINE | Facility: CLINIC | Age: 56
End: 2021-09-30

## 2021-09-30 DIAGNOSIS — F41.9 ANXIETY: Primary | ICD-10-CM

## 2021-10-01 ENCOUNTER — OFFICE VISIT (OUTPATIENT)
Dept: FAMILY MEDICINE | Facility: CLINIC | Age: 56
End: 2021-10-01
Payer: COMMERCIAL

## 2021-10-01 VITALS
RESPIRATION RATE: 13 BRPM | TEMPERATURE: 97.2 F | HEART RATE: 88 BPM | SYSTOLIC BLOOD PRESSURE: 101 MMHG | BODY MASS INDEX: 20.87 KG/M2 | OXYGEN SATURATION: 97 % | DIASTOLIC BLOOD PRESSURE: 70 MMHG | WEIGHT: 137.25 LBS

## 2021-10-01 DIAGNOSIS — F41.9 ANXIETY: ICD-10-CM

## 2021-10-01 DIAGNOSIS — G47.00 INSOMNIA, UNSPECIFIED TYPE: ICD-10-CM

## 2021-10-01 DIAGNOSIS — E05.00 GRAVES DISEASE: Primary | ICD-10-CM

## 2021-10-01 RX ORDER — ZOLPIDEM TARTRATE 5 MG/1
5 TABLET ORAL
Qty: 30 TABLET | Refills: 0 | Status: SHIPPED | OUTPATIENT
Start: 2021-10-01 | End: 2021-10-01

## 2021-10-01 RX ORDER — ZOLPIDEM TARTRATE 5 MG/1
5 TABLET ORAL
Qty: 30 TABLET | Refills: 0 | Status: SHIPPED | OUTPATIENT
Start: 2021-10-01 | End: 2021-11-15

## 2021-10-01 NOTE — NURSING NOTE
56 year old  Chief Complaint   Patient presents with     Insomnia     x 3 weeks problems fall and staying asleep averg. 3-4 hrs per night       Blood pressure 101/70, pulse 88, temperature 97.2  F (36.2  C), temperature source Skin, resp. rate 13, weight 62.3 kg (137 lb 4 oz), last menstrual period 2016, SpO2 97 %, not currently breastfeeding. Body mass index is 20.87 kg/m .  Patient Active Problem List   Diagnosis     CARDIOVASCULAR SCREENING; LDL GOAL LESS THAN 160     PMDD (premenstrual dysphoric disorder)     Herpes simplex vulvovaginitis     COPD (chronic obstructive pulmonary disease) (H)     Adhesive capsulitis of left shoulder     Graves disease       Wt Readings from Last 2 Encounters:   10/01/21 62.3 kg (137 lb 4 oz)   21 62.5 kg (137 lb 12.6 oz)     BP Readings from Last 3 Encounters:   10/01/21 101/70   21 102/67   21 122/79         Current Outpatient Medications   Medication     Artificial Tear Solution (JUST TEARS EYE DROPS) SOLN     estradiol (CLIMARA) 0.05 MG/24HR weekly patch     HEMP OIL OR EXTRACT OR OTHER CBD CANNABINOID, NOT MEDICAL CANNABIS,     LORazepam (ATIVAN) 0.5 MG tablet     methimazole (TAPAZOLE) 10 MG tablet     propranolol (INDERAL) 10 MG tablet     calcium carbonate-vitamin D 500-400 MG-UNIT TABS tablt     escitalopram (LEXAPRO) 20 MG tablet     omeprazole (PRILOSEC) 20 MG DR capsule     prochlorperazine (COMPAZINE) 10 MG tablet     valACYclovir (VALTREX) 500 MG tablet     No current facility-administered medications for this visit.       Social History     Tobacco Use     Smoking status: Former Smoker     Packs/day: 0.50     Years: 20.00     Pack years: 10.00     Types: Cigarettes     Quit date: 2003     Years since quittin.6     Smokeless tobacco: Never Used   Vaping Use     Vaping Use: Never used   Substance Use Topics     Alcohol use: Yes     Alcohol/week: 0.0 standard drinks     Comment: 7 drinks week     Drug use: No       Health Maintenance Due    Topic Date Due     PREVENTIVE CARE VISIT  Never done     SPIROMETRY  Never done     ADVANCE CARE PLANNING  Never done     COPD ACTION PLAN  Never done     LIPID  Never done     HPV TEST  04/20/2021     PAP  04/20/2021     INFLUENZA VACCINE (1) 09/01/2021       Lab Results   Component Value Date    PAP NIL 04/20/2018 October 1, 2021 1:13 PM

## 2021-10-01 NOTE — PROGRESS NOTES
SUBJECTIVE:   Kaylyn Fischer is a 56 year old female who presents to clinic today to discuss the following problem(s).    Anxiety/Insomnia  - history of recent, new onset, Graves disease  - currently working with endocrinology to address thyroid abnormalcy   - started on methimazole initially at 30mg daily since decreased to 5mg daily  - most recent thyroid labs on 9/27 show    - free T4 low at 0.57    - TSH normal at 0.55 (up from low of 0.26 on 9/24  - patient spoke to endocrine about sleep issues and was prescribed a short course of 0.5mg ativan  - patient reports last night she was unable to fall asleep and ended up taking a total of 2mg of ativan over the course of the night, with no significant improvement in symptoms  - previously tried benadryl 50mg, but this did not help her sleep  - patient is taking a CBD supplement in the evenings, she reports it is more helpful than the benadryl for alleviating eye pressure related to Graves  - patient does have an Rx for propranolol that she takes on a PRN basis when anxiety causes her to have a racing heart rate    - previous nausea from elevated thyroid levels has resolved  - denies any vision changes     Today's PHQ-2:  PHQ-2 ( 1999 Pfizer) 9/27/2021 8/16/2021   Q1: Little interest or pleasure in doing things 1 0   Q2: Feeling down, depressed or hopeless 1 0   PHQ-2 Score 2 0       Past Medical History:   Diagnosis Date     Adhesive capsulitis of right shoulder 4/2016 & 9/2016    cortisone injection & manipulation with anesthesia      Genital herpes     Valtrex     Genital warts     Aldara     PMDD (premenstrual dysphoric disorder) 2010    Lexapro     Past Surgical History:   Procedure Laterality Date     COLONOSCOPY N/A 1/29/2016     COLONOSCOPY WITH CO2 INSUFFLATION N/A 1/29/2016    NL, repeat 10 years      ESOPHAGOSCOPY, GASTROSCOPY, DUODENOSCOPY (EGD), COMBINED N/A 8/27/2021    Procedure: ESOPHAGOGASTRODUODENOSCOPY (EGD);  Surgeon: Parvin Guerra MD;   Location:  GI     RETINAL REATTACHMENT       [facility] NONSPECIFIC PROCEDURE  2016    manipulation left shoulder      Family History   Problem Relation Age of Onset     Heart Disease Sister         PVC, tx'd beta blocker     Hypertension Paternal Grandmother      Diabetes No family hx of      Glaucoma No family hx of      Macular Degeneration No family hx of      Social History     Tobacco Use     Smoking status: Former Smoker     Packs/day: 0.50     Years: 20.00     Pack years: 10.00     Types: Cigarettes     Quit date: 2003     Years since quittin.6     Smokeless tobacco: Never Used   Vaping Use     Vaping Use: Never used   Substance Use Topics     Alcohol use: Yes     Alcohol/week: 0.0 standard drinks     Comment: 7 drinks week     Drug use: No     Social History     Social History Narrative     Not on file       Current Outpatient Medications   Medication     Artificial Tear Solution (JUST TEARS EYE DROPS) SOLN     estradiol (CLIMARA) 0.05 MG/24HR weekly patch     HEMP OIL OR EXTRACT OR OTHER CBD CANNABINOID, NOT MEDICAL CANNABIS,     LORazepam (ATIVAN) 0.5 MG tablet     methimazole (TAPAZOLE) 10 MG tablet     propranolol (INDERAL) 10 MG tablet     No current facility-administered medications for this visit.     I have reviewed the patient's past medical, surgical, family, and social history.     OBJECTIVE:   /70 (BP Location: Right arm, Patient Position: Sitting, Cuff Size: Adult Regular)   Pulse 88   Temp 97.2  F (36.2  C) (Skin)   Resp 13   Wt 62.3 kg (137 lb 4 oz)   LMP 2016 (Exact Date)   SpO2 97%   BMI 20.87 kg/m      Constitutional: well-appearing, appears stated age  Eyes: moderate exophthalmos, conjunctivae without erythema, sclera anicteric.   Cardiac: regular rate and rhythm, normal S1/S2, no murmur/rubs/gallops  Respiratory: lungs clear to auscultation bilaterally, normal work of breathing, no wheezes/crackles  Skin: no rashes, lesions, or wounds  Psych: affect is full and  appropriate, emotional when speaking about her difficulties with sleep, speech is fluent and non-pressured    ASSESSMENT AND PLAN:     Kaylyn was seen today for insomnia.    Diagnoses and all orders for this visit:    Graves disease    Insomnia, unspecified type  -     Discontinue: zolpidem (AMBIEN) 5 MG tablet; Take 1 tablet (5 mg) by mouth nightly as needed for sleep  -     zolpidem (AMBIEN) 5 MG tablet; Take 1 tablet (5 mg) by mouth nightly as needed for sleep    Anxiety  -     Discontinue: zolpidem (AMBIEN) 5 MG tablet; Take 1 tablet (5 mg) by mouth nightly as needed for sleep  -     zolpidem (AMBIEN) 5 MG tablet; Take 1 tablet (5 mg) by mouth nightly as needed for sleep    Lengthy discussion about possible medications to help maintain sleep for short periods of time. Concern given patient's recent episode of insomnia despite taking 2mg of Ativan over the course of one night. I do suspect this is an acute phase related to ongoing thyroid issues. I have agreed to a short course of Ambien as noted above. Cautioned Kaylyn against taking this medication in conjunction with benzodiazepines, alcohol or her propranolol. Encouraged close follow up with me at least via MyChart to monitor for safety and efficacy. If patient continues to report intolerable difficulty with sleep I would next consider a referral to sleep medicine for further assessment.       Alberto Gold MD  [facility]  10/01/2021, 1:21 PM

## 2021-10-04 ENCOUNTER — LAB (OUTPATIENT)
Dept: LAB | Facility: CLINIC | Age: 56
End: 2021-10-04
Payer: COMMERCIAL

## 2021-10-04 DIAGNOSIS — E05.90 HYPERTHYROIDISM: ICD-10-CM

## 2021-10-04 DIAGNOSIS — E05.00 GRAVES DISEASE: ICD-10-CM

## 2021-10-04 LAB
T3 SERPL-MCNC: 223 NG/DL (ref 60–181)
T4 FREE SERPL-MCNC: 1.46 NG/DL (ref 0.76–1.46)
TSH SERPL DL<=0.005 MIU/L-ACNC: <0.01 MU/L (ref 0.4–4)

## 2021-10-04 PROCEDURE — 84439 ASSAY OF FREE THYROXINE: CPT

## 2021-10-04 PROCEDURE — 84480 ASSAY TRIIODOTHYRONINE (T3): CPT

## 2021-10-04 PROCEDURE — 84443 ASSAY THYROID STIM HORMONE: CPT

## 2021-10-04 PROCEDURE — 36415 COLL VENOUS BLD VENIPUNCTURE: CPT

## 2021-10-04 NOTE — TELEPHONE ENCOUNTER
My chart correspondence. Referral place to mental health as noted below. See patient correspondence for details.    Diagnoses and all orders for this visit:    Anxiety  -     MENTAL HEALTH REFERRAL  - Adult; Psychiatry; Psychiatry; Collaborative Care Psychiatry Service/Bridge to Long-Term Psychiatry as indicated ([phone]); Yes; Several Medications tried and failed; Yes; We will contact you to schedule the appoin...      Alberto Gold MD  6:02 PM, October 4, 2021

## 2021-10-06 ENCOUNTER — PRE VISIT (OUTPATIENT)
Dept: ENDOCRINOLOGY | Facility: CLINIC | Age: 56
End: 2021-10-06

## 2021-10-11 ENCOUNTER — LAB (OUTPATIENT)
Dept: LAB | Facility: CLINIC | Age: 56
End: 2021-10-11
Payer: COMMERCIAL

## 2021-10-11 DIAGNOSIS — E05.00 GRAVES DISEASE: ICD-10-CM

## 2021-10-11 DIAGNOSIS — E05.90 HYPERTHYROIDISM: ICD-10-CM

## 2021-10-11 LAB
T3 SERPL-MCNC: 146 NG/DL (ref 60–181)
T4 FREE SERPL-MCNC: 1.41 NG/DL (ref 0.76–1.46)
TSH SERPL DL<=0.005 MIU/L-ACNC: <0.01 MU/L (ref 0.4–4)

## 2021-10-11 PROCEDURE — 84439 ASSAY OF FREE THYROXINE: CPT | Performed by: INTERNAL MEDICINE

## 2021-10-11 PROCEDURE — 84480 ASSAY TRIIODOTHYRONINE (T3): CPT | Performed by: INTERNAL MEDICINE

## 2021-10-11 PROCEDURE — 84443 ASSAY THYROID STIM HORMONE: CPT | Performed by: INTERNAL MEDICINE

## 2021-10-15 ENCOUNTER — TELEPHONE (OUTPATIENT)
Dept: OPHTHALMOLOGY | Facility: CLINIC | Age: 56
End: 2021-10-15

## 2021-10-18 ENCOUNTER — OFFICE VISIT (OUTPATIENT)
Dept: OPHTHALMOLOGY | Facility: CLINIC | Age: 56
End: 2021-10-18
Attending: OPHTHALMOLOGY
Payer: COMMERCIAL

## 2021-10-18 DIAGNOSIS — H57.89 THYROID EYE DISEASE: Primary | ICD-10-CM

## 2021-10-18 DIAGNOSIS — E07.9 THYROID EYE DISEASE: Primary | ICD-10-CM

## 2021-10-18 PROCEDURE — V2718 FRESNELL PRISM PRESS-ON LENS: HCPCS | Performed by: OPHTHALMOLOGY

## 2021-10-18 PROCEDURE — 92285 EXTERNAL OCULAR PHOTOGRAPHY: CPT | Performed by: OPHTHALMOLOGY

## 2021-10-18 PROCEDURE — G0463 HOSPITAL OUTPT CLINIC VISIT: HCPCS | Mod: 25 | Performed by: TECHNICIAN/TECHNOLOGIST

## 2021-10-18 PROCEDURE — 99214 OFFICE O/P EST MOD 30 MIN: CPT | Performed by: OPHTHALMOLOGY

## 2021-10-18 RX ORDER — NALOXONE HYDROCHLORIDE 0.4 MG/ML
0.2 INJECTION, SOLUTION INTRAMUSCULAR; INTRAVENOUS; SUBCUTANEOUS
Status: CANCELLED | OUTPATIENT
Start: 2021-10-18

## 2021-10-18 RX ORDER — METHYLPREDNISOLONE SODIUM SUCCINATE 125 MG/2ML
125 INJECTION, POWDER, LYOPHILIZED, FOR SOLUTION INTRAMUSCULAR; INTRAVENOUS
Status: CANCELLED
Start: 2021-10-18

## 2021-10-18 RX ORDER — ALBUTEROL SULFATE 0.83 MG/ML
2.5 SOLUTION RESPIRATORY (INHALATION)
Status: CANCELLED | OUTPATIENT
Start: 2021-10-18

## 2021-10-18 RX ORDER — EPINEPHRINE 1 MG/ML
0.3 INJECTION, SOLUTION, CONCENTRATE INTRAVENOUS EVERY 5 MIN PRN
Status: CANCELLED | OUTPATIENT
Start: 2021-10-18

## 2021-10-18 RX ORDER — ALBUTEROL SULFATE 90 UG/1
1-2 AEROSOL, METERED RESPIRATORY (INHALATION)
Status: CANCELLED
Start: 2021-10-18

## 2021-10-18 RX ORDER — HEPARIN SODIUM,PORCINE 10 UNIT/ML
5 VIAL (ML) INTRAVENOUS
Status: CANCELLED | OUTPATIENT
Start: 2021-10-18

## 2021-10-18 RX ORDER — ACETAMINOPHEN 325 MG/1
650 TABLET ORAL ONCE
Status: CANCELLED | OUTPATIENT
Start: 2021-10-18 | End: 2021-10-18

## 2021-10-18 RX ORDER — MEPERIDINE HYDROCHLORIDE 25 MG/ML
25 INJECTION INTRAMUSCULAR; INTRAVENOUS; SUBCUTANEOUS EVERY 30 MIN PRN
Status: CANCELLED | OUTPATIENT
Start: 2021-10-18

## 2021-10-18 RX ORDER — DIPHENHYDRAMINE HCL 25 MG
50 CAPSULE ORAL ONCE
Status: CANCELLED | OUTPATIENT
Start: 2021-10-18 | End: 2021-10-18

## 2021-10-18 RX ORDER — DIPHENHYDRAMINE HYDROCHLORIDE 50 MG/ML
50 INJECTION INTRAMUSCULAR; INTRAVENOUS
Status: CANCELLED
Start: 2021-10-18

## 2021-10-18 RX ORDER — HEPARIN SODIUM (PORCINE) LOCK FLUSH IV SOLN 100 UNIT/ML 100 UNIT/ML
5 SOLUTION INTRAVENOUS
Status: CANCELLED | OUTPATIENT
Start: 2021-10-18

## 2021-10-18 RX ORDER — METHYLPREDNISOLONE SODIUM SUCCINATE 125 MG/2ML
125 INJECTION, POWDER, LYOPHILIZED, FOR SOLUTION INTRAMUSCULAR; INTRAVENOUS ONCE
Status: CANCELLED | OUTPATIENT
Start: 2021-10-18 | End: 2021-10-18

## 2021-10-18 ASSESSMENT — REFRACTION_WEARINGRX
OD_CYLINDER: +2.25
OD_AXIS: 100
OS_CYLINDER: +1.50
OS_ADD: +2.25
OD_SPHERE: -6.75
OD_SPHERE: -7.00
OD_CYLINDER: +2.00
OS_SPHERE: -5.00
OS_ADD: +1.00
OS_CYLINDER: +1.50
OS_AXIS: 060
OS_AXIS: 075
OD_ADD: +2.25
OS_SPHERE: -5.75
OD_AXIS: 110
OD_ADD: +1.00
SPECS_TYPE: PAL

## 2021-10-18 ASSESSMENT — TONOMETRY
OD_IOP_MMHG: 20
OS_IOP_MMHG: 15
IOP_METHOD: SINGLE ICARE
OD_IOP_MMHG: 15
IOP_METHOD: SINGLE ICARE
OS_IOP_MMHG: 23

## 2021-10-18 ASSESSMENT — MARGIN REFLEX DISTANCE
OD_MRD2: 4
OS_MRD1: 8
OS_MRD2: 5
OD_MRD1: 7

## 2021-10-18 ASSESSMENT — SLIT LAMP EXAM - LIDS
COMMENTS: LID RETRACTION
COMMENTS: LID RETRACTION

## 2021-10-18 ASSESSMENT — VISUAL ACUITY
OS_CC+: -3
CORRECTION_TYPE: GLASSES
OD_CC: 20/20
OS_CC: 20/20
METHOD: SNELLEN - LINEAR

## 2021-10-18 ASSESSMENT — CUP TO DISC RATIO
OD_RATIO: 0.00
OS_RATIO: 0.00

## 2021-10-18 ASSESSMENT — LAGOPHTHALMOS
OS_LAGOPHTHALMOS: 1
OD_LAGOPHTHALMOS: 0

## 2021-10-18 ASSESSMENT — EXTERNAL EXAM - RIGHT EYE: OD_EXAM: MINIMAL PROPTOSIS

## 2021-10-18 ASSESSMENT — EXTERNAL EXAM - LEFT EYE: OS_EXAM: PROPTOSIS

## 2021-10-18 NOTE — Clinical Note
10/18/2021       RE: Kaylyn Fischer  [address] [place] [address]     Dear Colleague,    Thank you for referring your patient, Kaylyn Fischer, to the [facility] at [facility]. Please see a copy of my visit note below.           Assessment & Plan     HPI: History of Graves diagnosed at the end of August. Had been symptomatic with nausea     Eye symptoms started in Mid August with red, swollen eyelids with protrusion of the left eye. Movement issues mid to late August with difficulty reading. No issues at distance, though also reports that moving eyes laterally when driving has been difficult.     Previously had significant tearing and redness that markedly improved with treatment. She is using viscous drops at bed time though doesn't report any history of dryness.    Methimazole 10 mg daily - started on 30 mg daily and this has been reduced. Propranolol 10 mg daily.     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 Gold PCP    Thyroid history:  Diagnosed when? 3.3  GOLDEN: N/A  Thyroidectomy: N/A    TSI (date):  8/31/21 3.3  Thyroglobulin 8/15/21 WNL  Last T3 total/T4 free  10/11/21 146/1.41 TSH <0.01    Outside notes:  Endocrine (9/24/21)  Hyperthyroidism secondary to Graves  -Reduce methimazole to 10 mg daily  -Given rapid changes in hormone levels and significant symptoms she is experiencing, we made a plan to follow her thyroid hormone levels weekly until stabilized.  She already has an appointment for Monday and will keep this as well.     Anxiety and insomnia.  Recent symptoms are likely driven by hyperthyroidism and Graves' disease, perhaps with an underlying component of chronic anxiety.  -Referral to health pyschology    -Trial of a small dose of lorazepam at bedtime to help with anxiety and insomnia in the  "short-term.  We discussed today that this would not be a long-term solution, but hopefully will help until we get her thyroid hormone levels more stable.     Thyroid eye disease.  Rapid changes in thyroid hormone levels may have contributed to increased periorbital edema.   -Advised to use lubricating eye drops  -Quit smoking 2 months ago   -Could try a selenium supplement to see if this helps with the symptoms  -Appointment in [facility] clinic already scheduled for next month    Family history of: MS in mother's  maternal grandmother paralyzed from waist down. Mother functionally blind and  at 58 from \"loss of brain tissue\"    Eye symptoms (since when):   Prior      Proptosis (better/worse/same since last visit): initial, present   Diplopia(better/worse/same since last visit): initial, present  Eyelid retraction(better/worse/same since last visit): initial, present  Tearing(better/worse/same since last visit): no  Redness (better/worse/same since last visit): initial, present  Pain ((better/worse/same since last visit): present  Pain to move the eyes (better/worse/same since last visit): present  Blurred vision: with dry eyes    Ocular history:   Orbital decompression (date, details): N/A  Strabismus surgery (date, details): N/A  Eyelid surgery (date, details): N/A    Exam:   Danny (base):99 22/24    Better/worse same:   Strabismus (better/worse/same): intial. Mild X 2 PD. Likes 2 BI prism.  Eyelid retraction (better/worse/same): present MRD 1 R/L:  7/8  MRD 2 R/L: 4/5    ELIDA Score:  1. Spontaneous orbital pain.     no  2. Gaze evoked orbital pain.     yes  3. Eyelid swelling due to active thyroid eye disease  yes  4. Eyelid erythema.      yes  5. Conjunctival redness due to active thyroid eye disease yes  6. Chemosis.        yes  7. Inflammation of caruncle OR plica.   yes    ELIDA SCORE = 6/7    Kaylyn Fischer is a 56 year old female with the following diagnoses:   1. Thyroid eye disease       Thyroid eye disease " (RAMAN).  The natural history of thyroid eye disease was discussed. We told the patient that typically RAMAN will worsen for a period of time, then improve to some degree, and then stabilize without normalizing.  This process can take somewhere between 1 and 3 years on average.  In the meantime, we recommended seeing the patient in the [facility] every 6 months (sooner if the patient experiences worsening vision in either eye).  Once the patient becomes stable for at least 6 months' time, we discussed that the patient may need restorative surgery or prisms.  Finally, we discussed that correcting the thyroid hormone levels does not ensure that the eyes will normalize but that it could help to some degree.       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    I discussed the use of teprotumumab for the treatment of thyroid eye disease. We discussed the common side effects including muscle cramping, nausea, diarrhea and headache. Less common side effects including change in blood sugar, alopecia and hearing changes were also discussed with the patient. We discussed alternative options including off-label use of medications which are not FDA approved for thyroid eye disease including pulse IV corticosteroids and alternative immuno-suppressive agents (e.g. rituximab and tocilizumab).  Ultimately we opted against rituximab and tocilizumab because their efficacy is not well proven for thyroid eye disease.  These drugs have never been studied in a randomized control trial for this indication (literature is limited to retrospective case reports and case series) and they both carry a risk of severe side-effects including potential progressive multifocal leukoencephalopathy.  We discussed corticosteroids as this can have some effect on the inflammatory component of thyroid eye disease but is not FDA approved for this use, does carry some risk for serious side-effects, and has not been shown  to conclusively change the long-term outcome of the disease in quality, prospective randomized trials.     After a detailed conversation the patient and I have opted to proceed with initiation of teprotumumab treatment. The patient enrollment form will be filled out and faxed to [facility] and initiation of the treatment authorization process through [place].      Weight: 139  Hyperglycemia: No   Inflammatory Bowel Disease: No  Pregnancy/Birth Control:  N/A  ELIDA score:  6/7  Eyelid retraction (Y/N)- see MRD1 measurements in exam: yes  Diplopia? (Y/N): no      Transient, inconstant, or constant? N/A  Proptosis? (Y/N) see Danny measurements in exam: Yes  Is thyroid eye disease active and progressive? (Y/N): Yes  Is patient followed by endocrinology or a primary care provider and working to optimize thyroid function with thyroid function monitoring? (Y/N): yes, last seen 9/24/21    Prior corticosteroids? No            Attending Physician Attestation:  Complete documentation of historical and exam elements from today's encounter can be found in the full encounter summary report (not reduplicated in this progress note).  I personally obtained the chief complaint(s) and history of present illness.  I confirmed and edited as necessary the review of systems, past medical/surgical history, family history, social history, and examination findings as documented by others; and I examined the patient myself.  I personally reviewed the relevant tests, images, and reports as documented above.  I formulated and edited as necessary the assessment and plan and discussed the findings and management plan with the patient and family. I personally reviewed the ophthalmic test(s) associated with this encounter, agree with the interpretation(s) as documented by the resident/fellow, and have edited the corresponding report(s) as necessary.  - Raghav Hurt, DO   PGY-5 Neuro-Ophthalmology Fellow          Again, thank you  for allowing me to participate in the care of your patient.      Sincerely,    Raghav Browning MD

## 2021-10-18 NOTE — NURSING NOTE
Chief Complaint(s) and History of Present Illness(es)     Thyroid Disease     Laterality: both eyes    Associated symptoms: photophobia, eye pain, tearing and swelling.  Negative for double vision and discharge    Treatments tried: glasses, eye drops and artificial tears    Comments: Dx end of August 2021, previously had eye pain with movement, was unable to read/drive because of pain in August/Sept., still has difficulty reading, distance VA stable, lid swelling, no diplopia, using AT prn daily and gel for night               Comments     H/o retinal detachment RE     10/11/2021  T3 Total 60 - 181 ng/dL 146    Free T4 0.76 - 1.46 ng/dL 1.41    TSH 0.40 - 4.00 mU/L <0.01     Audie Arellano MD - 08/20/2021   Formatting of this note might be different from the original.   EXAM: MRI OF THE BRAIN WITHOUT CONTRAST 8/20/2021 4:37 PM.     CLINICAL INFORMATION: Other-Document in comments below  R27.0 Ataxia, unspecified     COMPARISON: None.     TECHNIQUE: Sagittal T1, axial DWI, axial and coronal FLAIR, axial T2, axial MPGR.     FINDINGS:     BRAIN PARENCHYMA: There is no abnormal intracranial mass lesion, or recent hemorrhage. No restricted diffusion or other evidence of acute infarct.         VENTRICLES/BRAIN VOLUME: Normal for age.     WHITE MATTER:   White matter structures appear normal.     EXTRA-AXIAL SPACES: No hemorrhage, fluid accumulation, or tumor.     DEEP GRAY NUCLEI: No acute abnormality.     POSTERIOR FOSSA:   Normal cerebellum and brain stem. No acute abnormality.     VASCULAR STRUCTURES: Expected flow voids are identified in the major intracranial vascular structures.     CALVARIUM: Unremarkable.     SINUSES AND MASTOIDS: Minimal inflammatory mucosal thickening ethmoid air cells.     IMPRESSION   IMPRESSION:     No significant intracranial abnormality evident.

## 2021-10-18 NOTE — LETTER
10/18/2021         RE:  :  MRN: Kaylyn Fischer  1965  [id number]     Dear Dr. Yousif:     Thank you for asking me to see your very pleasant patient, Kaylyn Fischer, in neuro-ophthalmic consultation.  I would like to thank you for sending your records and I have summarized them in the history of present illness.  My assessment and plan are below.  For further details, please see my attached clinic note.      Assessment & Plan   HPI: History of Graves diagnosed at the end of August. Had been symptomatic with nausea     Eye symptoms started in Mid August with red, swollen eyelids with protrusion of the left eye. Movement issues mid to late August with difficulty reading. No issues at distance, though also reports that moving eyes laterally when driving has been difficult.     Previously had significant tearing and redness that markedly improved with treatment. She is using viscous drops at bed time though doesn't report any history of dryness.    Methimazole 10 mg daily - started on 30 mg daily and this has been reduced. Propranolol 10 mg daily.     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 Gold PCP    Thyroid history:    Diagnosed when? 3.3  GOLDEN: N/A  Thyroidectomy: N/A  TSI (date):  21 3.3  Thyroglobulin 8/15/21 WNL  Last T3 total/T4 free  10/11/21 146/1.41 TSH <0.01    Outside notes:    Endocrine (21)  Hyperthyroidism secondary to Graves  -Reduce methimazole to 10 mg daily  -Given rapid changes in hormone levels and significant symptoms she is experiencing, we made a plan to follow her thyroid hormone levels weekly until stabilized.  She already has an appointment for Monday and will keep this as well.     Anxiety and insomnia.  Recent symptoms are likely driven by hyperthyroidism and Graves' disease, perhaps with an underlying component of chronic anxiety.  -Referral to health pyschology  "   -Trial of a small dose of lorazepam at bedtime to help with anxiety and insomnia in the short-term.  We discussed today that this would not be a long-term solution, but hopefully will help until we get her thyroid hormone levels more stable.     Thyroid eye disease.  Rapid changes in thyroid hormone levels may have contributed to increased periorbital edema.   -Advised to use lubricating eye drops  -Quit smoking 2 months ago   -Could try a selenium supplement to see if this helps with the symptoms  -Appointment in [facility] clinic already scheduled for next month    Family history of: MS in mother's  maternal grandmother paralyzed from waist down. Mother functionally blind and  at 58 from \"loss of brain tissue\"    Eye symptoms (since when):  Prior      Proptosis (better/worse/same since last visit): initial, present   Diplopia(better/worse/same since last visit): initial, present  Eyelid retraction(better/worse/same since last visit): initial, present  Tearing(better/worse/same since last visit): no  Redness (better/worse/same since last visit): initial, present  Pain ((better/worse/same since last visit): present  Pain to move the eyes (better/worse/same since last visit): present  Blurred vision: with dry eyes    Ocular history:   Orbital decompression (date, details): N/A  Strabismus surgery (date, details): N/A  Eyelid surgery (date, details): N/A    Exam:   Danny (base):99 22/24    Better/worse same:   Strabismus (better/worse/same): intial. Mild X 2 PD. Likes 2 BI prism.  Eyelid retraction (better/worse/same): present MRD 1 R/L:  7/8  MRD 2 R/L: 4/5    ELIDA Score:  1. Spontaneous orbital pain.     no  2. Gaze evoked orbital pain.     yes  3. Eyelid swelling due to active thyroid eye disease  yes  4. Eyelid erythema.      yes  5. Conjunctival redness due to active thyroid eye disease yes  6. Chemosis.        yes  7. Inflammation of caruncle OR plica.   yes    ELIDA SCORE = 6/7    Kaylyn Fischer is a 56 year " old female with the following diagnoses:   1. Thyroid eye disease         Thyroid eye disease (RAMAN).  The natural history of thyroid eye disease was discussed. We told the patient that typically RAMAN will worsen for a period of time, then improve to some degree, and then stabilize without normalizing.  This process can take somewhere between 1 and 3 years on average.  In the meantime, we recommended seeing the patient in the [facility] every 6 months (sooner if the patient experiences worsening vision in either eye).  Once the patient becomes stable for at least 6 months' time, we discussed that the patient may need restorative surgery or prisms.  Finally, we discussed that correcting the thyroid hormone levels does not ensure that the eyes will normalize but that it could help to some degree.       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    I discussed the use of teprotumumab for the treatment of thyroid eye disease. We discussed the common side effects including muscle cramping, nausea, diarrhea and headache. Less common side effects including change in blood sugar, alopecia and hearing changes were also discussed with the patient. We discussed alternative options including off-label use of medications which are not FDA approved for thyroid eye disease including pulse IV corticosteroids and alternative immuno-suppressive agents (e.g. rituximab and tocilizumab).  Ultimately we opted against rituximab and tocilizumab because their efficacy is not well proven for thyroid eye disease.  These drugs have never been studied in a randomized control trial for this indication (literature is limited to retrospective case reports and case series) and they both carry a risk of severe side-effects including potential progressive multifocal leukoencephalopathy.  We discussed corticosteroids as this can have some effect on the inflammatory component of thyroid eye disease but is not FDA  approved for this use, does carry some risk for serious side-effects, and has not been shown to conclusively change the long-term outcome of the disease in quality, prospective randomized trials.     After a detailed conversation the patient and I have opted to proceed with initiation of teprotumumab treatment. The patient enrollment form will be filled out and faxed to [facility] and initiation of the treatment authorization process through [employer].      Weight: 139  Hyperglycemia: No   Inflammatory Bowel Disease: No  Pregnancy/Birth Control:  N/A  ELIDA score:  6/7  Eyelid retraction (Y/N)- see MRD1 measurements in exam: yes  Diplopia? (Y/N): no      Transient, inconstant, or constant? N/A  Proptosis? (Y/N) see Danny measurements in exam: Yes  Is thyroid eye disease active and progressive? (Y/N): Yes  Is patient followed by endocrinology or a primary care provider and working to optimize thyroid function with thyroid function monitoring? (Y/N): yes, last seen 9/24/21    Prior corticosteroids? No            Again, thank you for allowing me to participate in the care of your patient.      Sincerely,    Raghav Browning MD  Professor  Ophthalmology Residency   Director of Neuro-Ophthalmology  [facility] Endowed Chair  Departments of Ophthalmology, Neurology, and Neurosurgery  [place] [address]  [address] [place]  [address]  T - [phone]   - [phone]  [email or web address] [email or web address]      CC: Casandra Yousif MD  [facility]  [address] [facility] [address]  Via In Basket     Alberto Gold MD  [address] [address]  Via In Basket     Julisa Michel MD  [address] [facility] [address]  [facility] [address]  Via In Basket    DX = Thyroid eye disease, tepezza

## 2021-10-18 NOTE — PROGRESS NOTES
Assessment & Plan     HPI: History of Graves diagnosed at the end of August. Had been symptomatic with nausea     Eye symptoms started in Mid August with red, swollen eyelids with protrusion of the left eye. Movement issues mid to late August with difficulty reading. No issues at distance, though also reports that moving eyes laterally when driving has been difficult.     Previously had significant tearing and redness that markedly improved with treatment. She is using viscous drops at bed time though doesn't report any history of dryness.    Methimazole 10 mg daily - started on 30 mg daily and this has been reduced. Propranolol 10 mg daily.     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 Gold PCP    Thyroid history:  Diagnosed when? 3.3  GOLDEN: N/A  Thyroidectomy: N/A    TSI (date):  8/31/21 3.3  Thyroglobulin 8/15/21 WNL  Last T3 total/T4 free  10/11/21 146/1.41 TSH <0.01    Outside notes:  Endocrine (9/24/21)  Hyperthyroidism secondary to Graves  -Reduce methimazole to 10 mg daily  -Given rapid changes in hormone levels and significant symptoms she is experiencing, we made a plan to follow her thyroid hormone levels weekly until stabilized.  She already has an appointment for Monday and will keep this as well.     Anxiety and insomnia.  Recent symptoms are likely driven by hyperthyroidism and Graves' disease, perhaps with an underlying component of chronic anxiety.  -Referral to health pyschology    -Trial of a small dose of lorazepam at bedtime to help with anxiety and insomnia in the short-term.  We discussed today that this would not be a long-term solution, but hopefully will help until we get her thyroid hormone levels more stable.     Thyroid eye disease.  Rapid changes in thyroid hormone levels may have contributed to increased periorbital edema.   -Advised to use lubricating eye drops  -Quit  "smoking 2 months ago   -Could try a selenium supplement to see if this helps with the symptoms  -Appointment in [facility] already scheduled for next month    Family history of: MS in mother's  maternal grandmother paralyzed from waist down. Mother functionally blind and  at 58 from \"loss of brain tissue\"    Eye symptoms (since when):   Prior      Proptosis (better/worse/same since last visit): initial, present   Diplopia(better/worse/same since last visit): initial, present  Eyelid retraction(better/worse/same since last visit): initial, present  Tearing(better/worse/same since last visit): no  Redness (better/worse/same since last visit): initial, present  Pain ((better/worse/same since last visit): present  Pain to move the eyes (better/worse/same since last visit): present  Blurred vision: with dry eyes    Ocular history:   Orbital decompression (date, details): N/A  Strabismus surgery (date, details): N/A  Eyelid surgery (date, details): N/A    Exam:   Danny (base):99 22/24    Better/worse same:   Strabismus (better/worse/same): intial. Mild X 2 PD. Likes 2 BI prism.  Eyelid retraction (better/worse/same): present MRD 1 R/L:  7/8  MRD 2 R/L: 4/5    ELIDA Score:  1. Spontaneous orbital pain.     no  2. Gaze evoked orbital pain.     yes  3. Eyelid swelling due to active thyroid eye disease  yes  4. Eyelid erythema.      yes  5. Conjunctival redness due to active thyroid eye disease yes  6. Chemosis.        yes  7. Inflammation of caruncle OR plica.   yes    ELIDA SCORE = 6/7    Kaylyn Fischer is a 56 year old female with the following diagnoses:   1. Thyroid eye disease       Thyroid eye disease ([facility]).  The natural history of thyroid eye disease was discussed. We told the patient that typically RAMAN will worsen for a period of time, then improve to some degree, and then stabilize without normalizing.  This process can take somewhere between 1 and 3 years on average.  In the meantime, we recommended seeing the " [facility] in the [facility] 6 months (sooner if the patient experiences worsening vision in either eye).  Once the patient becomes stable for at least 6 months' time, we discussed that the patient may need restorative surgery or prisms.  Finally, we discussed that correcting the thyroid hormone levels does not ensure that the eyes will normalize but that it could help to some degree.       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    I discussed the use of teprotumumab for the treatment of thyroid eye disease. We discussed the common side effects including muscle cramping, nausea, diarrhea and headache. Less common side effects including change in blood sugar, alopecia and hearing changes were also discussed with the patient. We discussed alternative options including off-label use of medications which are not FDA approved for thyroid eye disease including pulse IV corticosteroids and alternative immuno-suppressive agents (e.g. rituximab and tocilizumab).  Ultimately we opted against rituximab and tocilizumab because their efficacy is not well proven for thyroid eye disease.  These drugs have never been studied in a randomized control trial for this indication (literature is limited to retrospective case reports and case series) and they both carry a risk of severe side-effects including potential progressive multifocal leukoencephalopathy.  We discussed corticosteroids as this can have some effect on the inflammatory component of thyroid eye disease but is not FDA approved for this use, does carry some risk for serious side-effects, and has not been shown to conclusively change the long-term outcome of the disease in quality, prospective randomized trials.     After a detailed conversation the patient and I have opted to proceed with initiation of teprotumumab treatment. The patient enrollment form will be filled out and faxed to [facility] and initiation of the treatment  authorization process through [place].      Weight: 139  Hyperglycemia: No   Inflammatory Bowel Disease: No  Pregnancy/Birth Control:  N/A  ELIDA score:  6/7  Eyelid retraction (Y/N)- see MRD1 measurements in exam: yes  Diplopia? (Y/N): no      Transient, inconstant, or constant? N/A  Proptosis? (Y/N) see Danny measurements in exam: Yes  Is thyroid eye disease active and progressive? (Y/N): Yes  Is patient followed by endocrinology or a primary care provider and working to optimize thyroid function with thyroid function monitoring? (Y/N): yes, last seen 9/24/21    Prior corticosteroids? No            Attending Physician Attestation:  Complete documentation of historical and exam elements from today's encounter can be found in the full encounter summary report (not reduplicated in this progress note).  I personally obtained the chief complaint(s) and history of present illness.  I confirmed and edited as necessary the review of systems, past medical/surgical history, family history, social history, and examination findings as documented by others; and I examined the patient myself.  I personally reviewed the relevant tests, images, and reports as documented above.  I formulated and edited as necessary the assessment and plan and discussed the findings and management plan with the patient and family. I personally reviewed the ophthalmic test(s) associated with this encounter, agree with the interpretation(s) as documented by the resident/fellow, and have edited the corresponding report(s) as necessary.  - Raghav Hurt, DO   PGY-5 Neuro-Ophthalmology Fellow

## 2021-10-22 ENCOUNTER — LAB (OUTPATIENT)
Dept: LAB | Facility: CLINIC | Age: 56
End: 2021-10-22
Payer: COMMERCIAL

## 2021-10-22 DIAGNOSIS — E05.00 GRAVES DISEASE: ICD-10-CM

## 2021-10-22 LAB
T3 SERPL-MCNC: 132 NG/DL (ref 60–181)
T4 FREE SERPL-MCNC: 1.07 NG/DL (ref 0.76–1.46)
TSH SERPL DL<=0.005 MIU/L-ACNC: <0.01 MU/L (ref 0.4–4)

## 2021-10-22 PROCEDURE — 84439 ASSAY OF FREE THYROXINE: CPT | Performed by: INTERNAL MEDICINE

## 2021-10-22 PROCEDURE — 84480 ASSAY TRIIODOTHYRONINE (T3): CPT | Performed by: INTERNAL MEDICINE

## 2021-10-22 PROCEDURE — 84443 ASSAY THYROID STIM HORMONE: CPT | Performed by: INTERNAL MEDICINE

## 2021-11-01 ENCOUNTER — DOCUMENTATION ONLY (OUTPATIENT)
Dept: LAB | Facility: CLINIC | Age: 56
End: 2021-11-01

## 2021-11-01 DIAGNOSIS — Z00.00 HEALTH CARE MAINTENANCE: Primary | ICD-10-CM

## 2021-11-03 ENCOUNTER — LAB (OUTPATIENT)
Dept: LAB | Facility: CLINIC | Age: 56
End: 2021-11-03
Payer: COMMERCIAL

## 2021-11-03 DIAGNOSIS — E05.00 GRAVES DISEASE: ICD-10-CM

## 2021-11-03 LAB — T3 SERPL-MCNC: 89 NG/DL (ref 60–181)

## 2021-11-03 PROCEDURE — 36415 COLL VENOUS BLD VENIPUNCTURE: CPT

## 2021-11-03 PROCEDURE — 84443 ASSAY THYROID STIM HORMONE: CPT

## 2021-11-03 PROCEDURE — 84439 ASSAY OF FREE THYROXINE: CPT

## 2021-11-03 PROCEDURE — 84480 ASSAY TRIIODOTHYRONINE (T3): CPT

## 2021-11-04 ENCOUNTER — MYC MEDICAL ADVICE (OUTPATIENT)
Dept: FAMILY MEDICINE | Facility: CLINIC | Age: 56
End: 2021-11-04

## 2021-11-04 DIAGNOSIS — E05.00 GRAVES DISEASE: ICD-10-CM

## 2021-11-04 DIAGNOSIS — H57.89 THYROID EYE DISEASE: Primary | ICD-10-CM

## 2021-11-04 DIAGNOSIS — E07.9 THYROID EYE DISEASE: Primary | ICD-10-CM

## 2021-11-04 DIAGNOSIS — Z87.891 PERSONAL HISTORY OF TOBACCO USE: Primary | ICD-10-CM

## 2021-11-04 LAB
T4 FREE SERPL-MCNC: 1.04 NG/DL (ref 0.76–1.46)
TSH SERPL DL<=0.005 MIU/L-ACNC: <0.01 MU/L (ref 0.4–4)

## 2021-11-04 RX ORDER — METHIMAZOLE 5 MG/1
5 TABLET ORAL DAILY
Qty: 90 TABLET | Refills: 0
Start: 2021-11-04 | End: 2022-01-10

## 2021-11-04 NOTE — PATIENT INSTRUCTIONS

## 2021-11-04 NOTE — TELEPHONE ENCOUNTER
[place] correspondence, agreed to place order as noted below.     Lung Cancer Screening Shared Decision Making Visit     Kaylyn Fischer is eligible for lung cancer screening on the basis of the information provided in my signed lung cancer screening order.     I have discussed with patient the risks and benefits of screening for lung cancer with low-dose CT.     The risks include:  radiation exposure: one low dose chest CT has as much ionizing radiation as about 15 chest x-rays or 6 months of background radiation living in [place]    false positives: 96% of positive findings/nodules are NOT cancer, but some might still require additional diagnostic evaluation, including biopsy  over-diagnosis: some slow growing cancers that might never have been clinically significant will be detected and treated unnecessarily     The benefit of early detection of lung cancer is contingent upon adherence to annual screening or more frequent follow up if indicated.     Furthermore, reaping the benefits of screening requires Kaylyn Fischer to be willing and physically able to undergo diagnostic procedures, if indicated. Although no specific guide is available for determining severity of comorbidities, it is reasonable to withhold screening in patients who have greater mortality risk from other diseases.     We did discuss that the only way to prevent lung cancer is to not smoke. Smoking cessation counseling was not given.      I did offer risk estimation using a calculator such as this one:    ShouldIScreen    Diagnoses and all orders for this visit:    Personal history of tobacco use  -     Prof Fee: Shared Decision Making Visit for Lung Cancer Screening  -     CT Chest Lung Cancer Scrn Low Dose wo; Future      Alberto Gold MD  8:59 AM, November 4, 2021

## 2021-11-15 ENCOUNTER — MYC REFILL (OUTPATIENT)
Dept: FAMILY MEDICINE | Facility: CLINIC | Age: 56
End: 2021-11-15
Payer: COMMERCIAL

## 2021-11-15 ENCOUNTER — MYC MEDICAL ADVICE (OUTPATIENT)
Dept: FAMILY MEDICINE | Facility: CLINIC | Age: 56
End: 2021-11-15
Payer: COMMERCIAL

## 2021-11-15 DIAGNOSIS — F41.9 ANXIETY: ICD-10-CM

## 2021-11-15 DIAGNOSIS — G47.00 INSOMNIA, UNSPECIFIED TYPE: ICD-10-CM

## 2021-11-15 DIAGNOSIS — N95.1 VASOMOTOR SYMPTOMS DUE TO MENOPAUSE: ICD-10-CM

## 2021-11-15 RX ORDER — ESTRADIOL 0.05 MG/D
PATCH TRANSDERMAL
Qty: 12 PATCH | Refills: 11 | Status: SHIPPED | OUTPATIENT
Start: 2021-11-15 | End: 2023-03-02

## 2021-11-15 RX ORDER — ZOLPIDEM TARTRATE 5 MG/1
5 TABLET ORAL
Qty: 30 TABLET | Refills: 0 | Status: SHIPPED | OUTPATIENT
Start: 2021-11-15 | End: 2022-01-12

## 2021-11-15 NOTE — TELEPHONE ENCOUNTER
reviewed. No concerns. Med refilled as noted below.    Kaylyn was seen today for refill request.    Diagnoses and all orders for this visit:    Insomnia, unspecified type  -     zolpidem (AMBIEN) 5 MG tablet; Take 1 tablet (5 mg) by mouth nightly as needed for sleep    Anxiety  -     zolpidem (AMBIEN) 5 MG tablet; Take 1 tablet (5 mg) by mouth nightly as needed for sleep      Alberto Gold MD  8:12 AM, November 15, 2021

## 2021-11-15 NOTE — TELEPHONE ENCOUNTER
Med refilled as noted below.    Diagnoses and all orders for this visit:    Vasomotor symptoms due to menopause  -     estradiol (CLIMARA) 0.05 MG/24HR weekly patch; APPLY 1 PATCH TOPICALLY TO  SKIN ONCE A WEEK    Alberto Gold MD  2:55 PM, November 15, 2021

## 2021-11-16 ENCOUNTER — LAB (OUTPATIENT)
Dept: LAB | Facility: CLINIC | Age: 56
End: 2021-11-16
Payer: COMMERCIAL

## 2021-11-16 DIAGNOSIS — E05.00 GRAVES DISEASE: ICD-10-CM

## 2021-11-16 DIAGNOSIS — E07.9 THYROID EYE DISEASE: ICD-10-CM

## 2021-11-16 DIAGNOSIS — H57.89 THYROID EYE DISEASE: ICD-10-CM

## 2021-11-16 LAB
FASTING STATUS PATIENT QL REPORTED: NO
GLUCOSE BLD-MCNC: 125 MG/DL (ref 70–99)
T3 SERPL-MCNC: 126 NG/DL (ref 60–181)
T4 FREE SERPL-MCNC: 1.19 NG/DL (ref 0.76–1.46)
TSH SERPL DL<=0.005 MIU/L-ACNC: <0.01 MU/L (ref 0.4–4)

## 2021-11-16 PROCEDURE — 82947 ASSAY GLUCOSE BLOOD QUANT: CPT

## 2021-11-16 PROCEDURE — 84439 ASSAY OF FREE THYROXINE: CPT

## 2021-11-16 PROCEDURE — 84480 ASSAY TRIIODOTHYRONINE (T3): CPT

## 2021-11-16 PROCEDURE — 36415 COLL VENOUS BLD VENIPUNCTURE: CPT

## 2021-11-16 PROCEDURE — 84443 ASSAY THYROID STIM HORMONE: CPT

## 2021-11-17 ENCOUNTER — OFFICE VISIT (OUTPATIENT)
Dept: AUDIOLOGY | Facility: CLINIC | Age: 56
End: 2021-11-17
Payer: COMMERCIAL

## 2021-11-17 DIAGNOSIS — H90.42 SENSORINEURAL HEARING LOSS (SNHL) OF LEFT EAR WITH UNRESTRICTED HEARING OF RIGHT EAR: Primary | ICD-10-CM

## 2021-11-17 PROCEDURE — 92550 TYMPANOMETRY & REFLEX THRESH: CPT | Performed by: AUDIOLOGIST

## 2021-11-17 PROCEDURE — 92557 COMPREHENSIVE HEARING TEST: CPT | Performed by: AUDIOLOGIST

## 2021-11-17 NOTE — PROGRESS NOTES
AUDIOLOGY REPORT    SUMMARY: Audiology visit completed. See audiogram for results.      RECOMMENDATIONS: Follow-up with ENT.    Sharona Zavala.  Licensed Audiologist  [place] # [id number]

## 2021-11-18 DIAGNOSIS — H90.3 ASYMMETRICAL SENSORINEURAL HEARING LOSS: Primary | ICD-10-CM

## 2021-11-19 ENCOUNTER — TELEPHONE (OUTPATIENT)
Dept: OTOLARYNGOLOGY | Facility: CLINIC | Age: 56
End: 2021-11-19
Payer: COMMERCIAL

## 2021-11-22 ENCOUNTER — LAB (OUTPATIENT)
Dept: LAB | Facility: CLINIC | Age: 56
End: 2021-11-22
Payer: COMMERCIAL

## 2021-11-22 DIAGNOSIS — E05.00 GRAVES DISEASE: ICD-10-CM

## 2021-11-22 LAB
T3 SERPL-MCNC: 93 NG/DL (ref 60–181)
T4 FREE SERPL-MCNC: 1.04 NG/DL (ref 0.76–1.46)
TSH SERPL DL<=0.005 MIU/L-ACNC: <0.01 MU/L (ref 0.4–4)

## 2021-11-22 PROCEDURE — 36415 COLL VENOUS BLD VENIPUNCTURE: CPT

## 2021-11-22 PROCEDURE — 84443 ASSAY THYROID STIM HORMONE: CPT

## 2021-11-22 PROCEDURE — 84439 ASSAY OF FREE THYROXINE: CPT

## 2021-11-22 PROCEDURE — 84480 ASSAY TRIIODOTHYRONINE (T3): CPT

## 2021-11-22 ASSESSMENT — PATIENT HEALTH QUESTIONNAIRE - PHQ9
10. IF YOU CHECKED OFF ANY PROBLEMS, HOW DIFFICULT HAVE THESE PROBLEMS MADE IT FOR YOU TO DO YOUR WORK, TAKE CARE OF THINGS AT HOME, OR GET ALONG WITH OTHER PEOPLE: SOMEWHAT DIFFICULT
SUM OF ALL RESPONSES TO PHQ QUESTIONS 1-9: 10
SUM OF ALL RESPONSES TO PHQ QUESTIONS 1-9: 10

## 2021-11-22 ASSESSMENT — ANXIETY QUESTIONNAIRES
2. NOT BEING ABLE TO STOP OR CONTROL WORRYING: MORE THAN HALF THE DAYS
8. IF YOU CHECKED OFF ANY PROBLEMS, HOW DIFFICULT HAVE THESE MADE IT FOR YOU TO DO YOUR WORK, TAKE CARE OF THINGS AT HOME, OR GET ALONG WITH OTHER PEOPLE?: SOMEWHAT DIFFICULT
7. FEELING AFRAID AS IF SOMETHING AWFUL MIGHT HAPPEN: MORE THAN HALF THE DAYS
3. WORRYING TOO MUCH ABOUT DIFFERENT THINGS: MORE THAN HALF THE DAYS
6. BECOMING EASILY ANNOYED OR IRRITABLE: SEVERAL DAYS
GAD7 TOTAL SCORE: 12
7. FEELING AFRAID AS IF SOMETHING AWFUL MIGHT HAPPEN: MORE THAN HALF THE DAYS
5. BEING SO RESTLESS THAT IT IS HARD TO SIT STILL: SEVERAL DAYS
1. FEELING NERVOUS, ANXIOUS, OR ON EDGE: MORE THAN HALF THE DAYS
4. TROUBLE RELAXING: MORE THAN HALF THE DAYS
GAD7 TOTAL SCORE: 12
GAD7 TOTAL SCORE: 12

## 2021-11-23 ENCOUNTER — VIRTUAL VISIT (OUTPATIENT)
Dept: BEHAVIORAL HEALTH | Facility: CLINIC | Age: 56
End: 2021-11-23
Payer: COMMERCIAL

## 2021-11-23 ENCOUNTER — VIRTUAL VISIT (OUTPATIENT)
Dept: PSYCHIATRY | Facility: CLINIC | Age: 56
End: 2021-11-23
Attending: FAMILY MEDICINE
Payer: COMMERCIAL

## 2021-11-23 VITALS — BODY MASS INDEX: 19.61 KG/M2 | WEIGHT: 129 LBS

## 2021-11-23 DIAGNOSIS — E07.9 THYROID EYE DISEASE: ICD-10-CM

## 2021-11-23 DIAGNOSIS — F41.9 ANXIETY: Primary | ICD-10-CM

## 2021-11-23 DIAGNOSIS — F32.81 PREMENSTRUAL DYSPHORIC DISORDER IN REMISSION: ICD-10-CM

## 2021-11-23 DIAGNOSIS — G47.00 INSOMNIA, UNSPECIFIED TYPE: Primary | ICD-10-CM

## 2021-11-23 DIAGNOSIS — F41.9 ANXIETY: ICD-10-CM

## 2021-11-23 DIAGNOSIS — E05.00 GRAVES DISEASE: ICD-10-CM

## 2021-11-23 DIAGNOSIS — H57.89 THYROID EYE DISEASE: ICD-10-CM

## 2021-11-23 DIAGNOSIS — G47.00 INSOMNIA, UNSPECIFIED TYPE: ICD-10-CM

## 2021-11-23 PROCEDURE — 99205 OFFICE O/P NEW HI 60 MIN: CPT | Mod: GT | Performed by: PSYCHIATRY & NEUROLOGY

## 2021-11-23 PROCEDURE — 99417 PROLNG OP E/M EACH 15 MIN: CPT | Mod: GT | Performed by: PSYCHIATRY & NEUROLOGY

## 2021-11-23 PROCEDURE — 90791 PSYCH DIAGNOSTIC EVALUATION: CPT | Mod: GT | Performed by: SOCIAL WORKER

## 2021-11-23 RX ORDER — MIRTAZAPINE 15 MG/1
15 TABLET, FILM COATED ORAL AT BEDTIME
Qty: 30 TABLET | Refills: 1 | Status: SHIPPED | OUTPATIENT
Start: 2021-11-23 | End: 2022-01-13

## 2021-11-23 ASSESSMENT — COLUMBIA-SUICIDE SEVERITY RATING SCALE - C-SSRS
1. IN THE PAST MONTH, HAVE YOU WISHED YOU WERE DEAD OR WISHED YOU COULD GO TO SLEEP AND NOT WAKE UP?: NO
2. HAVE YOU ACTUALLY HAD ANY THOUGHTS OF KILLING YOURSELF LIFETIME?: NO
5. HAVE YOU STARTED TO WORK OUT OR WORKED OUT THE DETAILS OF HOW TO KILL YOURSELF? DO YOU INTEND TO CARRY OUT THIS PLAN?: NO
1. IN THE PAST MONTH, HAVE YOU WISHED YOU WERE DEAD OR WISHED YOU COULD GO TO SLEEP AND NOT WAKE UP?: NO
3. HAVE YOU BEEN THINKING ABOUT HOW YOU MIGHT KILL YOURSELF?: NO
4. HAVE YOU HAD THESE THOUGHTS AND HAD SOME INTENTION OF ACTING ON THEM?: NO
2. HAVE YOU ACTUALLY HAD ANY THOUGHTS OF KILLING YOURSELF?: NO
5. HAVE YOU STARTED TO WORK OUT OR WORKED OUT THE DETAILS OF HOW TO KILL YOURSELF? DO YOU INTEND TO CARRY OUT THIS PLAN?: NO
4. HAVE YOU HAD THESE THOUGHTS AND HAD SOME INTENTION OF ACTING ON THEM?: NO

## 2021-11-23 ASSESSMENT — ANXIETY QUESTIONNAIRES: GAD7 TOTAL SCORE: 12

## 2021-11-23 ASSESSMENT — PATIENT HEALTH QUESTIONNAIRE - PHQ9: SUM OF ALL RESPONSES TO PHQ QUESTIONS 1-9: 10

## 2021-11-23 NOTE — PROGRESS NOTES
Telemedicine Visit: The patient's condition can be safely assessed and treated via synchronous audio and visual telemedicine encounter.      Reason for Telemedicine Visit: Services only offered telehealth    Originating Site (Patient Location): Patient's home    Distant Site (Provider Location): Provider Remote Setting- Home Office    Consent:  The patient/guardian has verbally consented to: the potential risks and benefits of telemedicine (video visit) versus in person care; bill my insurance or make self-payment for services provided; and responsibility for payment of non-covered services.     Mode of Communication:  Video Conference via Plainlegal    As the provider I attest to compliance with applicable laws and regulations related to telemedicine.                                                               Outpatient Psychiatric Evaluation- Standard  Adult    Name:  Kaylyn Fischer  : 1965    Source of Referral:  Primary Care Provider: Alberto Gold MD   Current Psychotherapist: Gloria Lizarraga, private practice     Identifying Data:  Patient is a 56 year old  female  who presents for initial visit.  Patient attended the session alone. Patient prefers to be called Kaylyn.    My Practice Policy was reviewed.     Chief Complaint:  Sleep and anxiety    HPI:  [facility]  Pt shares that she was dx'd with Graves Disease in August. She shares that she now has medication for her thyroid but since August she has had a lot of issues with insomnia and anxiety. Feels that her anxiety is related to insomnia and poor sleep.  Is prescribed ativan by her provider which helps a bit but not with sleep so was also prescribed ambien. She shares the the ambien puts her to sleep but she doesn't stay sleeping and wakes often in the night and has a hard time falling back asleep.  Is noticing the lack of sleep and anxiety are affecting her work productivity. With her high anxiety is noticing she is a  "little restless and is wanting to do more and be around people more but does have a lack of confidence and doubts herself and her abilities.  Pt lives with her wife and share that she has a good support system. Pt has one son who recently moved to [place] for college which pt shares has been hard on her anxiety a bit as she misses him and is having a hard time adjusting to \"an empty nest\". Is leaving today to drive to [place] to visit her sister for the Thanksgiving holiday. Pt denies and ROSE MARY Patiño reports that she was diagnosed with Graves' disease in August.  She did have treatment to decrease her thyroid hormone, which initially caused a significant but short-lived depressive episode.  She is now on a lower dose of medication and her thyroid hormone levels seem to be leveling out, although she still does not feel like herself.    She reports that she started getting eye involvement around September and started trying to sleep on her back, because she felt that her eyes were more swollen on the side that she slept on.  Unfortunately, she did not sleep well and has not slept well since early September.  She has recently started on Teppezza infusions which are helping her vision and the swelling in her eyes.  Unfortunately, it seems to be affecting her ears, she reports feeling like she is underwater.  Her hearing was tested and is in the normal range.    She reports that she has never had problems with depression, anxiety, or insomnia in the past.  However at the end of our session, she reported that she been treated for several years for premenstrual dysphoric disorder and had been on citalopram and Lexapro in the past.  She expressed interest in being on an antidepressant, and reports that her wife had encouraged her to go back on her Lexapro, but she had a difficult time discontinuing Lexapro, so is hesitant to restart it.    As above, her sleep problems really started in September.  Previously, she was able " "to go to sleep easily and stay asleep for 9 or more hours per day.  Her endocrinologist prescribed lorazepam 0.5 mg for sleep, which helped her anxiety, but did not really help with her sleep.  Her primary care provider prescribed Ambien, which keeps her asleep for about 4 hours a night.  Typically she has been going to sleep around 10 PM and then waking up at midnight.  She takes her Ambien then and sleeps until about 4 AM.  She feels tired all the time and reports that her fatigue increases her anxiety, irritability, and ability to concentrate and function at work.  She has also tried CBD Gummies, which help at times, but are not reliable.  She tried Benadryl, but it does not really make her sleep.  She has not tried melatonin.    She reports that her anxiety is bad.  She feels restless, her mind \"spins,\" she worries about everything.  Although it was \"consuming her,\" it has improved to some extent.  It seems to wax and wane with her sleep.  She reports that \"the only thing that soothes her is talking and listening,\" so she has been craving social contact.    She is usually pretty laid back about the holidays, but this year they are causing her a lot of stress.  She has a son, but has not seen him in 2 years because of the pandemic.  She worries that she is \"letting everyone down.\"  She has been functioning at work, but only at a minimal level, and reports that she has been \"flying under the radar.\"    Psychiatric Review of Symptoms:  Depression: Kaylyn is not sure if she is depressed now, but rates her mood as 5 out of 10 with 10 being the best.  She is interested in her normal activities but lacks confidence.  She is able to enjoy things, although her anxiety does affect her ability to enjoy them.  She does not feel sad.  She lost about 30 pounds since being diagnosed with Graves' disease.  She reports insomnia since early September.  She is more restless than is typical.  She denies loss of energy.  She denies " being hopeless or helpless, but does feel worthless and guilty.  She reports it is difficult to think, she has hard time concentrating, and she is very indecisive.  She denies any thoughts of death or suicide.     Mood rating (1 to 10 with 10 being the best): 5   PHQ-9 scores:   PHQ-9 SCORE 11/24/2016 11/22/2021 11/22/2021   PHQ-9 Total Score - - -   PHQ-9 Total Score MyChart - 10 (Moderate depression) 10 (Moderate depression)   PHQ-9 Total Score 0 10 10       Linsey: No history of manic episodes.   MDQ Score: Not completed    Anxiety: Kaylyn reports that she has always been a worrier and a control freak, but has not been treated for it in the past.  She reports that she worries excessively and finds it hard to control her worry.  She feels restless, has poor concentration, is irritable, has muscle tension, and sleep disturbance.   FIFI-7 scores:    FIFI-7 SCORE 11/24/2016 11/22/2021 11/22/2021   Total Score - - -   Total Score - 12 (moderate anxiety) 12 (moderate anxiety)   Total Score 1 12 12       Panic: No history of panic attacks.    PTSD: No history of life-threatening trauma.    OCD: No history of obsessive thoughts or compulsive behaviors.    Psychosis: No history of auditory or visual hallucinations, paranoid ideations, ideas of reference, or thought insertion or extraction.    Impulse control: No history of gambling, shoplifting, or violent outbursts.    Eating Disorder: No history of binging, purging, or restricting calories.    Psychiatric History:   No previous psychiatric hospitalizations    No history of chemical dependency treatment    Suicide Risk Assessment:  Suicide Attempts: No   Self-injurious Behavior: Denies    Past Medical History:  Medical history:   Past Medical History:   Diagnosis Date     Adhesive capsulitis of right shoulder 4/2016 & 9/2016    cortisone injection & manipulation with anesthesia      Genital herpes     Valtrex     Genital warts     Aldara     PMDD (premenstrual dysphoric  disorder) 2010    Lexapro       Surgical history:   Past Surgical History:   Procedure Laterality Date     COLONOSCOPY N/A 1/29/2016     COLONOSCOPY WITH CO2 INSUFFLATION N/A 1/29/2016    NL, repeat 10 years      ESOPHAGOSCOPY, GASTROSCOPY, DUODENOSCOPY (EGD), COMBINED N/A 8/27/2021    Procedure: ESOPHAGOGASTRODUODENOSCOPY (EGD);  Surgeon: Parvin Guerra MD;  Location:  GI     RETINAL REATTACHMENT       [facility] NONSPECIFIC PROCEDURE  9/2016    manipulation left shoulder        Pregnancy status: Not pregnant    Trauma: No history of head trauma or seizures.    Review of Systems:  10 systems (general, cardiovascular, respiratory, eyes, ENT, endocrine, GI, , M/S, neurological) were reviewed. Most pertinent findings include Graves' disease.    Current Medications:    Current Outpatient Medications:      Artificial Tear Solution (JUST TEARS EYE DROPS) SOLN, Place 1 drop into both eyes 2 times daily, Disp: 15 mL, Rfl: 3     estradiol (CLIMARA) 0.05 MG/24HR weekly patch, APPLY 1 PATCH TOPICALLY TO  SKIN ONCE A WEEK, Disp: 12 patch, Rfl: 11     HEMP OIL OR EXTRACT OR OTHER CBD CANNABINOID, NOT MEDICAL CANNABIS,, CBD Gummies, Disp: , Rfl:      LORazepam (ATIVAN) 0.5 MG tablet, Take 1 tablet (0.5 mg) by mouth nightly as needed for anxiety or sleep, Disp: 30 tablet, Rfl: 0     methimazole (TAPAZOLE) 5 MG tablet, Take 1 tablet (5 mg) by mouth daily, Disp: 90 tablet, Rfl: 0     mirtazapine (REMERON) 15 MG tablet, Take 1 tablet (15 mg) by mouth At Bedtime, Disp: 30 tablet, Rfl: 1     propranolol (INDERAL) 10 MG tablet, Take 1 tablet (10 mg) by mouth 3 times daily (Patient taking differently: Take 10 mg by mouth once as needed ), Disp: 90 tablet, Rfl: 3     Selenium 100 MCG CAPS, Take 100 mcg by mouth 2 times daily, Disp: 180 capsule, Rfl: 1     zolpidem (AMBIEN) 5 MG tablet, Take 1 tablet (5 mg) by mouth nightly as needed for sleep, Disp: 30 tablet, Rfl: 0    Supplements: Reviewed per Electronic Medical Record Today    The  [place] Prescription Monitoring Program has been reviewed and there are no concerns about diversionary activity for controlled substances at this time.    Past medication trials include but are not limited to:   Citalopram, Lexapro, propranolol, lorazepam, zolpidem    Allergies:  Patient has no known allergies.    Vital Signs:  Vitals: Wt 58.5 kg (129 lb)   LMP 2016 (Exact Date)   BMI 19.61 kg/m      Labs:  Most recent laboratory results reviewed and the pertinent results include:   TSH   Date Value Ref Range Status   2021 <0.01 (L) 0.40 - 4.00 mU/L Final   06/15/2015 0.89 0.40 - 4.00 mU/L Final     Ref Range & Units 1 d ago   (21) 7 d ago   (21) 2 wk ago   (11/3/21) 1 mo ago   (10/22/21) 1 mo ago   (10/11/21) 1 mo ago   (10/4/21) 1 mo ago   (21)      Free T4 0.76 - 1.46 ng/dL 1.04  1.19  1.04  1.07  1.41  1.46  0.57 Low     Resulting Agency  [facility]              Specimen Collected: 21 11:29 AM Last Resulted: 21  8:12 PM           Ref Range & Units 1 d ago   (21) 7 d ago   (21) 2 wk ago   (11/3/21) 1 mo ago   (10/22/21) 1 mo ago   (10/11/21) 1 mo ago   (10/4/21) 1 mo ago   (21)      T3 Total 60 - 181 ng/dL 93  126  89  132  146  223 High   96    Resulting Agency  [facility]         Most recent EKG None    Substance Use History:  Social History     Tobacco Use     Smoking status: Former Smoker     Packs/day: 0.50     Years: 20.00     Pack years: 10.00     Types: Cigarettes     Quit date: 2003     Years since quittin.8     Smokeless tobacco: Never Used   Vaping Use     Vaping Use: Never used   Substance Use Topics     Alcohol use: Yes     Comment: for several months no alcohol.  very little now     Drug use: No     Comment: CBD gummies.  doesn't help with sleep like hoped.      Caffeine: She drinks green tea.  Family History:   Patient reported family history includes:  "  Family History   Problem Relation Age of Onset     Heart Disease Sister         PVC, tx'd beta blocker     Hypertension Paternal Grandmother      Diabetes No family hx of      Glaucoma No family hx of      Macular Degeneration No family hx of      Thyroid Disease No family hx of        Developmental History:  Not explored    Social History:   Per DA by Gianna Arnold [facility]:  Patient reported they grew up in [place].  They were raised by biological parents. Father moved out when pt was in 8th grade.  Has 3 siblings.  Patient reported that   childhood was \"a dream\".  Patient denies experiencing childhood abuse/neglect. Patient described their current relationships with family of origin as \"very good\".       The patient has been  1 times and has 1 children.  Has 1 adult son.  Hard time accepting that son is away at college and she is an empty tello.  She described the relationship with   spouse as, \"very good.\" Patient reported having few good friends.     Patient reported   highest education level was graduate school. The patient did not serve in the .  Patient is currently employed full time and reports they are able to function appropriately at work.. Is noticing some changes in productivity in the past few months.  Working can also be comforting.    Patient reported that they have been involved with the legal system.   Patient denies being on probation / parole / under the jurisdiction of the court.    Mental Status Examination:     Kaylyn is a 56-year-old woman who appears her stated age and in some emotional distress.  Speech is clear but slightly pressured.  Eye contact is good over the video connection, her right eye appears swollen in comparison with her left eye.  Motor behavior is slightly restless.  Affect is labile, she is tearful at times.  Mood is anxious and slightly depressed.  Thoughts are logical with no loose associations or flight of ideas.  Thought content shows no " psychosis.  No suicidal thoughts.    Sensorium is clear.  She is oriented to person, place, and time.  Memory appears to be somewhat impaired for immediate events, but is intact for recent and remote events.  Attention and concentration were slightly impaired.  Personal insight is good.  Personal and impersonal judgment appear to be intact.    Assessment and Plan:  Kaylyn is a 56-year-old woman with a history of premenstrual dysphoric disorder that responded well to citalopram and escitalopram.  She was diagnosed with Graves' disease in August 2021, and has developed severe anxiety and insomnia.      ICD-10-CM    1. Insomnia, unspecified type  G47.00 mirtazapine (REMERON) 15 MG tablet   2. Anxiety  F41.9    3. Premenstrual dysphoric disorder in remission  F32.81    4. 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     Treatment Plan:  We discussed several options for treatment of insomnia including zolpidem, mirtazapine, trazodone, melatonin, and cognitive behavioral therapy for insomnia.  After extensive discussion, we agreed to a trial of mirtazapine 15 mg at bedtime.    She requested an antidepressant toward the end of our session, and reported that she had been treated for premenstrual dysphoric disorder for several years.  We considered restarting escitalopram, which she discontinued on her own about 2 years ago.  She was not enthusiastic about that option, as she had significant adverse side effects when tapering off of it.    Patient Instructions   Continue zolpidem 5 mg at bedtime.  You may try increasing the dose to 10 mg at bedtime or 5 mg at bedtime and an addition 2.5 to 5 mg when you awake in the night.    Start mirtazapine 15 mg at bedtime.    You may try melatonin 3 mg about 3 hours before bedtime  if you like.    Continue all other medications per your primary care provider.    Schedule an appointment with me in 4 weeks or sooner as needed.  You may call [facility] at [phone] to schedule.    [facility] [facility]:      Go to the Emergency Department as needed or call after hours crisis line at [phone].      To schedule individual or family therapy, call [facility] at [phone].     Follow up with primary care provider as planned or sooner for acute medical concerns.    Call the psychiatric nurse line with medication questions or concerns at [phone].    [employer] may be used to communicate with your provider, but this is not intended to be used for emergencies.    Community Resources:      National Suicide Prevention Lifeline: 350.520.2168 (TTY: 397.285.8885). Call anytime for help.  (www.suicidepreventionlifeline.org)    National [place] on Mental Illness (www.nathan.org): 920.876.3574 or 848-503-2301.     Mental Health Association (www.mentalhealth.org): 169.226.4133 or 720-122-7568.    [place] Crisis Text [place]: Text [place] to 189359    Suicide LifeLine Chat: suicidepreFirstFuel Softwareline.org/chat         Patient Status:  Patient will continue to be seen for ongoing consultation and stabilization.    Video call duration: 1 hour 26 minutes   Start: 1:37 PM   Stop: 3:03 PM  Total time spent, including chart review and documentation: 131 minutes

## 2021-11-23 NOTE — PATIENT INSTRUCTIONS
Continue zolpidem 5 mg at bedtime.  You may try increasing the dose to 10 mg at bedtime or 5 mg at bedtime and an addition 2.5 to 5 mg when you awake in the night.    Start mirtazapine 15 mg at bedtime.    You may try melatonin 3 mg about 3 hours before bedtime if you like.    Continue all other medications per your primary care provider.    Schedule an appointment with me in 4 weeks or sooner as needed.  You may call [facility] at [phone] to schedule.    [facility]:      Go to the Emergency Department as needed or call after hours crisis line at [phone].      To schedule individual or family therapy, call [facility] at [phone].     Follow up with primary care provider as planned or sooner for acute medical concerns.    Call the psychiatric nurse line with medication questions or concerns at [phone].    [employer] may be used to communicate with your provider, but this is not intended to be used for emergencies.    Community Resources:      National Suicide Prevention Lifeline: 256.536.8732 (TTY: 438.380.4488). Call anytime for help.  (www.suicidepreventionlifeline.org)    National [place] on Mental Illness (www.nathan.org): 152.693.1268 or 619-679-5398.     Mental Health Association (www.mentalhealth.org): 355.994.1073 or 178-819-4904.    [place] Crisis Text Line: Text [place] to 467972    Suicide LifeLine Chat: suicidepreventionlifeline.org/chat

## 2021-11-23 NOTE — PROGRESS NOTES
Collaborative Care Psychiatry Service  Provider Name: Gianna Arnold, [facility], [facility]    PATIENT'S NAME: Kaylyn Fischer  PREFERRED NAME: Kaylyn  PREFERRED PRONOUNS:    MRN:   [id number]  :   1965   ACCT. NUMBER: [id number]  DATE OF SERVICE: 21  START TIME: 12:49p  END TIME: 1:20p    BRIEF ADULT DIAGNOSTIC ASSESSMENT    Telemedicine Visit: The patient's condition can be safely assessed and treated via synchronous audio and visual telemedicine encounter.      Reason for Telemedicine Visit: Services only offered telehealth    Originating Site (Patient Location): Patient's home    Distant Site (Provider Location): Provider Remote Setting- Home Office    Consent:  The patient/guardian has verbally consented to: the potential risks and benefits of telemedicine (video visit) versus in person care; bill my insurance or make self-payment for services provided; and responsibility for payment of non-covered services.     Mode of Communication:  Video Conference via [employer]    As the provider I attest to compliance with applicable laws and regulations related to telemedicine.    Identifying Information:  Patient is a 56 year old, .  The pronoun use throughout this assessment reflects the patient's chosen pronoun.  Patient was referred for an assessment by primary care provider.  Patient attended the session alone.     Chief Complaint:   Pt shares that she was dx'd with Graves Disease in August. She shares that she now has medication for her thyroid but since August she has had a lot of issues with insomnia and anxiety.  Feels that her anxiety is related to insomnia and poor sleep.    Is prescribed ativan by her provider which helps a bit but not with sleep so was also prescribed ambien.  She shares the the ambien puts her to sleep but she doesn't stay sleeping and wakes often in the night and has a hard time falling back asleep.  Is noticing the lack of sleep and anxiety are affecting her work  "productivity.  With her high anxiety is noticing she is a little restless and is wanting to do more and be around people more but does have a lack of confidence and doubts herself and her abilities.    Pt lives with her wife and share that she has a good support system.  Pt has one son who recently moved to [place] for college which pt shares has been hard on her anxiety a bit as she misses him and is having a hard time adjusting to \"an empty nest\".  Is leaving today to drive to [place] to visit her sister for the Thanksgiving holiday.  Pt denies and SI.  Pt recently started seeing a therapist over the past couple of months.    Does the client have any condition that is currently presenting as a potential to harm themselves or others (severe withdrawal, serious medical condition, severe emotional/behavioral problem)? No.  Proceed with assessment.    Review of Symptoms per patient report:  Depression: Change in sleep, Difficulties concentrating, Low self-worth and Irritability  Linsey:  No Symptoms  Psychosis: No Symptoms  Anxiety: Excessive worry, Nervousness, Physical complaints, such as headaches, stomachaches, muscle tension and feels like knot in stomach  Panic:  Palpitations  Post Traumatic Stress Disorder:  No Symptoms   Eating Disorder: No Symptoms  ADD / ADHD:  No symptoms  Conduct Disorder: No symptoms  Autism Spectrum Disorder: No symptoms  Obsessive Compulsive Disorder: No Symptoms    Sleep: difficulty lately since thyroid issues.  Has a hard time staying asleep.      Caffeine: no coffee recently  Tobacco:    Current alcohol use: very little since getting dx'd with Graves.  Does like to drink wine if able.  Current drug use: does take CBD gummies sometimes to see if will help sleep.    Rating Scales:  PHQ-9:   [facility] Follow-up to PHQ 11/24/2016 11/22/2021 11/22/2021   PHQ-9 9. Suicide Ideation past 2 weeks Not at all Not at all Not at all      GAD7:    FIFI-7 SCORE 11/24/2016 11/22/2021 11/22/2021   Total " Score - - -   Total Score - 12 (moderate anxiety) 12 (moderate anxiety)   Total Score 1 12 12     CGI:  First:  No data recorded  Most recent:  No data recorded    WHODAS:   WHODAS 2.0 Total Score 11/19/2021   Total Score 18   Total Score MyChart 18        CAGE:    CAGE-AID Flowsheet 11/23/2021   Have you ever felt you should Cut down on your drinking or drug use? 0   Have people Annoyed you by criticizing your drinking or drug use? 0   Have you ever felt bad or Guilty about your drinking or drug use? 0   Have you ever had a drink or used drugs first thing in the morning to steady your nerves or to get rid of a hangover? (Eye opener) 0   CAGE-AID SCORE 0   1. Have you felt you ought to cut down on your drinking or drug use? No   2. Have people annoyed you by criticizing your drinking or drug use? No   3. Have you felt bad or guilty about your drinking or drug use? No   4. Have you ever had a drink or used drugs first thing in the morning to steady your nerves or to get rid of a hangover (eye opener)? No   CAGE-AID SCORE 0 (A total score of 2 or greater is considered clinically significant)         Personal Medical History:  Past Medical History:   Diagnosis Date     Adhesive capsulitis of right shoulder 4/2016 & 9/2016    cortisone injection & manipulation with anesthesia      Genital herpes     Valtrex     Genital warts     Aldara     PMDD (premenstrual dysphoric disorder) 2010    Lexapro       Patient has  received mental health services in the past: therapy with current therapist.  Psychiatric Hospitalizations: None.  Patient denies a history of civil commitment. Currently, patient is receiving other mental health services.  These include psychotherapy with private practice therapist for past few months.     Patient does not report a history of head injury / trauma / cognitive impairment / seizures.      Current Medications:  Current Outpatient Medications   Medication Sig Dispense Refill     Artificial Tear  "Solution (JUST TEARS EYE DROPS) SOLN Place 1 drop into both eyes 2 times daily 15 mL 3     estradiol (CLIMARA) 0.05 MG/24HR weekly patch APPLY 1 PATCH TOPICALLY TO  SKIN ONCE A WEEK 12 patch 11     HEMP OIL OR EXTRACT OR OTHER CBD CANNABINOID, NOT MEDICAL CANNABIS, CBD Gummies       LORazepam (ATIVAN) 0.5 MG tablet Take 1 tablet (0.5 mg) by mouth nightly as needed for anxiety or sleep 30 tablet 0     methimazole (TAPAZOLE) 5 MG tablet Take 1 tablet (5 mg) by mouth daily 90 tablet 0     propranolol (INDERAL) 10 MG tablet Take 1 tablet (10 mg) by mouth 3 times daily (Patient taking differently: Take 10 mg by mouth once as needed ) 90 tablet 3     Selenium 100 MCG CAPS Take 100 mcg by mouth 2 times daily 180 capsule 1     zolpidem (AMBIEN) 5 MG tablet Take 1 tablet (5 mg) by mouth nightly as needed for sleep 30 tablet 0        Allergies:  No Known Allergies    Family Psychiatric History:  Patient did not report a family history of mental health concerns.     Family History     Problem (# of Occurrences) Relation (Name,Age of Onset)    Heart Disease (1) Sister: PVC, tx'd beta blocker    Hypertension (1) Paternal Grandmother       Negative family history of: Diabetes, Glaucoma, Macular Degeneration, Thyroid Disease          Social/Family History:  Patient reported they grew up in [place].  They were raised by biological parents. Father moved out when pt was in 8th grade.  Has 3 siblings.  Patient reported that   childhood was \"a dream\".  Patient denies experiencing childhood abuse/neglect. Patient described their current relationships with family of origin as \"very good\".      The patient has been  1 times and has 1 children.  Has 1 adult son.  Hard time accepting that son is away at college and she is an empty tello.  She described the relationship with   spouse as, \"very good.\" Patient reported having few good friends.     Cultural influences and impact on patient's life structure, values, norms, and " healthcare: Pt reports that she is lesbian.  Shares family is supportive.. Patient identified their preferred language to be English. Patient reported they does not need the assistance of an  or other support involved in treatment.       Educational/Occupational History:  Patient reported   highest education level was graduate school. The patient did not serve in the .  Patient is currently employed full time and reports they are able to function appropriately at work.. Is noticing some changes in productivity in the past few months.  Working can also be comforting.      Social History     Socioeconomic History     Marital status:      Spouse name: Not on file     Number of children: 0     Years of education: Not on file     Highest education level: Not on file   Occupational History     Occupation:      Employer: [employer]   Tobacco Use     Smoking status: Former Smoker     Packs/day: 0.50     Years: 20.00     Pack years: 10.00     Types: Cigarettes     Quit date: 2003     Years since quittin.8     Smokeless tobacco: Never Used   Vaping Use     Vaping Use: Never used   Substance and Sexual Activity     Alcohol use: Yes     Alcohol/week: 0.0 standard drinks     Comment: 7 drinks week     Drug use: No     Sexual activity: Yes     Partners: Female   Other Topics Concern     Parent/sibling w/ CABG, MI or angioplasty before 65F 55M? Not Asked   Social History Narrative     Not on file     Social Determinants of Health     Financial Resource Strain: Not on file   Food Insecurity: Not on file   Transportation Needs: Not on file   Physical Activity: Not on file   Stress: Not on file   Social Connections: Not on file   Intimate Partner Violence: Not on file   Housing Stability: Not on file       Patient reported that they have been involved with the legal system.   Patient denies being on probation / parole / under the jurisdiction of the court.    Current Mental Status  Exam:   Appearance:   Appropriate    Eye Contact:   Good   Psychomotor:   Normal   Attitude / Demeanor:  Cooperative   Speech      Rate / Production:  Normal/ Responsive      Volume:   Normal  volume      Language:   intact  Mood:    Normal  Affect:    Appropriate    Thought Content:  Clear   Thought Process:  Coherent       Associations:  No loosening of associations  Insight:    Good   Judgment:   Intact   Orientation:   All  Attention/concentration: Good      Safety Assessment:   Current Safety Concerns:  [place] Suicide Severity Rating Scale (Lifetime/Recent)  [place] Suicide Severity Rating (Lifetime/Recent) 11/23/2021   1. Wish to be Dead (Lifetime) No   1. Wish to be Dead (Recent) No   2. Non-Specific Active Suicidal Thoughts (Lifetime) No   2. Non-Specific Active Suicidal Thoughts (Recent) No   3. Active Suicidal Ideation with any Methods (Not Plan) Without Intent to Act (Lifetime) No   3. Active Suicidal Ideation with any Methods (Not Plan) Without Intent to Act (Recent) No   4. Active Suicidal Ideation with Some Intent to Act, Without Specific Plan (Lifetime) No   4. Active Suicidal Ideation with Some Intent to Act, Without Specific Plan (Recent) No   5. Active Suicidal Ideation with Specific Plan and Intent (Lifetime) No   5. Active Suicidal Ideation with Specific Plan and Intent (Recent) No     Patient denies current homicidal ideation and behaviors.  Patient denies current self-injurious ideation and behaviors.    Patient denied risk behaviors associated with substance use.  Patient denies any high risk behaviors associated with mental health symptoms.  Patient reports the following current concerns for their personal safety: None.  Patient reports there no firearms in the house. denies.     History of Safety Concerns:  Patient denied a history of homicidal ideation.     Patient denied a history of personal safety concerns.    Patient denied a history of assaultive behaviors.    Patient denied a  history of sexual assault behaviors.     Patient denied a history of risk behaviors associated with substance use.  Patient denies any history of high risk behaviors associated with mental health symptoms.  Patient reports the following protective factors: positive relationships positive social network and positive family connections, dedication to family/friends, safe and stable environment, adherence with prescribed medication, living with other people, daily obligations and positive social skills    Risk Plan:  See Preliminary Treatment Plan for Safety and Risk Management Plan    Diagnosis:  Diagnostic Criteria:   Unspecified Anxiety Disorder , Symptoms characteristic of an anxiety disorder that caused clinically significant distress or impairment in social, occupational, or other important areas of functioning predominate but do not meet the full criteria for any of the disorders of the anxiety disorders diagnostic class.    Diagnoses:  1. Anxiety    2. Insomnia, unspecified type        Patient's Strengths and Limitations:  Patient identified the following strengths or resources that will help them succeed in treatment: commitment to health and well being, friends / good social support and family support. Things that may interfere with the patient's success in treatment include: none identified.     Recommendations:     1. Plan for Safety and Risk Management:Recommended that patient call 911 or go to the local ED should there be a change in any of these risk factors..  Report to child / adult protection services was n/a.      2. Resources/Service Plan:       services are not indicated.     Modifications to assist communication are not indicated.     Additional disability accommodations are not indicated.      3. Collaboration:  Collaboration / coordination of treatment will be initiated with the following support professionals: psychiatry.      4.  Referrals:   The following referral(s) will be  initiated: [facility]. Pt is seeing a therapist regularly.        Staff Name/Credentials:  Gianna Arnold on 11/23/2021 at 1:31 PM    November 23, 2021

## 2021-11-23 NOTE — Clinical Note
Alex,    I met with Kaylyn today.  She was quite emotionally distressed, and feels like she is never going to be well again.  She continues to struggle with sleep and anxiety.  She did request an antidepressant, and has done well on Lexapro in the past, but was reluctant to restart it.  We agreed to a trial of mirtazapine to help with sleep, anxiety, and appetite.    Is it possible that her thyroid dysfunction has disregulated her circadian rhythm?    Please feel free to contact me with questions or concerns.  Thank you for the opportunity to be involved in the care of this patient.    Regards,  Danita Leal MD  Collaborative Care Psychiatry  [facility]

## 2021-11-24 NOTE — NURSING NOTE
________________________________  Medications Phoned  to Pharmacy [] yes [x]no  Name of Pharmacist:  List Medications, including dose, quantity and instructions     Medications ordered this visit were e-scribed.  Verified by order class [x] yes  [] no  Mirtazapin 15mg    Medication changes or discontinuations were communicated to patient's pharmacy: [] yes  [x] no     Dictation completed at time of chart check: [x] yes  [] no     I have checked the documentation for today s encounters and the above information has been reviewed and completed.      Farzad Browning MA on November 24, 2021 at 6:48 AM

## 2021-11-29 ENCOUNTER — OFFICE VISIT (OUTPATIENT)
Dept: OTOLARYNGOLOGY | Facility: CLINIC | Age: 56
End: 2021-11-29
Payer: COMMERCIAL

## 2021-11-29 VITALS — HEIGHT: 68 IN | TEMPERATURE: 96.1 F | BODY MASS INDEX: 20.46 KG/M2 | WEIGHT: 135 LBS

## 2021-11-29 DIAGNOSIS — H69.03 PATULOUS EUSTACHIAN TUBE OF BOTH EARS: Primary | ICD-10-CM

## 2021-11-29 PROCEDURE — 99203 OFFICE O/P NEW LOW 30 MIN: CPT | Mod: 25 | Performed by: REGISTERED NURSE

## 2021-11-29 PROCEDURE — 92504 EAR MICROSCOPY EXAMINATION: CPT | Performed by: REGISTERED NURSE

## 2021-11-29 ASSESSMENT — MIFFLIN-ST. JEOR: SCORE: 1250.86

## 2021-11-29 ASSESSMENT — PAIN SCALES - GENERAL: PAINLEVEL: NO PAIN (0)

## 2021-11-29 NOTE — LETTER
11/29/2021       RE: Kaylyn Fischer  [address] [place] [address]     Dear Colleague,    Thank you for referring your patient, Kaylyn Fischer, to the [facility] [place] at [facility]. Please see a copy of my visit note below.      Otolaryngology Clinic  November 29, 2021    Chief Complaint:   Bilateral muffled hearing      History of Present Illness:   Kaylyn Fischer is a 56 year old female who presents today for evaluation of new ear symptoms. Patient has a history of left asymmetric SNHL with tinnitus. Patient wears hearing aid in left ear. MRI completed for this hearing loss was abnormal.    Patient reports that she was doing well until early November after receiving her first dose of Teprotumumab for Graves disease. Patient reported new symptoms of bilateral ear plugging and autophony that occurs daily after being awake for a couple of hours. Symptoms worsen when doing cardio activity. Symptoms improve when she lays down. Patient denies dizziness or nasal congestion. States that she lost a significant amount of weight prior to Graves diagnosis in August of this year.     Past Medical History:  Past Medical History:   Diagnosis Date     Adhesive capsulitis of right shoulder 4/2016 & 9/2016    cortisone injection & manipulation with anesthesia      Genital herpes     Valtrex     Genital warts     Aldara     PMDD (premenstrual dysphoric disorder) 2010    Lexapro     Past Surgical History:  Past Surgical History:   Procedure Laterality Date     COLONOSCOPY N/A 1/29/2016     COLONOSCOPY WITH CO2 INSUFFLATION N/A 1/29/2016    NL, repeat 10 years      ESOPHAGOSCOPY, GASTROSCOPY, DUODENOSCOPY (EGD), COMBINED N/A 8/27/2021    Procedure: ESOPHAGOGASTRODUODENOSCOPY (EGD);  Surgeon: Parvin Guerra MD;  Location:  GI     RETINAL REATTACHMENT       [facility] NONSPECIFIC PROCEDURE  9/2016    manipulation left shoulder   "    Medications:  Current Outpatient Medications   Medication Sig Dispense Refill     Artificial Tear Solution (JUST TEARS EYE DROPS) SOLN Place 1 drop into both eyes 2 times daily 15 mL 3     estradiol (CLIMARA) 0.05 MG/24HR weekly patch APPLY 1 PATCH TOPICALLY TO  SKIN ONCE A WEEK 12 patch 11     HEMP OIL OR EXTRACT OR OTHER CBD CANNABINOID, NOT MEDICAL CANNABIS, CBD Gummies       LORazepam (ATIVAN) 0.5 MG tablet Take 1 tablet (0.5 mg) by mouth nightly as needed for anxiety or sleep 30 tablet 0     methimazole (TAPAZOLE) 5 MG tablet Take 1 tablet (5 mg) by mouth daily 90 tablet 0     mirtazapine (REMERON) 15 MG tablet Take 1 tablet (15 mg) by mouth At Bedtime 30 tablet 1     Selenium 100 MCG CAPS Take 100 mcg by mouth 2 times daily 180 capsule 1     zolpidem (AMBIEN) 5 MG tablet Take 1 tablet (5 mg) by mouth nightly as needed for sleep 30 tablet 0     propranolol (INDERAL) 10 MG tablet Take 1 tablet (10 mg) by mouth 3 times daily (Patient not taking: Reported on 2021) 90 tablet 3     Allergies:  No Known Allergies     Social History:  Social History     Tobacco Use     Smoking status: Former Smoker     Packs/day: 0.50     Years: 20.00     Pack years: 10.00     Types: Cigarettes     Quit date: 2003     Years since quittin.8     Smokeless tobacco: Never Used   Vaping Use     Vaping Use: Never used   Substance Use Topics     Alcohol use: Yes     Comment: for several months no alcohol.  very little now     Drug use: No     Comment: CBD gummies.  doesn't help with sleep like hoped.       ROS: 10 point ROS neg other than the symptoms noted above in the HPI.    Physical Exam:    Temp (!) 96.1  F (35.6  C) (Temporal)   Ht 1.727 m (5' 8\")   Wt 61.2 kg (135 lb)   LMP 2016 (Exact Date)   BMI 20.53 kg/m       Constitutional:  The patient was unaccompanied, well-groomed, and in no acute distress.     Neurologic: Alert and oriented x 3.     Psychiatric: The patient's affect was calm, cooperative, and " appropriate.     Communication:  Normal; communicates verbally, normal voice quality.    Respiratory: Breathing comfortably without stridor or exertion of accessory muscles.       Otologic microscope exam:    Patient's ear pathology required use of the binocular microscope for the purpose of cleaning and improving visualization in order to assure a more accurate diagnostic evaluation.    Right ear was examined under the microscope.  Normal appearing TM, nicely aerated middle ear space. Movement of TM with deep inhalation/exhalation from right nare.     Left ear was also examined under the microscope.  Normal appearing TM, nicely aerated middle ear space. Movement of TM with deep inhalation/exhalation from left nare.        Audiogram: 11/17/2021- data independently reviewed  Right ear: normal hearing   Left ear: Normal sloping to moderate SNHL (stable from 10/16/20 audiogram)   WR right: 96% @ 50 dB left: 55% @ 55 dB   Tympanograms: type A bilaterally         Assessment and Plan:  1. Patulous eustachian tube of both ears  Patient with symptoms and physical exam consistent with bilateral patulous eustachian tube dysfunction.  Previous to visit, I did find a [employer] abstract from the Journal of Endocrine Society that explored the incidence of hearing loss symptoms and sensorineural hearing loss in patients treated with teprotumumab.  Of the 28 patients included in this study, 13 patients complained of hearing symptoms with the most common symptoms being autophony and ear plugging sensation.  1 patient had documented patulous eustachian tube dysfunction. Shared finding of study with patient. Discussed management strategies of PET with patient including hydration and compression stockings. Also discussed that there are topical medications and procedures that can be used/performed to treat PET. Recommend conservative treatment at this time. Patient will finish infusions in March 2022. Will continue to monitor  symptoms at this time.     Follow up if symptoms remain persistent after completion of infusions.     Evangelina Reveles DNP, APRN, CNP  Otolaryngology  Head & Neck Surgery  [phone]    45 minutes spent on the date of the encounter doing chart review, history and exam, documentation and further activities per the note

## 2021-11-29 NOTE — PROGRESS NOTES
Otolaryngology Clinic  November 29, 2021    Chief Complaint:   Bilateral muffled hearing      History of Present Illness:   Kaylyn Fischer is a 56 year old female who presents today for evaluation of new ear symptoms. Patient has a history of left asymmetric SNHL with tinnitus. Patient wears hearing aid in left ear. MRI completed for this hearing loss was abnormal.    Patient reports that she was doing well until early November after receiving her first dose of Teprotumumab for Graves disease. Patient reported new symptoms of bilateral ear plugging and autophony that occurs daily after being awake for a couple of hours. Symptoms worsen when doing cardio activity. Symptoms improve when she lays down. Patient denies dizziness or nasal congestion. States that she lost a significant amount of weight prior to Graves diagnosis in August of this year.     Past Medical History:  Past Medical History:   Diagnosis Date     Adhesive capsulitis of right shoulder 4/2016 & 9/2016    cortisone injection & manipulation with anesthesia      Genital herpes     Valtrex     Genital warts     Aldara     PMDD (premenstrual dysphoric disorder) 2010    Lexapro     Past Surgical History:  Past Surgical History:   Procedure Laterality Date     COLONOSCOPY N/A 1/29/2016     COLONOSCOPY WITH CO2 INSUFFLATION N/A 1/29/2016    NL, repeat 10 years      ESOPHAGOSCOPY, GASTROSCOPY, DUODENOSCOPY (EGD), COMBINED N/A 8/27/2021    Procedure: ESOPHAGOGASTRODUODENOSCOPY (EGD);  Surgeon: Parvin Guerra MD;  Location:  GI     RETINAL REATTACHMENT       [facility] NONSPECIFIC PROCEDURE  9/2016    manipulation left shoulder      Medications:  Current Outpatient Medications   Medication Sig Dispense Refill     Artificial Tear Solution (JUST TEARS EYE DROPS) SOLN Place 1 drop into both eyes 2 times daily 15 mL 3     estradiol (CLIMARA) 0.05 MG/24HR weekly patch APPLY 1 PATCH TOPICALLY TO  SKIN ONCE A WEEK 12 patch 11     HEMP OIL OR EXTRACT OR OTHER CBD  "CANNABINOID, NOT MEDICAL CANNABIS, CBD Gummies       LORazepam (ATIVAN) 0.5 MG tablet Take 1 tablet (0.5 mg) by mouth nightly as needed for anxiety or sleep 30 tablet 0     methimazole (TAPAZOLE) 5 MG tablet Take 1 tablet (5 mg) by mouth daily 90 tablet 0     mirtazapine (REMERON) 15 MG tablet Take 1 tablet (15 mg) by mouth At Bedtime 30 tablet 1     Selenium 100 MCG CAPS Take 100 mcg by mouth 2 times daily 180 capsule 1     zolpidem (AMBIEN) 5 MG tablet Take 1 tablet (5 mg) by mouth nightly as needed for sleep 30 tablet 0     propranolol (INDERAL) 10 MG tablet Take 1 tablet (10 mg) by mouth 3 times daily (Patient not taking: Reported on 2021) 90 tablet 3     Allergies:  No Known Allergies     Social History:  Social History     Tobacco Use     Smoking status: Former Smoker     Packs/day: 0.50     Years: 20.00     Pack years: 10.00     Types: Cigarettes     Quit date: 2003     Years since quittin.8     Smokeless tobacco: Never Used   Vaping Use     Vaping Use: Never used   Substance Use Topics     Alcohol use: Yes     Comment: for several months no alcohol.  very little now     Drug use: No     Comment: CBD gummies.  doesn't help with sleep like hoped.       ROS: 10 point ROS neg other than the symptoms noted above in the HPI.    Physical Exam:    Temp (!) 96.1  F (35.6  C) (Temporal)   Ht 1.727 m (5' 8\")   Wt 61.2 kg (135 lb)   LMP 2016 (Exact Date)   BMI 20.53 kg/m       Constitutional:  The patient was unaccompanied, well-groomed, and in no acute distress.     Neurologic: Alert and oriented x 3.     Psychiatric: The patient's affect was calm, cooperative, and appropriate.     Communication:  Normal; communicates verbally, normal voice quality.    Respiratory: Breathing comfortably without stridor or exertion of accessory muscles.       Otologic microscope exam:    Patient's ear pathology required use of the binocular microscope for the purpose of cleaning and improving visualization in " order to assure a more accurate diagnostic evaluation.    Right ear was examined under the microscope.  Normal appearing TM, nicely aerated middle ear space. Movement of TM with deep inhalation/exhalation from right nare.     Left ear was also examined under the microscope.  Normal appearing TM, nicely aerated middle ear space. Movement of TM with deep inhalation/exhalation from left nare.        Audiogram: 11/17/2021- data independently reviewed  Right ear: normal hearing   Left ear: Normal sloping to moderate SNHL (stable from 10/16/20 audiogram)   WR right: 96% @ 50 dB left: 55% @ 55 dB   Tympanograms: type A bilaterally         Assessment and Plan:  1. Patulous eustachian tube of both ears  Patient with symptoms and physical exam consistent with bilateral patulous eustachian tube dysfunction.  Previous to visit, I did find a [facility] abstract from the Journal of Endocrine Society that explored the incidence of hearing loss symptoms and sensorineural hearing loss in patients treated with teprotumumab.  Of the 28 patients included in this study, 13 patients complained of hearing symptoms with the most common symptoms being autophony and ear plugging sensation.  1 patient had documented patulous eustachian tube dysfunction. Shared finding of study with patient. Discussed management strategies of PET with patient including hydration and compression stockings. Also discussed that there are topical medications and procedures that can be used/performed to treat PET. Recommend conservative treatment at this time. Patient will finish infusions in March 2022. Will continue to monitor symptoms at this time.     Follow up if symptoms remain persistent after completion of infusions.     Evangelina Reveles DNP, APRN, CNP  Otolaryngology  Head & Neck Surgery  [phone]    45 minutes spent on the date of the encounter doing chart review, history and exam, documentation and further activities per the note

## 2021-12-08 ENCOUNTER — LAB (OUTPATIENT)
Dept: LAB | Facility: CLINIC | Age: 56
End: 2021-12-08
Payer: COMMERCIAL

## 2021-12-08 DIAGNOSIS — H57.89 THYROID EYE DISEASE: ICD-10-CM

## 2021-12-08 DIAGNOSIS — E07.9 THYROID EYE DISEASE: ICD-10-CM

## 2021-12-08 DIAGNOSIS — E05.00 GRAVES DISEASE: ICD-10-CM

## 2021-12-08 DIAGNOSIS — Z00.00 HEALTH CARE MAINTENANCE: ICD-10-CM

## 2021-12-08 LAB — T3 SERPL-MCNC: 78 NG/DL (ref 60–181)

## 2021-12-08 PROCEDURE — 84439 ASSAY OF FREE THYROXINE: CPT

## 2021-12-08 PROCEDURE — 84443 ASSAY THYROID STIM HORMONE: CPT

## 2021-12-08 PROCEDURE — 82947 ASSAY GLUCOSE BLOOD QUANT: CPT

## 2021-12-08 PROCEDURE — 80061 LIPID PANEL: CPT

## 2021-12-08 PROCEDURE — 36415 COLL VENOUS BLD VENIPUNCTURE: CPT

## 2021-12-08 PROCEDURE — 84480 ASSAY TRIIODOTHYRONINE (T3): CPT

## 2021-12-09 LAB
FASTING STATUS PATIENT QL REPORTED: YES
GLUCOSE BLD-MCNC: 92 MG/DL (ref 70–99)
T4 FREE SERPL-MCNC: 0.78 NG/DL (ref 0.76–1.46)
TSH SERPL DL<=0.005 MIU/L-ACNC: 0.1 MU/L (ref 0.4–4)

## 2021-12-13 ENCOUNTER — TRANSFERRED RECORDS (OUTPATIENT)
Dept: HEALTH INFORMATION MANAGEMENT | Facility: CLINIC | Age: 56
End: 2021-12-13
Payer: COMMERCIAL

## 2021-12-16 LAB
CHOLEST SERPL-MCNC: 175 MG/DL
FASTING STATUS PATIENT QL REPORTED: YES
HDLC SERPL-MCNC: 70 MG/DL
LDLC SERPL CALC-MCNC: 84 MG/DL
NONHDLC SERPL-MCNC: 105 MG/DL
TRIGL SERPL-MCNC: 105 MG/DL

## 2021-12-22 ENCOUNTER — LAB (OUTPATIENT)
Dept: LAB | Facility: CLINIC | Age: 56
End: 2021-12-22
Payer: COMMERCIAL

## 2021-12-22 DIAGNOSIS — H57.89 THYROID EYE DISEASE: ICD-10-CM

## 2021-12-22 DIAGNOSIS — E07.9 THYROID EYE DISEASE: ICD-10-CM

## 2021-12-22 DIAGNOSIS — E05.00 GRAVES DISEASE: ICD-10-CM

## 2021-12-22 LAB
T3 SERPL-MCNC: 70 NG/DL (ref 60–181)
T4 FREE SERPL-MCNC: 0.79 NG/DL (ref 0.76–1.46)
TSH SERPL DL<=0.005 MIU/L-ACNC: 0.88 MU/L (ref 0.4–4)

## 2021-12-22 PROCEDURE — 84439 ASSAY OF FREE THYROXINE: CPT

## 2021-12-22 PROCEDURE — 36415 COLL VENOUS BLD VENIPUNCTURE: CPT

## 2021-12-22 PROCEDURE — 84480 ASSAY TRIIODOTHYRONINE (T3): CPT

## 2021-12-22 PROCEDURE — 84443 ASSAY THYROID STIM HORMONE: CPT

## 2022-01-09 ENCOUNTER — HEALTH MAINTENANCE LETTER (OUTPATIENT)
Age: 57
End: 2022-01-09

## 2022-01-10 ENCOUNTER — LAB (OUTPATIENT)
Dept: LAB | Facility: CLINIC | Age: 57
End: 2022-01-10
Payer: COMMERCIAL

## 2022-01-10 DIAGNOSIS — E05.00 GRAVES DISEASE: ICD-10-CM

## 2022-01-10 DIAGNOSIS — H57.89 THYROID EYE DISEASE: ICD-10-CM

## 2022-01-10 DIAGNOSIS — E07.9 THYROID EYE DISEASE: ICD-10-CM

## 2022-01-10 LAB
T3 SERPL-MCNC: 88 NG/DL (ref 60–181)
T4 FREE SERPL-MCNC: 0.89 NG/DL (ref 0.76–1.46)
TSH SERPL DL<=0.005 MIU/L-ACNC: 0.77 MU/L (ref 0.4–4)

## 2022-01-10 PROCEDURE — 84443 ASSAY THYROID STIM HORMONE: CPT

## 2022-01-10 PROCEDURE — 84439 ASSAY OF FREE THYROXINE: CPT

## 2022-01-10 PROCEDURE — 36415 COLL VENOUS BLD VENIPUNCTURE: CPT

## 2022-01-10 PROCEDURE — 84480 ASSAY TRIIODOTHYRONINE (T3): CPT

## 2022-01-10 ASSESSMENT — ANXIETY QUESTIONNAIRES
GAD7 TOTAL SCORE: 14
7. FEELING AFRAID AS IF SOMETHING AWFUL MIGHT HAPPEN: MORE THAN HALF THE DAYS
2. NOT BEING ABLE TO STOP OR CONTROL WORRYING: MORE THAN HALF THE DAYS
4. TROUBLE RELAXING: MORE THAN HALF THE DAYS
6. BECOMING EASILY ANNOYED OR IRRITABLE: MORE THAN HALF THE DAYS
GAD7 TOTAL SCORE: 14
GAD7 TOTAL SCORE: 14
1. FEELING NERVOUS, ANXIOUS, OR ON EDGE: MORE THAN HALF THE DAYS
5. BEING SO RESTLESS THAT IT IS HARD TO SIT STILL: MORE THAN HALF THE DAYS
1. FEELING NERVOUS, ANXIOUS, OR ON EDGE: MORE THAN HALF THE DAYS
2. NOT BEING ABLE TO STOP OR CONTROL WORRYING: MORE THAN HALF THE DAYS
7. FEELING AFRAID AS IF SOMETHING AWFUL MIGHT HAPPEN: MORE THAN HALF THE DAYS
3. WORRYING TOO MUCH ABOUT DIFFERENT THINGS: MORE THAN HALF THE DAYS
4. TROUBLE RELAXING: MORE THAN HALF THE DAYS
5. BEING SO RESTLESS THAT IT IS HARD TO SIT STILL: MORE THAN HALF THE DAYS
3. WORRYING TOO MUCH ABOUT DIFFERENT THINGS: MORE THAN HALF THE DAYS
6. BECOMING EASILY ANNOYED OR IRRITABLE: MORE THAN HALF THE DAYS
7. FEELING AFRAID AS IF SOMETHING AWFUL MIGHT HAPPEN: MORE THAN HALF THE DAYS
GAD7 TOTAL SCORE: 14

## 2022-01-11 ASSESSMENT — ANXIETY QUESTIONNAIRES
GAD7 TOTAL SCORE: 14
GAD7 TOTAL SCORE: 14

## 2022-01-12 DIAGNOSIS — G47.00 INSOMNIA, UNSPECIFIED TYPE: ICD-10-CM

## 2022-01-12 RX ORDER — METHIMAZOLE 5 MG/1
2.5 TABLET ORAL EVERY OTHER DAY
Qty: 30 TABLET | Refills: 1 | Status: SHIPPED | OUTPATIENT
Start: 2022-01-12 | End: 2022-09-02

## 2022-01-12 RX ORDER — MIRTAZAPINE 15 MG/1
15 TABLET, FILM COATED ORAL AT BEDTIME
Qty: 30 TABLET | Refills: 1 | OUTPATIENT
Start: 2022-01-12

## 2022-01-12 NOTE — TELEPHONE ENCOUNTER
Medication requested: mirtazapine (REMERON) 15 MG tablet Date last ordered: 11/23/21 Qty: 30 Refills: 1     Deny-RTS. Has appointment tomorrow and refill will be completed then

## 2022-01-13 ENCOUNTER — VIRTUAL VISIT (OUTPATIENT)
Dept: BEHAVIORAL HEALTH | Facility: CLINIC | Age: 57
End: 2022-01-13
Payer: COMMERCIAL

## 2022-01-13 ENCOUNTER — VIRTUAL VISIT (OUTPATIENT)
Dept: PSYCHIATRY | Facility: CLINIC | Age: 57
End: 2022-01-13
Payer: COMMERCIAL

## 2022-01-13 VITALS — WEIGHT: 130 LBS | BODY MASS INDEX: 19.77 KG/M2

## 2022-01-13 DIAGNOSIS — F41.9 ANXIETY: Primary | ICD-10-CM

## 2022-01-13 DIAGNOSIS — G47.00 INSOMNIA, UNSPECIFIED TYPE: ICD-10-CM

## 2022-01-13 DIAGNOSIS — F41.9 ANXIETY DISORDER, UNSPECIFIED TYPE: Primary | ICD-10-CM

## 2022-01-13 PROCEDURE — 99215 OFFICE O/P EST HI 40 MIN: CPT | Mod: 95 | Performed by: PSYCHIATRY & NEUROLOGY

## 2022-01-13 PROCEDURE — 90832 PSYTX W PT 30 MINUTES: CPT | Mod: 95 | Performed by: SOCIAL WORKER

## 2022-01-13 RX ORDER — MIRTAZAPINE 15 MG/1
15 TABLET, FILM COATED ORAL AT BEDTIME
Qty: 90 TABLET | Refills: 0 | Status: SHIPPED | OUTPATIENT
Start: 2022-01-13 | End: 2022-04-28

## 2022-01-13 RX ORDER — ESCITALOPRAM OXALATE 10 MG/1
TABLET ORAL
Qty: 90 TABLET | Refills: 1 | Status: SHIPPED | OUTPATIENT
Start: 2022-01-13 | End: 2022-04-28

## 2022-01-13 ASSESSMENT — ANXIETY QUESTIONNAIRES
7. FEELING AFRAID AS IF SOMETHING AWFUL MIGHT HAPPEN: MORE THAN HALF THE DAYS
GAD7 TOTAL SCORE: 14
5. BEING SO RESTLESS THAT IT IS HARD TO SIT STILL: MORE THAN HALF THE DAYS
6. BECOMING EASILY ANNOYED OR IRRITABLE: MORE THAN HALF THE DAYS
3. WORRYING TOO MUCH ABOUT DIFFERENT THINGS: MORE THAN HALF THE DAYS
1. FEELING NERVOUS, ANXIOUS, OR ON EDGE: MORE THAN HALF THE DAYS
4. TROUBLE RELAXING: MORE THAN HALF THE DAYS
2. NOT BEING ABLE TO STOP OR CONTROL WORRYING: MORE THAN HALF THE DAYS
7. FEELING AFRAID AS IF SOMETHING AWFUL MIGHT HAPPEN: MORE THAN HALF THE DAYS

## 2022-01-13 ASSESSMENT — PATIENT HEALTH QUESTIONNAIRE - PHQ9
SUM OF ALL RESPONSES TO PHQ QUESTIONS 1-9: 9
10. IF YOU CHECKED OFF ANY PROBLEMS, HOW DIFFICULT HAVE THESE PROBLEMS MADE IT FOR YOU TO DO YOUR WORK, TAKE CARE OF THINGS AT HOME, OR GET ALONG WITH OTHER PEOPLE: SOMEWHAT DIFFICULT
SUM OF ALL RESPONSES TO PHQ QUESTIONS 1-9: 9
10. IF YOU CHECKED OFF ANY PROBLEMS, HOW DIFFICULT HAVE THESE PROBLEMS MADE IT FOR YOU TO DO YOUR WORK, TAKE CARE OF THINGS AT HOME, OR GET ALONG WITH OTHER PEOPLE: SOMEWHAT DIFFICULT
SUM OF ALL RESPONSES TO PHQ QUESTIONS 1-9: 9
SUM OF ALL RESPONSES TO PHQ QUESTIONS 1-9: 9

## 2022-01-13 NOTE — PROGRESS NOTES
Telemedicine Visit: The patient's condition can be safely assessed and treated via synchronous audio and visual telemedicine encounter.      Reason for Telemedicine Visit: Services only offered telehealth    Originating Site (Patient Location): Patient's home    Distant Site (Provider Location): Provider Remote Setting- Home Office    Consent:  The patient/guardian has verbally consented to: the potential risks and benefits of telemedicine (video visit) versus in person care; bill my insurance or make self-payment for services provided; and responsibility for payment of non-covered services.     Mode of Communication:  Video Conference via [employer]    As the provider I attest to compliance with applicable laws and regulations related to telemedicine.    Kaylyn is a 56 year old who is being evaluated via a billable video visit.      How would you like to obtain your AVS? MyChart  If the video visit is dropped, the invitation should be resent by: Text to cell phone: [phone]  Will anyone else be joining your video visit? No           Outpatient Psychiatric Progress Note    Name: Kaylyn Fischer   : 1965                    Primary Care Provider: Alberto Gold MD   Therapist: Gloria Lizarraga, private practice      PHQ-9 scores:  PHQ-9 SCORE 2021   PHQ-9 Total Score - - -   PHQ-9 Total Score MyChart 10 (Moderate depression) - 9 (Mild depression)   PHQ-9 Total Score 10 9 9   Some encounter information is confidential and restricted. Go to Review Flowsheets activity to see all data.       FIFI-7 scores:  FIFI-7 SCORE 1/10/2022 1/10/2022 2022   Total Score - - -   Total Score - 14 (moderate anxiety) 14 (moderate anxiety)   Total Score 14 14 14       Patient Identification:  Kaylyn is a 56 year old year old female  who presents for return visit with me.  Patient attended the session alone.     Interim History:  Per Gianna Arnold, LICSW:  Pt shares that she did really well for about  "1 month after her previous visit. Shares that the mirtazapine got her sleeping and she felt great for a month or so. Feels that she is now not sleeping as well. Has increased her ambien the past few weeks which has been helpful. Tries to sleep without the ambien but if can't fall asleep or wakes up early in the night she will take it.  Would like to talk to Dr. German with adding lexapro possibly given that it has worked for her in the past.  Got back into a good sleep routine for a bit going to be and reading but she shares that last week her thyroid levels got \"all out of whack\" and She had 2 panic attacks which she shares that very scary. Is trying not to focus on those feelings and tries to use breathing techniques and anchoring she has learned from therapist. During panic attacks her heart was racing, breathing was fast and had so much worry. Does have ativan PRN but she shares that she was told by her PCP not to take ativan if she is taking ambien. Shares that after she has had a panic attack she worries about having another one.  [place] [facility] [place]. [place] with friends was cancelled due to friends having COVID. Drove to [place] to see son which caused her a lot of anxiety as she has not seen him in over 1 year. Shares that she [place].  Continues to see therapist as needed. Work is going ok and was in the office last week to see a co-worker. It is a slower time of year which is kind of nice.    Kaylyn reports that although she initially seem to be doing well when we started mirtazapine, her anxiety has increased significantly, and her mood is quite low.  She has a hard time rating her mood, but says that she is spending \"way too much time in a state of half sleep, watching TV to escape.\"  She rates her anxiety today as 8 or 9 out of 10 with 10 being the best.  She finds it very difficult to be alone.  She denies any " "suicidal thoughts, and has hope that things will get better.    She had 2 panic attacks recently, one on Thursday, and another on Saturday.  She has not had any before or since, but she is extremely concerned that they will happen again.  We discussed the risks and benefits of the restarting Lexapro.  She did not have any adverse side effects while she was on it for several years, but stopped it 3 years ago.  She was prescribed 20 mg daily, but reports that she never took more than 10 mg.  She will start with 5 mg daily for approximately a week then increase to 10 mg daily.    We agreed to continue her mirtazapine even though she does not feel that it is helping as much now as it did initially.  She has had to take Ambien to help with sleep again.  Initially the mirtazapine increased her appetite and caused grogginess, but those have resolved.  Her weight has remained stable.    We discussed her belief that her thyroid levels are \"out of whack.\"  She has been adjusting her Tapazole dose from day today based on her mood because she believes that her thyroid levels are the underlying cause of her mood fluctuations.  That belief was challenged.    Vital Signs:   No vital signs taken for this encounter.    Wt 59 kg (130 lb)   LMP 11/19/2016 (Exact Date)   BMI 19.77 kg/m      Current Medications:  Current Outpatient Medications   Medication     Artificial Tear Solution (JUST TEARS EYE DROPS) SOLN     escitalopram (LEXAPRO) 10 MG tablet     estradiol (CLIMARA) 0.05 MG/24HR weekly patch     HEMP OIL OR EXTRACT OR OTHER CBD CANNABINOID, NOT MEDICAL CANNABIS,     LORazepam (ATIVAN) 0.5 MG tablet     methimazole (TAPAZOLE) 5 MG tablet     mirtazapine (REMERON) 15 MG tablet     propranolol (INDERAL) 10 MG tablet     Selenium 100 MCG CAPS     zolpidem (AMBIEN) 5 MG tablet     No current facility-administered medications for this visit.        The [place] Prescription Monitoring Program has been reviewed and there are no " concerns about diversionary activity for controlled substances at this time.      Mental Status Examination:  Kaylyn is a 56-year-old woman in some emotional distress.  She is tearful on and off throughout the appointment.  Speech is clear and normal in rate and tone.  Mood is dysphoric and anxious.  Thoughts are logical and spontaneous with no loose associations or flight of ideas.  Thought content shows no psychosis.  No suicidal thoughts.  She is alert and oriented x3.    Assessment and Plan:    ICD-10-CM    1. Anxiety  F41.9 escitalopram (LEXAPRO) 10 MG tablet   2. Insomnia, unspecified type  G47.00 mirtazapine (REMERON) 15 MG tablet       Medical comorbidities include:   Patient Active Problem List   Diagnosis     Herpes simplex vulvovaginitis     Adhesive capsulitis of left shoulder     Graves disease     Thyroid eye disease     Insomnia, unspecified type     Anxiety     Premenstrual dysphoric disorder in remission       Treatment Plan:  Patient Instructions   Continue zolpidem 5 mg at bedtime.  You may try increasing the dose to 10 mg at bedtime or 5 mg at bedtime and an addition 2.5 to 5 mg when you awake in the night.     Continue mirtazapine 15 mg at bedtime.    Restart escitalopram (Lexapro) 5 mg daily for 6 days, then 10 mg daily.     Continue all other medications per your primary care provider.    Schedule an appointment with me in 6 weeks or sooner as needed.  You may call [facility] at [phone] to schedule.    [place] Resources:      Go to the Emergency Department as needed or call after hours crisis line at [phone].      To schedule individual or family therapy, call [facility] at [phone].     Follow up with primary care provider as planned or sooner for acute medical concerns.    Call the psychiatric nurse line with medication questions or concerns at [phone].    VocalizeLocalhart may be used to communicate with your provider, but this is not  intended to be used for emergencies.    Community Resources:      National Suicide Prevention Lifeline: 635.286.1769 (TTY: 622.817.5671). Call anytime for help.  (www.suicidepreventionlifeline.org)    National [place] on [place]ness (www.nathan.org): 816.110.1010 or 532-834-5045.     Mental Health Association (www.mentalhealth.org): 817.820.7373 or 809-742-6727.    [place] Crisis Text Line: Text [place] to 129789    Suicide LifeLine Chat: suicideLogicbroker.org/chat         Administrative Billing:   Kaylyn was scheduled for a video visit.  Unfortunately, we were both in virtual waiting rooms, but could not see when another.  In the interest of time, we agreed to meet by telephone.    Telephone call duration: 34 minutes  Total time spent, including chart review and documentation: 45 minutes    Patient Status:  Patient will continue to be seen for ongoing consultation and stabilization.

## 2022-01-13 NOTE — PROGRESS NOTES
Collaborative Care Psychiatry Service (CCPS)  January 13, 2022    Behavioral Health Clinician Progress Note    Patient Name: Kaylyn Fischer      Telemedicine Visit: The patient's condition can be safely assessed and treated via synchronous audio and visual telemedicine encounter.      Reason for Telemedicine Visit: Services only offered telehealth    Originating Site (Patient Location): Patient's home    Distant Site (Provider Location): Provider Remote Setting- Home Office    Consent:  The patient/guardian has verbally consented to: the potential risks and benefits of telemedicine (video visit) versus in person care; bill my insurance or make self-payment for services provided; and responsibility for payment of non-covered services.     Mode of Communication:  Video Conference via [employer]    As the provider I attest to compliance with applicable laws and regulations related to telemedicine.         Service Type:  Individual      Service Location:   Got[employer] / Email (patient reached)     Session Start Time: 1:08p  Session End Time: 1:35p      Session Length: 16 - 37      Attendees: Client    Visit Activities (Refresh list every visit): [facility] Only    Diagnostic Assessment Date: 11/23/21  See Flowsheets for today's PHQ-9 and FIFI-7 results  Previous PHQ-9:   PHQ-9 SCORE 11/22/2021 1/13/2022 1/13/2022   PHQ-9 Total Score - - -   [facility] (Moderate [facility]) - 9 (Mild depression)   PHQ-9 Total Score 10 9 9   Some encounter information is confidential and restricted. Go to Review Flowsheets activity to see all data.       Previous FIFI-7:   FIFI-7 SCORE 1/10/2022 1/10/2022 1/13/2022   Total Score - - -   Total Score - 14 (moderate anxiety) 14 (moderate anxiety)   Total Score 14 14 14       WHODAS  WHODAS 2.0 Total Score 1/10/2022 1/13/2022   Total Score 21 21   Total Score MyChart - 21        CAGE  CAGE-AID Flowsheet 11/23/2021 1/10/2022 1/13/2022   Have you ever felt you should Cut down on your  "drinking or drug use? 0 0 0   Have people Annoyed you by criticizing your drinking or drug use? 0 0 0   Have you ever felt bad or Guilty about your drinking or drug use? 0 0 0   Have you ever had a drink or used drugs first thing in the morning to steady your nerves or to get rid of a hangover? (Eye opener) 0 0 0   CAGE-AID SCORE 0 0 0   1. Have you felt you ought to cut down on your drinking or drug use? No - No   2. Have people annoyed you by criticizing your drinking or drug use? No - No   3. Have you felt bad or guilty about your drinking or drug use? No - No   4. Have you ever had a drink or used drugs first thing in the morning to steady your nerves or to get rid of a hangover (eye opener)? No - No   CAGE-AID SCORE 0 (A total score of 2 or greater is considered clinically significant) - 0 (A total score of 2 or greater is considered clinically significant)         DATA  Extended Session (60+ minutes): No  Interactive Complexity: No  Crisis: No    Medication Compliance:  Yes      Chemical Use Review:   Substance Use: Chemical use reviewed, no active concerns identified      Tobacco Use: No current tobacco use.      Current Stressors / Issues:  Pt shares that she did really well for about 1 month after her previous visit.  Shares that the mirtazapine got her sleeping and she felt great for a month or so.  Feels that she is now not sleeping as well.  Has increased her ambien the past few weeks which has been helpful.  Tries to sleep without the ambien but if can't fall asleep or wakes up early in the night she will take it.    Would like to talk to Dr. German with adding lexapro possibly given that it has worked for her in the past.  Got back into a good sleep routine for a bit going to be and reading but she shares that last week her thyroid levels got \"all out of whack\" and  She had 2 panic attacks which she shares that very scary.  Is trying not to focus on those feelings and tries to use breathing " "techniques and anchoring she has learned from therapist.  During panic attacks her heart was racing, breathing was fast and had so much worry.  Does have ativan PRN but she shares that she was told by her PCP not to take ativan if she is taking ambien.  Shares that after she has had a panic attack she worries about having another one.  Had a great trip over Thanksgiving with her sister.  [place] with friends was cancelled due to friends having COVID.  Drove to [place] to see son which caused her a lot of anxiety as she has not seen him in over 1 year. Shares that she had a nice [place].    Continues to see therapist as needed.  Work is going ok and was in the office last week to see a co-worker.  It is a slower time of year which is kind of nice.          Pt shares that she was dx'd with Graves Disease in August. She shares that she now has medication for her thyroid but since August she has had a lot of issues with insomnia and anxiety.  Feels that her anxiety is related to insomnia and poor sleep.    Is prescribed ativan by her provider which helps a bit but not with sleep so was also prescribed ambien.  She shares the the ambien puts her to sleep but she doesn't stay sleeping and wakes often in the night and has a hard time falling back asleep.  Is noticing the lack of sleep and anxiety are affecting her work productivity.  With her high anxiety is noticing she is a little restless and is wanting to do more and be around people more but does have a lack of confidence and doubts herself and her abilities.    Pt lives with her wife and share that she has a good support system.  Pt has one son who recently moved to [place] for college which pt shares has been hard on her anxiety a bit as she misses him and is having a hard time adjusting to \"an empty nest\".  Is leaving today to drive to [place] to visit her sister for the Thanksgiving holiday.  Pt denies and SI.  Pt recently " started seeing a therapist over the past couple of months.        Progress on Treatment Objective(s) / Homework:  Minimal progress - ACTION (Actively working towards change); Intervened by reinforcing change plan / affirming steps taken    Motivational Interviewing    MI Intervention: Expressed Empathy/Understanding, Supported Autonomy, Collaboration, Evocation, Permission to raise concern or advise and Open-ended questions     Change Talk Expressed by the Patient: Committment to change Activation Taking steps    Provider Response to Change Talk: E - Evoked more info from patient about behavior change, A - Affirmed patient's thoughts, decisions, or attempts at behavior change, R - Reflected patient's change talk and S - Summarized patient's change talk statements        Review of Symptoms per patient report:  Depression: Change in sleep, Lack of interest, Change in energy level and Feeling sad, down, or depressed  Linsey:  No Symptoms  Psychosis: No Symptoms  Anxiety: Excessive worry, Nervousness and Social anxiety, knot in stomach, doesn't like to be alone  Panic:  Palpitations, Tremors and Sense of impending doom- had 2 panic attacks last week.  Post Traumatic Stress Disorder:  No Symptoms   Eating Disorder: No Symptoms  ADD / ADHD:  No symptoms  Conduct Disorder: No symptoms  Autism Spectrum Disorder: No symptoms  Obsessive Compulsive Disorder: No Symptoms      Changes in Health Issues:   Yes: graves disease.  Believes that her thyroid levels affect her mood quite a bit.    Assessment: Current Emotional / Mental Status (status of significant symptoms):  Risk status (Self / Other harm or suicidal ideation)  Patient denies a history of suicidal ideation, suicide attempts, self-injurious behavior, homicidal ideation, homicidal behavior and and other safety concerns  Patient denies current fears or concerns for personal safety.  Patient denies current or recent suicidal ideation or behaviors.  Patient denies current  or recent homicidal ideation or behaviors.  Patient denies current or recent self injurious behavior or ideation.  Patient denies other safety concerns.  A safety and risk management plan has not been developed at this time, however patient was encouraged to call [place] [phone] should there be a change in any of these risk factors.    Appearance:   Appropriate   Eye Contact:   Good   Psychomotor Behavior: Normal   Attitude:   Cooperative   Orientation:   All  Speech   Rate / Production: Normal    Volume:  Normal   Mood:    Anxious   Affect:    Appropriate   Thought Content:  Clear   Thought Form:  Coherent  Logical   Insight:    Good     Diagnoses:  1. Anxiety disorder, unspecified type    2. Insomnia, unspecified type        Collateral Reports Completed:  Communicated with: CCPS provider    Plan: (Homework, other):  Patient was given information about behavioral services and encouraged to schedule a follow up appointment with the clinic [facility] in conjunction with next CCPS appointment.  She was also given information about mental health symptoms and treatment options .  Has an individual therapist she is seeing regularly.  CD Recommendations: No indications of CD issues.  JUVENCIO Duncan, [facility]      JUVENCIO Hernandez, [facility]  January 13, 2022

## 2022-01-13 NOTE — PATIENT INSTRUCTIONS
Continue zolpidem 5 mg at bedtime.  You may try increasing the dose to 10 mg at bedtime or 5 mg at bedtime and an addition 2.5 to 5 mg when you awake in the night.     Continue mirtazapine 15 mg at bedtime.    Restart escitalopram (Lexapro) 5 mg daily for 6 days, then 10 mg daily.     Continue all other medications per your primary care provider.    Schedule an appointment with me in 6 weeks or sooner as needed.  You may call [facility] at [phone] to schedule.    [place] Resources:      Go to the Emergency Department as needed or call after hours crisis line at [phone].      To schedule individual or family therapy, call [facility] at [phone].     Follow up with primary care provider as planned or sooner for acute medical concerns.    Call the psychiatric nurse line with medication questions or concerns at [phone].    JumpCloudhart may be used to communicate with your provider, but this is not intended to be used for emergencies.    Community Resources:      National Suicide Prevention Lifeline: 435.110.7126 (TTY: 759.112.8793). Call anytime for help.  (www.suicidepreventionlifeline.org)    National Tina on Mental Illness (www.nathan.org): 202.479.7413 or 551-682-3572.     Mental Health Association (www.mentalhealth.org): 846.782.1728 or 851-008-3439.    [place] Crisis Text Line: Text [place] to 618781    Suicide LifeLine Chat: suicidepreventionlifeline.org/chat

## 2022-01-14 ASSESSMENT — PATIENT HEALTH QUESTIONNAIRE - PHQ9
SUM OF ALL RESPONSES TO PHQ QUESTIONS 1-9: 9
SUM OF ALL RESPONSES TO PHQ QUESTIONS 1-9: 9

## 2022-01-17 NOTE — NURSING NOTE
Medications Phoned  to Pharmacy [] yes [x]no  Name of Pharmacist:  List Medications, including dose, quantity and instructions     Medications ordered this visit were e-scribed.  Verified by order class [x] yes  [] no  Escitalopram 10mg  Mirtazapine 15mg    Medication changes or discontinuations were communicated to patient's pharmacy: [] yes  [x] no     Dictation completed at time of chart check: [x] yes  [] no     I have checked the documentation for today s encounters and the above information has been reviewed and completed.        Farzad Browning MA January 17, 2022 9:26 AM

## 2022-01-24 ENCOUNTER — TRANSFERRED RECORDS (OUTPATIENT)
Dept: HEALTH INFORMATION MANAGEMENT | Facility: CLINIC | Age: 57
End: 2022-01-24
Payer: COMMERCIAL

## 2022-02-14 ENCOUNTER — MEDICAL CORRESPONDENCE (OUTPATIENT)
Dept: HEALTH INFORMATION MANAGEMENT | Facility: CLINIC | Age: 57
End: 2022-02-14
Payer: COMMERCIAL

## 2022-03-04 ENCOUNTER — LAB (OUTPATIENT)
Dept: LAB | Facility: CLINIC | Age: 57
End: 2022-03-04
Payer: COMMERCIAL

## 2022-03-04 ENCOUNTER — OFFICE VISIT (OUTPATIENT)
Dept: ENDOCRINOLOGY | Facility: CLINIC | Age: 57
End: 2022-03-04
Payer: COMMERCIAL

## 2022-03-04 VITALS
HEIGHT: 68 IN | WEIGHT: 138.8 LBS | BODY MASS INDEX: 21.04 KG/M2 | DIASTOLIC BLOOD PRESSURE: 77 MMHG | HEART RATE: 73 BPM | SYSTOLIC BLOOD PRESSURE: 126 MMHG

## 2022-03-04 DIAGNOSIS — E05.00 GRAVES DISEASE: Primary | ICD-10-CM

## 2022-03-04 DIAGNOSIS — E05.00 GRAVES DISEASE: ICD-10-CM

## 2022-03-04 DIAGNOSIS — H57.89 THYROID EYE DISEASE: ICD-10-CM

## 2022-03-04 DIAGNOSIS — E07.9 THYROID EYE DISEASE: ICD-10-CM

## 2022-03-04 LAB
T3 SERPL-MCNC: 84 NG/DL (ref 60–181)
T4 FREE SERPL-MCNC: 0.69 NG/DL (ref 0.76–1.46)
TSH SERPL DL<=0.005 MIU/L-ACNC: 0.55 MU/L (ref 0.4–4)

## 2022-03-04 PROCEDURE — 84439 ASSAY OF FREE THYROXINE: CPT | Performed by: PATHOLOGY

## 2022-03-04 PROCEDURE — 36415 COLL VENOUS BLD VENIPUNCTURE: CPT | Performed by: PATHOLOGY

## 2022-03-04 PROCEDURE — 84480 ASSAY TRIIODOTHYRONINE (T3): CPT | Performed by: INTERNAL MEDICINE

## 2022-03-04 PROCEDURE — 99214 OFFICE O/P EST MOD 30 MIN: CPT | Performed by: INTERNAL MEDICINE

## 2022-03-04 PROCEDURE — 84443 ASSAY THYROID STIM HORMONE: CPT | Performed by: PATHOLOGY

## 2022-03-04 ASSESSMENT — PAIN SCALES - GENERAL: PAINLEVEL: NO PAIN (0)

## 2022-03-04 NOTE — LETTER
3/4/2022       RE: Kaylyn Fischer  [address] [place] [address]     Dear Colleague,    Thank you for referring your patient, Kaylyn Fischer, to the [facility] [place] at [facility]. Please see a copy of my visit note below.    graves disease  Parish Garibay, [facility]      Endocrinology and Diabetes Clinic Return Visit    Subjective:   Kaylyn Fischer is a 56 year old woman seen today for a follow up visit for hyperthyroidism due to Graves Disease.     She was diagnosed with hyperthyroidism in August 2021, and diagnosed with Graves disease after initial visit in this clinic based on elevated TSI (3.3, normal range less than 1.3). She had received recent IV contract and GOLDEN uptake and scan was not possible. Treatment with methimazole was initiated. She also has RAMAN and follows with ophthalmology, has been treated with Tepezza.     Interval History:   Treatment with Tepezza is almost complete. This has been helpful.  She is feeling well today.   She decreased methimazole to 2.5 mg every other other day at my instruction, then stopped entirely on her own about 6 weeks ago.   She continues to have more bowel movements than prior to her diagnosis.   No tremor.    No palpitations.   No temperature intolerance.   She lost 30 lbs with hyperthyroidism, with subsequent regain but still < baseline.   Mood has stabilized. She follows with psychiatry. Improved thyroid hormone levels has helped. I do note from past mental health notes that she had been self-adjusting methimazole dose based on mood.   Insomnia improved, she is not currently needing medication for this.   She notes some hearing changes with Tepezza.     Current Outpatient Medications   Medication Instructions     Artificial Tear Solution (JUST TEARS EYE DROPS) SOLN 1 drop, Both Eyes, 2 TIMES DAILY     escitalopram (LEXAPRO) 10 MG tablet Take 5 mg daily for 6 days, then  "increase to 10 mg daily.     estradiol (CLIMARA) 0.05 MG/24HR weekly patch APPLY 1 PATCH TOPICALLY TO  SKIN ONCE A WEEK     methimazole (TAPAZOLE) 2.5 mg, Oral, EVERY OTHER DAY     mirtazapine (REMERON) 15 mg, Oral, AT BEDTIME     Selenium 100 mcg, Oral, 2 TIMES DAILY     zolpidem (AMBIEN) 5 mg, Oral, AT BEDTIME PRN     Social History     Tobacco Use     Smoking status: Former Smoker     Packs/day: 0.50     Years: 20.00     Pack years: 10.00     Types: Cigarettes     Quit date: 2003     Years since quittin.0     Smokeless tobacco: Never Used   Substance Use Topics     Alcohol use: Yes     Comment: for several months no alcohol.  very little now       Review of Systems: A comprehensive ROS was negative except as noted in HPI.     Physical Examination:  Estimated body mass index is 21.1 kg/m  as calculated from the following:    Height as of this encounter: 1.727 m (5' 8\").    Weight as of this encounter: 63 kg (138 lb 12.8 oz).    Wt Readings from Last 4 Encounters:   22 63 kg (138 lb 12.8 oz)   22 59 kg (130 lb)   21 61.2 kg (135 lb)   21 58.5 kg (129 lb)     Blood pressure 126/77, pulse 73, height 1.727 m (5' 8\"), weight 63 kg (138 lb 12.8 oz), last menstrual period 2016, not currently breastfeeding.    General: Well appearing and in no distress.   Eyes: EOMI. Exophthalmos left > right.   HENT: Thyroid gland is not enlarged, and I do not palpate any nodules or masses.   Lympathic: No cervical lymphadenopathy.   CV: RRR, with no murmur.  Respiratory: Lungs CTA bilaterally.   Extremities: No peripheral edema.   Skin: Skin temperature is normal.  Neurologic: Patellar reflexes are 2+ with normal relaxation phase. No tremor with outstretched hands.     Lab Data:   Component      Latest Ref Rng & Units 3/4/2022   TSH      0.40 - 4.00 mU/L 0.55   T4 Free      0.76 - 1.46 ng/dL 0.69 (L)   Triiodothyronine (T3)      60 - 181 ng/dL 84       Assessment and Plan:    Hyperthyroidism " secondary to Graves Disease   -She is not currently taking methimazole and TSH is in normal range, with mildly low free T4.   -Follow TFTs every 2 months at a minimum. Standing order in place.     Thyroid eye disease   -Following with ophthalmology and has almost completed treatment with Tepezza, with improvement. This therapy has been complicated by mild hearing changes, which are being monitored. Blood sugars were not significantly elevated.   -Lubricating eye drops  -Taking selenium, advised to continue for 6 months total, then stop  -She quite smoking after onset of Graves disease.     Anxiety and insomnia  -Symptoms have improved. She is following with psychiatry.     Follow up in clinic in 6 months.       Casandra Yousif MD   Diabetes and Endocrinology   [phone]

## 2022-03-04 NOTE — PROGRESS NOTES
graves disease  Parish Garibay, [facility]      Endocrinology and Diabetes Clinic Return Visit    Subjective:   Kaylyn Fischer is a 56 year old woman seen today for a follow up visit for hyperthyroidism due to Graves Disease.     She was diagnosed with hyperthyroidism in August 2021, and diagnosed with Graves disease after initial visit in this clinic based on elevated TSI (3.3, normal range less than 1.3). She had received recent IV contract and GOLDEN uptake and scan was not possible. Treatment with methimazole was initiated. She also has RAMAN and follows with ophthalmology, has been treated with Tepezza.     Interval History:   Treatment with Tepezza is almost complete. This has been helpful.  She is feeling well today.   She decreased methimazole to 2.5 mg every other other day at my instruction, then stopped entirely on her own about 6 weeks ago.   She continues to have more bowel movements than prior to her diagnosis.   No tremor.    No palpitations.   No temperature intolerance.   She lost 30 lbs with hyperthyroidism, with subsequent regain but still < baseline.   Mood has stabilized. She follows with psychiatry. Improved thyroid hormone levels has helped. I do note from past mental health notes that she had been self-adjusting methimazole dose based on mood.   Insomnia improved, she is not currently needing medication for this.   She notes some hearing changes with Tepezza.     Current Outpatient Medications   Medication Instructions     Artificial Tear Solution (JUST TEARS EYE DROPS) SOLN 1 drop, Both Eyes, 2 TIMES DAILY     escitalopram (LEXAPRO) 10 MG tablet Take 5 mg daily for 6 days, then increase to 10 mg daily.     estradiol (CLIMARA) 0.05 MG/24HR weekly patch APPLY 1 PATCH TOPICALLY TO  SKIN ONCE A WEEK     methimazole (TAPAZOLE) 2.5 mg, Oral, EVERY OTHER DAY     mirtazapine (REMERON) 15 mg, Oral, AT BEDTIME     Selenium 100 mcg, Oral, 2 TIMES DAILY     zolpidem (AMBIEN) 5 mg, Oral, AT BEDTIME PRN     Social  "History     Tobacco Use     Smoking status: Former Smoker     Packs/day: 0.50     Years: 20.00     Pack years: 10.00     Types: Cigarettes     Quit date: 2003     Years since quittin.0     Smokeless tobacco: Never Used   Substance Use Topics     Alcohol use: Yes     Comment: for several months no alcohol.  very little now       Review of Systems: A comprehensive ROS was negative except as noted in HPI.     Physical Examination:  Estimated body mass index is 21.1 kg/m  as calculated from the following:    Height as of this encounter: 1.727 m (5' 8\").    Weight as of this encounter: 63 kg (138 lb 12.8 oz).    Wt Readings from Last 4 Encounters:   22 63 kg (138 lb 12.8 oz)   22 59 kg (130 lb)   21 61.2 kg (135 lb)   21 58.5 kg (129 lb)     Blood pressure 126/77, pulse 73, height 1.727 m (5' 8\"), weight 63 kg (138 lb 12.8 oz), last menstrual period 2016, not currently breastfeeding.    General: Well appearing and in no distress.   Eyes: EOMI. Exophthalmos left > right.   HENT: Thyroid gland is not enlarged, and I do not palpate any nodules or masses.   Lympathic: No cervical lymphadenopathy.   CV: RRR, with no murmur.  Respiratory: Lungs CTA bilaterally.   Extremities: No peripheral edema.   Skin: Skin temperature is normal.  Neurologic: Patellar reflexes are 2+ with normal relaxation phase. No tremor with outstretched hands.     Lab Data:   Component      Latest Ref Rng & Units 3/4/2022   TSH      0.40 - 4.00 mU/L 0.55   T4 Free      0.76 - 1.46 ng/dL 0.69 (L)   Triiodothyronine (T3)      60 - 181 ng/dL 84       Assessment and Plan:    Hyperthyroidism secondary to Graves Disease   -She is not currently taking methimazole and TSH is in normal range, with mildly low free T4.   -Follow TFTs every 2 months at a minimum. Standing order in place.     Thyroid eye disease   -Following with ophthalmology and has almost completed treatment with Tepezza, with improvement. This therapy has " been complicated by mild hearing changes, which are being monitored. Blood sugars were not significantly elevated.   -Lubricating eye drops  -Taking selenium, advised to continue for 6 months total, then stop  -She quite smoking after onset of Graves disease.     Anxiety and insomnia  -Symptoms have improved. She is following with psychiatry.     Follow up in clinic in 6 months.       Casandra Yousif MD   Diabetes and Endocrinology   [phone]

## 2022-03-07 ENCOUNTER — TRANSFERRED RECORDS (OUTPATIENT)
Dept: HEALTH INFORMATION MANAGEMENT | Facility: CLINIC | Age: 57
End: 2022-03-07
Payer: COMMERCIAL

## 2022-03-08 ENCOUNTER — VIRTUAL VISIT (OUTPATIENT)
Dept: BEHAVIORAL HEALTH | Facility: TELEHEALTH | Age: 57
End: 2022-03-08
Payer: COMMERCIAL

## 2022-03-08 ENCOUNTER — VIRTUAL VISIT (OUTPATIENT)
Dept: PSYCHIATRY | Facility: CLINIC | Age: 57
End: 2022-03-08
Payer: COMMERCIAL

## 2022-03-08 VITALS — WEIGHT: 137 LBS | BODY MASS INDEX: 20.76 KG/M2 | HEIGHT: 68 IN

## 2022-03-08 DIAGNOSIS — F32.81 PREMENSTRUAL DYSPHORIC DISORDER IN REMISSION: ICD-10-CM

## 2022-03-08 DIAGNOSIS — F41.9 ANXIETY DISORDER, UNSPECIFIED TYPE: Primary | ICD-10-CM

## 2022-03-08 DIAGNOSIS — G47.00 INSOMNIA, UNSPECIFIED TYPE: Primary | ICD-10-CM

## 2022-03-08 DIAGNOSIS — F41.9 ANXIETY: ICD-10-CM

## 2022-03-08 PROCEDURE — 90832 PSYTX W PT 30 MINUTES: CPT | Mod: GT | Performed by: SOCIAL WORKER

## 2022-03-08 PROCEDURE — 99214 OFFICE O/P EST MOD 30 MIN: CPT | Mod: GT | Performed by: PSYCHIATRY & NEUROLOGY

## 2022-03-08 NOTE — PROGRESS NOTES
Telemedicine Visit: The patient's condition can be safely assessed and treated via synchronous audio and visual telemedicine encounter.      Reason for Telemedicine Visit: Services only offered telehealth    Originating Site (Patient Location): Patient's home    Distant Site (Provider Location): Provider Remote Setting- Home Office    Consent:  The patient/guardian has verbally consented to: the potential risks and benefits of telemedicine (video visit) versus in person care; bill my insurance or make self-payment for services provided; and responsibility for payment of non-covered services.     Mode of Communication:  Video Conference via [employer]    As the provider I attest to compliance with applicable laws and regulations related to telemedicine.          Outpatient Psychiatric Progress Note    Name: Kaylyn Fischer   : 1965                    Primary Care Provider: Alberto Gold MD   Therapist: Gloria Lizarraga, private practice       PHQ-9 scores:  PHQ-9 SCORE 2022 2022 3/1/2022   PHQ-9 Total Score - - -   PHQ-9 Total Score MyChart - 9 (Mild depression) 3 (Minimal depression)   PHQ-9 Total Score 9 9 3       FIFI-7 scores:  FIFI-7 SCORE 1/10/2022 2022 3/1/2022   Total Score - - -   Total Score 14 (moderate anxiety) 14 (moderate anxiety) 0 (minimal anxiety)   Total Score 14 14 0       Patient Identification:  Kaylyn is a 56 year old year old female  who presents for return visit with me.  Patient attended the session alone.     Interim History:  [facility]  Patient reported that she is doing much better and that she is feeling more like herself. Patient reported that the only concern she has had since her last visit is that her weight jumped up 5 pounds in a week. She is unsure if it is all because her thyroid levels have been low or if it is the mirtazapine. Patient has been only taking a 1/2 tablet of mirtazapine at night for the past 3 days to see if that helps. She is  "not sleeping as great, but getting enough sleep. Her thyroid fluctuations have improved as well. Patient has no concern with her safety. Patient has not seen her therapist since Christmas.    Kaylyn reports she is doing significantly better than when we last met.  She rates her mood today as 7 or 8 out of 10, and \"is feeling more like herself.\"  She rates her anxiety as 4 out of 10, but says it is \"pretty good.\"  We agreed to not make any changes to her medications at this time.  She has cut down on her mirtazapine on her own because of concerns about weight gain.  Unfortunately, lower doses may be associated with increased appetite and sleepiness.  She will monitor her weight on her home scale more regularly.  She is concerned that she may have gained 5 pounds in a week, but was comparing her weight on her scale at home to one in a doctor's office.    She reports that her thyroid levels are more normal.  She is no longer taking the methimazole.  She has only one more infusion for the swelling in her eyes, and is hoping that her hearing will return to more normal after she completes that treatment.    She has restarted escitalopram and is currently taking 10 mg a day.  She denies any adverse side effects.  She is not sure if her improvement in mood and anxiety is due to the escitalopram or discontinuing the methimazole, but she is comfortable continuing the escitalopram.    She reports she is sleeping well.  She is comfortable being transferred back to the care of her primary care provider.    Vital Signs:   No vital signs taken for this encounter.    Labs:  TSH   Date Value Ref Range Status   03/04/2022 0.55 0.40 - 4.00 mU/L Final   06/15/2015 0.89 0.40 - 4.00 mU/L Final       Current Medications:  Current Outpatient Medications   Medication     Artificial Tear Solution (JUST TEARS EYE DROPS) SOLN     escitalopram (LEXAPRO) 10 MG tablet     estradiol (CLIMARA) 0.05 MG/24HR weekly patch     methimazole (TAPAZOLE) 5 " MG tablet     mirtazapine (REMERON) 15 MG tablet     Selenium 100 MCG CAPS     zolpidem (AMBIEN) 5 MG tablet     No current facility-administered medications for this visit.        The [place] Prescription Monitoring Program has been reviewed and there are no concerns about diversionary activity for controlled substances at this time.      Mental Status Examination:  Kaylyn is a 56-year-old woman who appears her stated age and in no acute distress.  She is neatly groomed in casual clothing.  Speech is clear and normal in rate and tone.  Eye contact is good over the video connection.  Affect is full.  Mood is mildly anxious.  Thoughts are logical and spontaneous with no loose association or flight of ideas.  Thought content shows no psychosis.  No suicidal thoughts.  She is alert and oriented x3.    Assessment and Plan:    ICD-10-CM    1. Insomnia, unspecified type  G47.00    2. Anxiety  F41.9    3. Premenstrual dysphoric disorder in remission  F32.81        Medical comorbidities include:   Patient Active Problem List   Diagnosis     Herpes simplex vulvovaginitis     Adhesive capsulitis of left shoulder     Graves disease     Thyroid eye disease     Insomnia, unspecified type     Anxiety     Premenstrual dysphoric disorder in remission       Treatment Plan:  Patient Instructions   Continue escitalopram 10 mg daily.    Continue mirtazapine 7.5 to 15 mg at bedtime as needed for insomnia.  Monitor weight and discontinue if tolerated if weight gain becomes a problem.    Continue zolpidem 5 mg at bedtime as needed for insomnia.    Continue all other medications per your primary care provider.    Per our conversation, you are graduating from the [facility] Psychiat[facility] program back to the care of your primary care provider.  Please follow up with them in three to six months.  All future refill requests should go to them.    It has been a pleasure working with you, best wishes for the future!      [place]  Resources:      Go to the Emergency Department as needed or call after hours crisis line at [phone].      To schedule individual or family therapy, call  [facility] at [phone].     Follow up with primary care provider as planned or sooner for acute medical concerns.    Call the psychiatric nurse line with medication questions or concerns at [phone].    MyChart may be used to communicate with your provider, but this is not intended to be used for emergencies.    Community Resources:      National Suicide Prevention Lifeline: 649.652.9678 (TTY: 850.123.4575). Call anytime for help.  (www.suicidepreventionlifeline.org)    National [place] on Mental Illness (www.nathan.org): 822.290.9097 or 755-316-1403.     Mental Health Association (www.mentalhealth.org): 606.632.8397 or 931-661-9266.    [place] Crisis Text [place]: Text [place] to 972834    Suicide LifeLine Chat: suicideMynt [facility]ilities Services.org/chat         Administrative Billing:   Video  call duration: 17 minutes   Start: 1:01 PM   Stop: 1:18 PM  Total time spent, including chart review and documentation: 30 minutes    Patient Status:  The patient is being returned to the referring provider for ongoing care and medication prescribing.  The patient can be referred back to this service for further consultation as needed.

## 2022-03-08 NOTE — PROGRESS NOTES
Coll[facility]ative Care Psych[facility] Service ([facility])  March 8, 2022    Behavioral Health Clinician Progress Note    Patient Name: Kaylyn Fischer      Telemedicine Visit: The patient's condition can be safely assessed and treated via synchronous audio and visual telemedicine encounter.      Reason for Telemedicine Visit: Services only offered telehealth    Originating Site (Patient Location): Patient's home    Distant Site (Provider Location): Provider Remote Setting- Home Office    Consent:  The patient/guardian has verbally consented to: the potential risks and benefits of telemedicine (video visit) versus in person care; bill my insurance or make self-payment for services provided; and responsibility for payment of non-covered services.     Mode of Communication:  Video Conference via [employer]    As the provider I attest to compliance with applicable laws and regulations related to telemedicine.         Service Type:  Individual      Service Location:   [employer] / Email (patient reached)     Session Start Time: 12:14pm  Session End Time: 12:30pm      Session Length: 16 - 37      Attendees: Client    Visit Activities (Refresh list every visit): [facility] Only    Diagnostic Assessment Date: 11/23/21  See Flowsheets for today's PHQ-9 and FIFI-7 results  Previous PHQ-9:   PHQ-9 SCORE 1/13/2022 1/13/2022 3/1/2022   PHQ-9 Total Score - - -   PHQ-9 Total Score MyChart - 9 (Mild depression) 3 (Minimal depression)   PHQ-9 Total Score 9 9 3       Previous FIFI-7:   FIFI-7 SCORE 1/10/2022 1/13/2022 3/1/2022   Total Score - - -   Total Score 14 (moderate anxiety) 14 (moderate anxiety) 0 (minimal anxiety)   Total Score 14 14 0       WHODAS  WHODAS 2.0 Total Score 1/10/2022 1/13/2022   Total Score 21 21   Total Score MyChart - 21        CAGE  CAGE-AID Flowsheet 11/23/2021 1/10/2022 1/13/2022   Have you ever felt you should Cut down on your drinking or drug use? 0 0 0   Have people Annoyed you by criticizing your drinking or drug use? 0 0 0    Have you ever felt bad or Guilty about your drinking or drug use? 0 0 0   Have you ever had a drink or used drugs first thing in the morning to steady your nerves or to get rid of a hangover? (Eye opener) 0 0 0   CAGE-AID SCORE 0 0 0   1. Have you felt you ought to cut down on your drinking or drug use? No - No   2. Have people annoyed you by criticizing your drinking or drug use? No - No   3. Have you felt bad or guilty about your drinking or drug use? No - No   4. Have you ever had a drink or used drugs first thing in the morning to steady your nerves or to get rid of a hangover (eye opener)? No - No   CAGE-AID SCORE 0 (A total score of 2 or greater is considered clinically significant) - 0 (A total score of 2 or greater is considered clinically significant)         DATA  Extended Session (60+ minutes): No  Interactive Complexity: No  Crisis: No    Medication Compliance:  Yes      Chemical Use Review:   Substance Use: Chemical use reviewed, no active concerns identified      Tobacco Use: No current tobacco use.      Current Stressors / Issues:  Patient reported that she is doing much better and that she is feeling more like herself. Patient reported that the only concern she has had since her last visit is that her weight jumped up 5 pounds in a week. She is unsure if it is all because her thyroid levels have been low or if it is the mirtazapine. Patient has been only taking a 1/2 tablet of mirtazapine at night for the past 3 days to see if that helps. She is not sleeping as great, but getting enough sleep. Her thyroid fluctuations have improved as well. Patient has no concern with her safety. Patient has not seen her therapist since Christmas.    Progress on Treatment Objective(s) / Homework:  Satisfactory progress - ACTION (Actively working towards change); Intervened by reinforcing change plan / affirming steps taken    Motivational Interviewing    MI Intervention: Expressed Empathy/Understanding, Supported  Autonomy, Collaboration, Evocation, Permission to raise concern or advise and Open-ended questions     Change Talk Expressed by the Patient: Committment to change Activation Taking steps    Provider Response to Change Talk: E - Evoked more info from patient about behavior change, A - Affirmed patient's thoughts, decisions, or attempts at behavior change, R - Reflected patient's change talk and S - Summarized patient's change talk statements        Review of Symptoms per patient report:  Depression: Change in sleep and Change in energy level  [place]:  No Symptoms  Psychosis: No Symptoms  Anxiety: Excessive worry, Nervousness and Social anxiety, knot in stomach, doesn't like to be alone  Panic:  Palpitations, Tremors and Sense of impending doom  Post Traumatic Stress Disorder:  No Symptoms   Eating Disorder: No Symptoms  ADD / ADHD:  No symptoms  Conduct Disorder: No symptoms  Autism Spectrum Disorder: No symptoms  Obsessive Compulsive Disorder: No Symptoms      Changes in Health Issues:   Yes: graves disease.  Believes that her thyroid levels affect her mood quite a bit.    Assessment: Current Emotional / Mental Status (status of significant symptoms):  Risk status (Self / Other harm or suicidal ideation)  Patient denies a history of suicidal ideation, suicide attempts, self-injurious behavior, homicidal ideation, homicidal behavior and and other safety concerns  Patient denies current fears or concerns for personal safety.  Patient denies current or recent suicidal ideation or behaviors.  Patient denies current or recent homicidal ideation or behaviors.  Patient denies current or recent self injurious behavior or ideation.  Patient denies other safety concerns.  A safety and risk management plan has not been developed at this time, however patient was encouraged to call [place] [phone] should there be a change in any of these risk factors.    Appearance:   Appropriate   Eye Contact:   Good   Psychomotor  Behavior: Normal   Attitude:   Cooperative   Orientation:   All  Speech   Rate / Production: Normal    Volume:  Normal   Mood:    Anxious   Affect:    Appropriate   Thought Content:  Clear   Thought Form:  Coherent  Logical   Insight:    Good     Diagnoses:  1. Anxiety disorder, unspecified type        Collateral Reports Completed:  [facility]    Plan: (Homework, other):  Patient was given information about behavioral services and encouraged to schedule a follow up appointment with the clinic [facility] in conjunction with [facility].  She was also given information about mental health symptoms and treatment options .  Has an individual therapist she is seeing regularly.  CD Recommendations: No indications of CD issues.  BRE Sierra, [facility]      BRE Sierra, BRE, [facility]  March 8, 2022

## 2022-03-08 NOTE — PATIENT INSTRUCTIONS
Continue escitalopram 10 mg daily.    Continue mirtazapine 7.5 to 15 mg at bedtime as needed for insomnia.  Monitor weight and discontinue if tolerated if weight gain becomes a problem.    Continue zolpidem 5 mg at bedtime as needed for insomnia.    Continue all other medications per your primary care provider.    Per our conversation, you are graduating from the [facility] Psychiatry program back to the care of your primary care provider.  Please follow up with them in three to six months.  All future refill requests should go to them.    It has been a pleasure working with you, best wishes for the future!      [place] Resources:      Go to the Emergency Department as needed or call after hours crisis line at [phone].      To schedule individual or family therapy, call [facility] at [phone].     Follow up with primary care provider as planned or sooner for acute medical concerns.    Call the psychiatric nurse line with medication questions or concerns at [phone].    [employer] may be used to communicate with your provider, but this is not intended to be used for emergencies.    Community Resources:      National Suicide Prevention Lifeline: 962.136.7320 (TTY: 366.473.8705). Call anytime for help.  (www.suicidepreventionlifeline.org)    National [place] on Mental Illness (www.nathan.org): 523.579.6572 or 004-786-9038.     Mental Health Association (www.mentalhealth.org): 991.315.4330 or 131-545-3937.    [place] Crisis Text Line: Text [place] to 592583    Suicide LifeLine Chat: suicidepreQritiqrline.org/chat

## 2022-03-08 NOTE — PROGRESS NOTES
[facility] Post-Appointment Cart -check      Correct pharmacy verified with patient and updated in chart? [x] yes []no    Charge captured ? [] yes  [] no [x] n/a-virtual     Medications ordered this visit were e-scribed.  Verified by order class [] yes  [x] no    List Medications:    Medication changes or discontinuations were communicated to patient's pharmacy: [] yes  [x] no    UA collected [] yes  [] no  [x] n/a-virtual     Outside referrals / labs, etc support staff to follow up: [] yes  [x] no    Future appointment was made: [] yes  [x] no  [] n/a   Return to PCP for ongoing medication management     Dictation completed at time of chart check: [] yes  [x] no    I have checked the documentation for today s encounters and the above information has been reviewed and completed.      Fransisca Michel on March 8, 2022 at 3:02 PM

## 2022-03-08 NOTE — Clinical Note
Gray Dominguez,  I met with Kaylyn today.  She reports she is doing much better since her thyroid disease treatment is stabilizing.  She did restart Lexapro, which seems to have helped.  She has been sleeping pretty well with a low dose of mirtazapine, but may need to stop it if she gains significant weight.  Zolpidem was also beneficial for her sleep, but she is using it very sparingly.    Please feel free to contact me with questions or concerns.  Thank you for the opportunity to be involved in the care of this patient.    Regards,  Danita Leal MD  Collaborative Care Psychiatry  [facility]

## 2022-03-26 ENCOUNTER — MYC REFILL (OUTPATIENT)
Dept: PSYCHIATRY | Facility: CLINIC | Age: 57
End: 2022-03-26
Payer: COMMERCIAL

## 2022-03-26 DIAGNOSIS — G47.00 INSOMNIA, UNSPECIFIED TYPE: ICD-10-CM

## 2022-03-28 ENCOUNTER — TELEPHONE (OUTPATIENT)
Dept: BEHAVIORAL HEALTH | Facility: CLINIC | Age: 57
End: 2022-03-28
Payer: COMMERCIAL

## 2022-03-28 RX ORDER — MIRTAZAPINE 15 MG/1
15 TABLET, FILM COATED ORAL AT BEDTIME
Qty: 90 TABLET | Refills: 0 | OUTPATIENT
Start: 2022-03-28

## 2022-04-11 ENCOUNTER — MYC REFILL (OUTPATIENT)
Dept: PSYCHOLOGY | Facility: CLINIC | Age: 57
End: 2022-04-11
Payer: COMMERCIAL

## 2022-04-11 NOTE — TELEPHONE ENCOUNTER
"Refill request r'cd from [facility] via fax for Mirtazapine 15 mg denied due to Patient no longer under Dr. Leal's care . Faxed \"not authorized\" back to pharmacy.    Kena Rojas RN April 11, 2022 2:32 PM    "

## 2022-04-11 NOTE — TELEPHONE ENCOUNTER
"Refill request r'cd from [facility] via fax for Mirtazepine 15 mg denied due to no longer under provider care. Faxed \"not authorized\" back to pharmacy.    Kena Rojas RN April 11, 2022 1:43 PM    "

## 2022-04-28 ENCOUNTER — OFFICE VISIT (OUTPATIENT)
Dept: FAMILY MEDICINE | Facility: CLINIC | Age: 57
End: 2022-04-28
Payer: COMMERCIAL

## 2022-04-28 VITALS
HEART RATE: 69 BPM | WEIGHT: 143 LBS | SYSTOLIC BLOOD PRESSURE: 121 MMHG | TEMPERATURE: 97 F | OXYGEN SATURATION: 97 % | RESPIRATION RATE: 15 BRPM | DIASTOLIC BLOOD PRESSURE: 82 MMHG | BODY MASS INDEX: 21.74 KG/M2

## 2022-04-28 DIAGNOSIS — G47.00 INSOMNIA, UNSPECIFIED TYPE: ICD-10-CM

## 2022-04-28 DIAGNOSIS — F41.9 ANXIETY: ICD-10-CM

## 2022-04-28 RX ORDER — ESCITALOPRAM OXALATE 10 MG/1
10 TABLET ORAL DAILY
Qty: 90 TABLET | Refills: 3 | Status: SHIPPED | OUTPATIENT
Start: 2022-04-28 | End: 2023-03-02

## 2022-04-28 RX ORDER — MIRTAZAPINE 15 MG/1
15 TABLET, FILM COATED ORAL AT BEDTIME
Qty: 90 TABLET | Refills: 1 | Status: SHIPPED | OUTPATIENT
Start: 2022-04-28 | End: 2022-09-02

## 2022-04-28 ASSESSMENT — ANXIETY QUESTIONNAIRES
2. NOT BEING ABLE TO STOP OR CONTROL WORRYING: NOT AT ALL
7. FEELING AFRAID AS IF SOMETHING AWFUL MIGHT HAPPEN: SEVERAL DAYS
3. WORRYING TOO MUCH ABOUT DIFFERENT THINGS: NOT AT ALL
GAD7 TOTAL SCORE: 2
6. BECOMING EASILY ANNOYED OR IRRITABLE: NOT AT ALL
5. BEING SO RESTLESS THAT IT IS HARD TO SIT STILL: NOT AT ALL
IF YOU CHECKED OFF ANY PROBLEMS ON THIS QUESTIONNAIRE, HOW DIFFICULT HAVE THESE PROBLEMS MADE IT FOR YOU TO DO YOUR WORK, TAKE CARE OF THINGS AT HOME, OR GET ALONG WITH OTHER PEOPLE: NOT DIFFICULT AT ALL
1. FEELING NERVOUS, ANXIOUS, OR ON EDGE: SEVERAL DAYS

## 2022-04-28 ASSESSMENT — PATIENT HEALTH QUESTIONNAIRE - PHQ9
5. POOR APPETITE OR OVEREATING: NOT AT ALL
SUM OF ALL RESPONSES TO PHQ QUESTIONS 1-9: 0

## 2022-04-28 NOTE — PROGRESS NOTES
Assessment & Plan   Problem List Items Addressed This Visit        Other    Insomnia, unspecified type    Relevant Medications    mirtazapine (REMERON) 15 MG tablet    Anxiety    Relevant Medications    mirtazapine (REMERON) 15 MG tablet    escitalopram (LEXAPRO) 10 MG tablet         Agreed to refills of medications as noted above. Will continue to monitor. Plan on follow up in one year, sooner as needed.     22 minutes spent on the date of the encounter doing chart review, history and exam, documentation and further activities as noted.    Alberto Gold MD  [facility]    Renu Patiño is a 56 year old who presents for the following health issues    HPI   Anxiety  - has been taking lexapro 10mg daily for this   - feels it has been working well  - no concern for side effects  - would like to continue on this medication if possible    Insomnia  - takes mirtazapine, 15mg, nightly for this  - this has also been working very well  - original prescriber did mention possible significant weight gain on this medication, but Kaylyn has not noticed this  - no concerns for other side effects  - would like to continue using this medication if possible    Review of Systems   Constitutional, HEENT, cardiovascular, pulmonary, gi and gu systems are negative, except as otherwise noted.      Objective    /82 (BP Location: Right arm, Patient Position: Sitting, Cuff Size: Adult Regular)   Pulse 69   Temp 97  F (36.1  C) (Skin)   Resp 15   Wt 64.9 kg (143 lb)   LMP 11/19/2016 (Exact Date)   SpO2 97%   BMI 21.74 kg/m    Body mass index is 21.74 kg/m .  Physical Exam   GENERAL: healthy, alert and no distress  EYES: Eyes grossly normal to inspection, PERRL and conjunctivae and sclerae normal  NECK: no adenopathy, no asymmetry, masses, or scars and thyroid normal to palpation  RESP: lungs clear to auscultation - no rales, rhonchi or wheezes  CV: regular rate and rhythm, normal S1 S2, no S3 or S4, no murmur, click  or rub, no peripheral edema and peripheral pulses strong  ABDOMEN: soft, nontender, no hepatosplenomegaly, no masses and bowel sounds normal  MS: no gross musculoskeletal defects noted, no edema  SKIN: no suspicious lesions or rashes  NEURO: Normal strength and tone, mentation intact and speech normal  PSYCH: mentation appears normal, affect normal/bright

## 2022-04-28 NOTE — NURSING NOTE
56 year old  Chief Complaint   Patient presents with     Recheck Medication     Refill for lexapro and mirtazapine        Blood pressure 121/82, pulse 69, temperature 97  F (36.1  C), temperature source Skin, resp. rate 15, weight 64.9 kg (143 lb), last menstrual period 2016, SpO2 97 %, not currently breastfeeding. Body mass index is 21.74 kg/m .  Patient Active Problem List   Diagnosis     Herpes simplex vulvovaginitis     Adhesive capsulitis of left shoulder     Graves disease     Thyroid eye disease     Insomnia, unspecified type     Anxiety     Premenstrual dysphoric disorder in remission       Wt Readings from Last 2 Encounters:   22 64.9 kg (143 lb)   22 62.1 kg (137 lb)     BP Readings from Last 3 Encounters:   22 121/82   22 126/77   10/01/21 101/70         Current Outpatient Medications   Medication     Artificial Tear Solution (JUST TEARS EYE DROPS) SOLN     escitalopram (LEXAPRO) 10 MG tablet     estradiol (CLIMARA) 0.05 MG/24HR weekly patch     methimazole (TAPAZOLE) 5 MG tablet     mirtazapine (REMERON) 15 MG tablet     Selenium 100 MCG CAPS     zolpidem (AMBIEN) 5 MG tablet     No current facility-administered medications for this visit.       Social History     Tobacco Use     Smoking status: Former Smoker     Packs/day: 0.50     Years: 20.00     Pack years: 10.00     Types: Cigarettes     Quit date: 2003     Years since quittin.2     Smokeless tobacco: Never Used   Vaping Use     Vaping Use: Never used   Substance Use Topics     Alcohol use: Yes     Comment: for several months no alcohol.  very little now     Drug use: No     Comment: CBD gummies.  doesn't help with sleep like hoped.       Health Maintenance Due   Topic Date Due     PREVENTIVE CARE VISIT  Never done     ADVANCE CARE PLANNING  Never done     HPV TEST  2021     PAP  2021     INFLUENZA VACCINE (1) 2021       Lab Results   Component Value Date    PAP NIL 2018  28, 2022 4:03 PM

## 2022-04-29 ENCOUNTER — LAB (OUTPATIENT)
Dept: LAB | Facility: CLINIC | Age: 57
End: 2022-04-29
Payer: COMMERCIAL

## 2022-04-29 DIAGNOSIS — E07.9 THYROID EYE DISEASE: ICD-10-CM

## 2022-04-29 DIAGNOSIS — E05.00 GRAVES DISEASE: ICD-10-CM

## 2022-04-29 DIAGNOSIS — H57.89 THYROID EYE DISEASE: ICD-10-CM

## 2022-04-29 LAB
T3 SERPL-MCNC: 84 NG/DL (ref 60–181)
T4 FREE SERPL-MCNC: 0.81 NG/DL (ref 0.76–1.46)
TSH SERPL DL<=0.005 MIU/L-ACNC: 0.82 MU/L (ref 0.4–4)

## 2022-04-29 PROCEDURE — 84443 ASSAY THYROID STIM HORMONE: CPT

## 2022-04-29 PROCEDURE — 84439 ASSAY OF FREE THYROXINE: CPT

## 2022-04-29 PROCEDURE — 36415 COLL VENOUS BLD VENIPUNCTURE: CPT

## 2022-04-29 PROCEDURE — 84480 ASSAY TRIIODOTHYRONINE (T3): CPT

## 2022-04-29 ASSESSMENT — ANXIETY QUESTIONNAIRES: GAD7 TOTAL SCORE: 2

## 2022-06-29 ENCOUNTER — LAB (OUTPATIENT)
Dept: LAB | Facility: CLINIC | Age: 57
End: 2022-06-29
Payer: COMMERCIAL

## 2022-06-29 DIAGNOSIS — E05.00 GRAVES DISEASE: ICD-10-CM

## 2022-06-29 DIAGNOSIS — E07.9 THYROID EYE DISEASE: ICD-10-CM

## 2022-06-29 DIAGNOSIS — H57.89 THYROID EYE DISEASE: ICD-10-CM

## 2022-06-29 LAB — T3 SERPL-MCNC: 87 NG/DL (ref 85–202)

## 2022-06-29 PROCEDURE — 84443 ASSAY THYROID STIM HORMONE: CPT

## 2022-06-29 PROCEDURE — 84439 ASSAY OF FREE THYROXINE: CPT

## 2022-06-29 PROCEDURE — 84480 ASSAY TRIIODOTHYRONINE (T3): CPT

## 2022-06-29 PROCEDURE — 36415 COLL VENOUS BLD VENIPUNCTURE: CPT

## 2022-06-30 LAB
T4 FREE SERPL-MCNC: 0.73 NG/DL (ref 0.76–1.46)
TSH SERPL DL<=0.005 MIU/L-ACNC: 0.87 MU/L (ref 0.4–4)

## 2022-08-30 ENCOUNTER — LAB (OUTPATIENT)
Dept: LAB | Facility: CLINIC | Age: 57
End: 2022-08-30
Payer: COMMERCIAL

## 2022-08-30 DIAGNOSIS — E07.9 THYROID EYE DISEASE: ICD-10-CM

## 2022-08-30 DIAGNOSIS — E05.00 GRAVES DISEASE: ICD-10-CM

## 2022-08-30 DIAGNOSIS — H57.89 THYROID EYE DISEASE: ICD-10-CM

## 2022-08-30 PROCEDURE — 84443 ASSAY THYROID STIM HORMONE: CPT

## 2022-08-30 PROCEDURE — 36415 COLL VENOUS BLD VENIPUNCTURE: CPT

## 2022-08-30 PROCEDURE — 84480 ASSAY TRIIODOTHYRONINE (T3): CPT

## 2022-08-30 PROCEDURE — 84439 ASSAY OF FREE THYROXINE: CPT

## 2022-08-30 NOTE — PROGRESS NOTES
Reached out via phone to the pt on 8/30 to remind them of their visit with Dr. Yousif.  Lilliana Cabrera

## 2022-08-31 LAB
T3 SERPL-MCNC: 85 NG/DL (ref 85–202)
T4 FREE SERPL-MCNC: 0.88 NG/DL (ref 0.76–1.46)
TSH SERPL DL<=0.005 MIU/L-ACNC: 0.97 MU/L (ref 0.4–4)

## 2022-08-31 NOTE — PROGRESS NOTES
"Endocrinology and Diabetes Clinic Visit    Subjective:   Kaylyn Fischer is a 56 year old woman seen today for a follow up visit for hyperthyroidism due to Graves Disease.     She was diagnosed with hyperthyroidism in 2021, and diagnosed with Graves disease after initial visit in this clinic based on elevated TSI (3.3, normal range less than 1.3). She had received recent IV contract and GOLDEN uptake and scan was not possible. Treatment with methimazole was initiated. She also has RAMAN and saw ophthalmology, has been treated with Tepezza (completed 2022).     Interval History:  She had some ear changes with Tepezza, which she describes as a feeling of fullness like her ears need to pop, this still happens sometimes. Eyes improved and have remained stable. Took selenium and has now stopped. Weight is back to baseline and has been stable (she had lost 30 lbs when ill with hyperthyroidism). Today she has no fatigue. Insomnia has resolved and mental health has been good. She does note hair falling out and dry skin.     She has not taken any methimazole since January.     Current Outpatient Medications   Medication Instructions     escitalopram (LEXAPRO) 10 mg, Oral, DAILY     estradiol (CLIMARA) 0.05 MG/24HR weekly patch APPLY 1 PATCH TOPICALLY TO  SKIN ONCE A WEEK     Social History     Tobacco Use     Smoking status: Former Smoker     Packs/day: 0.50     Years: 20.00     Pack years: 10.00     Types: Cigarettes     Quit date: 2003     Years since quittin.5     Smokeless tobacco: Never Used   Substance Use Topics     Alcohol use: Yes     Comment: for several months no alcohol.  very little now       Review of Systems: A comprehensive ROS was negative except as noted in HPI.     Physical Examination:  Estimated body mass index is 22.58 kg/m  as calculated from the following:    Height as of 3/8/22: 1.727 m (5' 8\").    Weight as of this encounter: 67.4 kg (148 lb 8 oz).    Wt Readings from Last 4 " Encounters:   09/02/22 67.4 kg (148 lb 8 oz)   04/28/22 64.9 kg (143 lb)   03/08/22 62.1 kg (137 lb)   03/04/22 63 kg (138 lb 12.8 oz)     Blood pressure 128/82, pulse 78, weight 67.4 kg (148 lb 8 oz), last menstrual period 11/19/2016, not currently breastfeeding.    General: Healthy appearing and in no distress.   Eyes: EOMI without double vision. 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   HENT: No significant thyromegaly or mass. No nodules.   Lympathic: No cervical or supraclavicular lymphadenopathy.   CV: RRR, with no murmur.  Respiratory: Lungs CTA bilaterally.   Extremities: No peripheral edema.   Skin: Skin temperature is normal.  Neurologic:  No tremor with outstretched hands.       Lab Data:   Component      Latest Ref Rng & Units 8/30/2022   Triiodothyronine (T3)      85 - 202 ng/dL 85   T4 Free      0.76 - 1.46 ng/dL 0.88   TSH      0.40 - 4.00 mU/L 0.97       Assessment and Plan:    Hyperthyroidism secondary to Graves Disease   - She is not currently taking methimazole and thyroid labs are at goal.   - Follow TFTs every 4 months, more often if she develops new symptoms. Standing order in place.   - If she were to experience a flare, we reviewed treatment options which include resuming methimazole or thyroidectomy. GOLDEN ablation may be an option but we would need to be very cautious given eye disease.     Thyroid eye disease   - Stable post Tepezza therapy     Anxiety and insomnia  - Improved as thyroid hormone levels normalized, and mood has remained stable         30 minutes spent on the date of the encounter doing chart review, history and exam, documentation and further activities per the note      Casandra Yousif MD  Endocrinology and Diabetes   [phone]

## 2022-09-02 ENCOUNTER — OFFICE VISIT (OUTPATIENT)
Dept: ENDOCRINOLOGY | Facility: CLINIC | Age: 57
End: 2022-09-02
Payer: COMMERCIAL

## 2022-09-02 VITALS
DIASTOLIC BLOOD PRESSURE: 82 MMHG | BODY MASS INDEX: 22.58 KG/M2 | HEART RATE: 78 BPM | WEIGHT: 148.5 LBS | SYSTOLIC BLOOD PRESSURE: 128 MMHG

## 2022-09-02 DIAGNOSIS — H57.89 THYROID EYE DISEASE: ICD-10-CM

## 2022-09-02 DIAGNOSIS — E05.00 GRAVES DISEASE: Primary | ICD-10-CM

## 2022-09-02 DIAGNOSIS — E07.9 THYROID EYE DISEASE: ICD-10-CM

## 2022-09-02 PROCEDURE — 99214 OFFICE O/P EST MOD 30 MIN: CPT | Performed by: INTERNAL MEDICINE

## 2022-09-02 ASSESSMENT — PAIN SCALES - GENERAL: PAINLEVEL: NO PAIN (0)

## 2022-09-02 NOTE — LETTER
2022       RE: Kaylyn Fischer  [address] [place] [address]     Dear Colleague,    Thank you for referring your patient, Kaylyn Fischer, to the [facility] [place] [facility]. Please see a copy of my visit note below.    Reached out via phone to the pt on  to remind them of their visit with Dr. Yousif.  Lilliana Cabrera           Endocrinology and Diabetes Clinic Visit    Subjective:   Kaylyn Fischer is a 56 year old woman seen today for a follow up visit for hyperthyroidism due to Graves Disease.     She was diagnosed with hyperthyroidism in 2021, and diagnosed with Graves disease after initial visit in this clinic based on elevated TSI (3.3, normal range less than 1.3). She had received recent IV contract and GOLDEN uptake and scan was not possible. Treatment with methimazole was initiated. She also has RAMAN and saw ophthalmology, has been treated with Tepezza (completed 2022).     Interval History:  She had some ear changes with Tepezza, which she describes as a feeling of fullness like her ears need to pop, this still happens sometimes. Eyes improved and have remained stable. Took selenium and has now stopped. Weight is back to baseline and has been stable (she had lost 30 lbs when ill with hyperthyroidism). Today she has no fatigue. Insomnia has resolved and mental health has been good. She does note hair falling out and dry skin.     She has not taken any methimazole since January.     Current Outpatient Medications   Medication Instructions     escitalopram (LEXAPRO) 10 mg, Oral, DAILY     estradiol (CLIMARA) 0.05 MG/24HR weekly patch APPLY 1 PATCH TOPICALLY TO  SKIN ONCE A WEEK     Social History     Tobacco Use     Smoking status: Former Smoker     Packs/day: 0.50     Years: 20.00     Pack years: 10.00     Types: Cigarettes     Quit date: 2003     Years since quittin.5     Smokeless  "tobacco: Never Used   Substance Use Topics     Alcohol use: Yes     Comment: for several months no alcohol.  very little now       Review of Systems: A comprehensive ROS was negative except as noted in HPI.     Physical Examination:  Estimated body mass index is 22.58 kg/m  as calculated from the following:    Height as of 3/8/22: 1.727 m (5' 8\").    Weight as of this encounter: 67.4 kg (148 lb 8 oz).    Wt Readings from Last 4 Encounters:   09/02/22 67.4 kg (148 lb 8 oz)   04/28/22 64.9 kg (143 lb)   03/08/22 62.1 kg (137 lb)   03/04/22 63 kg (138 lb 12.8 oz)     Blood pressure 128/82, pulse 78, weight 67.4 kg (148 lb 8 oz), last menstrual period 11/19/2016, not currently breastfeeding.    General: Healthy appearing and in no distress.   Eyes: EOMI without double vision. 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   HENT: No significant thyromegaly or mass. No nodules.   Lympathic: No cervical or supraclavicular lymphadenopathy.   CV: RRR, with no murmur.  Respiratory: Lungs CTA bilaterally.   Extremities: No peripheral edema.   Skin: Skin temperature is normal.  Neurologic:  No tremor with outstretched hands.       Lab Data:   Component      Latest Ref Rng & Units 8/30/2022   Triiodothyronine (T3)      85 - 202 ng/dL 85   T4 Free      0.76 - 1.46 ng/dL 0.88   TSH      0.40 - 4.00 mU/L 0.97       Assessment and Plan:    Hyperthyroidism secondary to Graves Disease   - She is not currently taking methimazole and thyroid labs are at goal.   - Follow TFTs every 4 months, more often if she develops new symptoms. Standing order in place.   - If she were to experience a flare, we reviewed treatment options which include resuming methimazole or thyroidectomy. GOLDEN ablation may be an option but we would need to be very cautious given eye disease.     Thyroid eye disease   - Stable post Tepezza therapy     Anxiety and insomnia  - Improved as thyroid hormone levels normalized, and mood has remained stable         30 minutes spent on " the date of the encounter doing chart review, history and exam, documentation and further activities per the note      Casandra Yousif MD  Endocrinology and Diabetes   [phone]

## 2022-11-20 ENCOUNTER — HEALTH MAINTENANCE LETTER (OUTPATIENT)
Age: 57
End: 2022-11-20

## 2022-12-08 ENCOUNTER — LAB (OUTPATIENT)
Dept: LAB | Facility: CLINIC | Age: 57
End: 2022-12-08
Payer: COMMERCIAL

## 2022-12-08 DIAGNOSIS — H57.89 THYROID EYE DISEASE: ICD-10-CM

## 2022-12-08 DIAGNOSIS — E05.00 GRAVES DISEASE: ICD-10-CM

## 2022-12-08 DIAGNOSIS — E07.9 THYROID EYE DISEASE: ICD-10-CM

## 2022-12-08 PROCEDURE — 84480 ASSAY TRIIODOTHYRONINE (T3): CPT

## 2022-12-08 PROCEDURE — 36415 COLL VENOUS BLD VENIPUNCTURE: CPT

## 2022-12-08 PROCEDURE — 84443 ASSAY THYROID STIM HORMONE: CPT

## 2022-12-08 PROCEDURE — 84439 ASSAY OF FREE THYROXINE: CPT

## 2022-12-09 LAB
T3 SERPL-MCNC: 78 NG/DL (ref 85–202)
T4 FREE SERPL-MCNC: 1.05 NG/DL (ref 0.9–1.7)
TSH SERPL DL<=0.005 MIU/L-ACNC: 0.98 UIU/ML (ref 0.3–4.2)

## 2023-01-18 ENCOUNTER — MYC MEDICAL ADVICE (OUTPATIENT)
Dept: FAMILY MEDICINE | Facility: CLINIC | Age: 58
End: 2023-01-18

## 2023-01-18 DIAGNOSIS — H43.392 VITREOUS FLOATERS OF LEFT EYE: Primary | ICD-10-CM

## 2023-01-19 ENCOUNTER — TELEPHONE (OUTPATIENT)
Dept: OPHTHALMOLOGY | Facility: CLINIC | Age: 58
End: 2023-01-19
Payer: COMMERCIAL

## 2023-01-19 NOTE — TELEPHONE ENCOUNTER
[facility] Call Center    Phone Message    May a detailed message be left on voicemail: yes     Reason for Call: Appointment Intake    Referring Provider Name: Alberto Gold MD  Diagnosis and/or Symptoms: Vitreous floaters of left eye [H43.392]    Per protocol writer is to send te for floaters for scheduling    Action Taken: Message routed to:  [facility] eye    Travel Screening: Not Applicable

## 2023-01-19 NOTE — TELEPHONE ENCOUNTER
Referral placed to adult eye care for further evaluation of increasing floaters in left eye as reported by pt. Pt notified of who to contact to schedule.    Selwyn SCHWAB, RN  01/19/23 9:19 AM

## 2023-01-19 NOTE — TELEPHONE ENCOUNTER
Onset of floater left eye x 1 week. Floater appears unchanged from a week ago.     History of retinal detachment right eye.    Offered appt today, pt declines. Will see Dr. Ford tomorrow. Understands if vision worsens tonight, pt should go to ED.

## 2023-01-20 ENCOUNTER — OFFICE VISIT (OUTPATIENT)
Dept: OPHTHALMOLOGY | Facility: CLINIC | Age: 58
End: 2023-01-20
Payer: COMMERCIAL

## 2023-01-20 DIAGNOSIS — H57.89 THYROID EYE DISEASE: ICD-10-CM

## 2023-01-20 DIAGNOSIS — E05.00 GRAVES DISEASE: ICD-10-CM

## 2023-01-20 DIAGNOSIS — H43.812 POSTERIOR VITREOUS DETACHMENT OF LEFT EYE: Primary | ICD-10-CM

## 2023-01-20 DIAGNOSIS — E07.9 THYROID EYE DISEASE: ICD-10-CM

## 2023-01-20 DIAGNOSIS — Z86.69 HISTORY OF RETINAL DETACHMENT: ICD-10-CM

## 2023-01-20 PROCEDURE — 92012 INTRM OPH EXAM EST PATIENT: CPT | Performed by: STUDENT IN AN ORGANIZED HEALTH CARE EDUCATION/TRAINING PROGRAM

## 2023-01-20 ASSESSMENT — REFRACTION_WEARINGRX
OS_AXIS: 065
OD_ADD: +2.25
OD_SPHERE: -8.25
OS_CYLINDER: +1.00
OD_AXIS: 105
OS_ADD: +2.25
SPECS_TYPE: PAL
OD_CYLINDER: +1.75
OS_SPHERE: -5.50

## 2023-01-20 ASSESSMENT — VISUAL ACUITY
METHOD: SNELLEN - LINEAR
OD_CC: 20/25
OD_CC+: +2
OS_CC: 20/20
CORRECTION_TYPE: GLASSES

## 2023-01-20 ASSESSMENT — CONF VISUAL FIELD
OS_SUPERIOR_TEMPORAL_RESTRICTION: 0
OS_INFERIOR_TEMPORAL_RESTRICTION: 0
OD_INFERIOR_NASAL_RESTRICTION: 0
OD_SUPERIOR_NASAL_RESTRICTION: 0
OS_SUPERIOR_NASAL_RESTRICTION: 0
OS_INFERIOR_NASAL_RESTRICTION: 0
OD_NORMAL: 1
OD_SUPERIOR_TEMPORAL_RESTRICTION: 0
OS_NORMAL: 1
OD_INFERIOR_TEMPORAL_RESTRICTION: 0

## 2023-01-20 ASSESSMENT — SLIT LAMP EXAM - LIDS
COMMENTS: LID RETRACTION
COMMENTS: LID RETRACTION

## 2023-01-20 ASSESSMENT — TONOMETRY
IOP_METHOD: APPLANATION
OD_IOP_MMHG: 16
OS_IOP_MMHG: 16

## 2023-01-20 ASSESSMENT — CUP TO DISC RATIO
OD_RATIO: 0.00
OS_RATIO: 0.00

## 2023-01-20 ASSESSMENT — EXTERNAL EXAM - LEFT EYE: OS_EXAM: PROPTOSIS

## 2023-01-20 ASSESSMENT — EXTERNAL EXAM - RIGHT EYE: OD_EXAM: MINIMAL PROPTOSIS

## 2023-01-20 NOTE — PROGRESS NOTES
Current Eye Medications: Artificial tears as needed     Subjective: Here for evaluation of floater in left eye.   Patient complains of constant new floater in left eye. Started 1 week ago. No flashes.   Denies vision changes.   Last eye exam was 1 month ago.     History of retinal detachment with repair right eye.      Objective:  See Ophthalmology Exam.       Assessment:  Kaylyn Fischer is a 57 year old female who presents with:   Encounter Diagnoses   Name Primary?     Posterior vitreous detachment of left eye      History of retinal detachment s/p SBP OD 1992      Thyroid eye disease      Graves disease - Both Eyes        Plan:  Continue artificial tears up to four times a day as needed    Signs and symptoms of retinal detachment (shower of black floaters, frequent flashes of light, curtain over part of visual field) discussed.    Yasmin Ford MD  [phone]

## 2023-01-20 NOTE — LETTER
1/20/2023         RE: Kaylyn Fischer  [address] [place] [address]        Dear Colleague,    Thank you for referring your patient, Kaylyn Fischer, to the [facility]. Please see a copy of my visit note below.     Current Eye Medications: Artificial tears as needed     Subjective: Here for evaluation of floater in left eye.   Patient complains of constant new floater in left eye. Started 1 week ago. No flashes.   Denies vision changes.   Last eye exam was 1 month ago.     History of retinal detachment with repair right eye.      Objective:  See Ophthalmology Exam.       Assessment:  Kaylyn Fischer is a 57 year old female who presents with:   Encounter Diagnoses   Name Primary?     Posterior vitreous detachment of left eye      History of retinal detachment s/p SBP OD 1992      Thyroid eye disease      Graves disease - Both Eyes        Plan:  Continue artificial tears up to four times a day as needed    Signs and symptoms of retinal detachment (shower of black floaters, frequent flashes of light, curtain over part of visual field) discussed.    Yasmin Ford MD  [phone]          Again, thank you for allowing me to participate in the care of your patient.        Sincerely,        Yasmin Ford MD

## 2023-01-20 NOTE — PATIENT INSTRUCTIONS
Continue artificial tears up to four times a day as needed    Signs and symptoms of retinal detachment (shower of black floaters, frequent flashes of light, curtain over part of visual field) discussed.    Yasmin Ford MD  [phone]

## 2023-02-27 ASSESSMENT — ENCOUNTER SYMPTOMS
ARTHRALGIAS: 1
HEMATOCHEZIA: 0
PARESTHESIAS: 0
NERVOUS/ANXIOUS: 0
DIZZINESS: 0
NAUSEA: 0
JOINT SWELLING: 0
DYSURIA: 0
EYE PAIN: 1
WEAKNESS: 0
PALPITATIONS: 0
HEARTBURN: 0
CHILLS: 0
SORE THROAT: 0
COUGH: 0
MYALGIAS: 0
DIARRHEA: 0
CONSTIPATION: 0
FEVER: 0
BREAST MASS: 0
HEMATURIA: 0
HEADACHES: 0
SHORTNESS OF BREATH: 0
ABDOMINAL PAIN: 0
FREQUENCY: 0

## 2023-03-01 NOTE — PROGRESS NOTES
Assessment & Plan   Problem List Items Addressed This Visit        Urinary    Herpes simplex vulvovaginitis    Relevant Medications    valACYclovir (VALTREX) 500 MG tablet       Other    RESOLVED: Anxiety - Primary    Relevant Medications    escitalopram (LEXAPRO) 10 MG tablet   Other Visit Diagnoses     Need for diphtheria-tetanus-pertussis (Tdap) vaccine        Relevant Orders    TDAP (ADACEL,BOOSTRIX) (Completed)    Vasomotor symptoms due to menopause        Relevant Medications    estradiol (CLIMARA) 0.05 MG/24HR weekly patch         Meds refilled as noted above.     TDaP administered as indicated.     22 minutes spent on the date of the encounter doing chart review, history and exam, documentation and further activities as noted.    Alberto Gold MD  [facility]    Renu Patiño is a 57 year old presenting for the following health issues:  Recheck Medication (Refills & Tdap vaccine.)      HPI   HSV  - gets genital outbreaks maybe once every few months or so  - would like to have a Rx on hand to address symptoms at the onset of symptoms  - currently denies symptoms    Menopause symptoms  - previously managed with estradiol patches which have worked well  - requesting refill of this medication today    Anxiety  - currently meds: Lexapro 10mg   - reports medication has been working effectively   - PHQ and FIFI as noted below  PHQ 3/1/2022 4/28/2022 3/2/2023   PHQ-9 Total Score 3 0 0   Q9: Thoughts of better off dead/self-harm past 2 weeks Not at all Not at all Not at all     FIFI-7 SCORE 3/1/2022 4/28/2022 3/2/2023   Total Score - - -   Total Score 0 (minimal anxiety) - -   Total Score 0 2 0       Review of Systems   Constitutional, HEENT, cardiovascular, pulmonary, gi and gu systems are negative, except as otherwise noted.      Objective    BP (!) 144/84 (BP Location: Right arm, Patient Position: Sitting, Cuff Size: Adult Regular)   Pulse 66   Temp 98  F (36.7  C) (Skin)   Resp 15   Ht 1.727 m  "(5' 8\")   Wt 67.1 kg (148 lb)   LMP 11/19/2016 (Exact Date)   SpO2 98%   BMI 22.50 kg/m    Body mass index is 22.5 kg/m .  Physical Exam   GENERAL: healthy, alert and no distress  NECK: no adenopathy, no asymmetry, masses, or scars and thyroid normal to palpation  RESP: lungs clear to auscultation - no rales, rhonchi or wheezes  CV: regular rate and rhythm, normal S1 S2, no S3 or S4, no murmur, click or rub, no peripheral edema and peripheral pulses strong  ABDOMEN: soft, nontender, no hepatosplenomegaly, no masses and bowel sounds normal  MS: no gross musculoskeletal defects noted, no edema              "

## 2023-03-02 ENCOUNTER — OFFICE VISIT (OUTPATIENT)
Dept: FAMILY MEDICINE | Facility: CLINIC | Age: 58
End: 2023-03-02
Payer: COMMERCIAL

## 2023-03-02 VITALS
RESPIRATION RATE: 15 BRPM | DIASTOLIC BLOOD PRESSURE: 84 MMHG | TEMPERATURE: 98 F | BODY MASS INDEX: 22.43 KG/M2 | OXYGEN SATURATION: 98 % | HEART RATE: 66 BPM | WEIGHT: 148 LBS | HEIGHT: 68 IN | SYSTOLIC BLOOD PRESSURE: 144 MMHG

## 2023-03-02 DIAGNOSIS — F41.9 ANXIETY: Primary | ICD-10-CM

## 2023-03-02 DIAGNOSIS — A60.04 HERPES SIMPLEX VULVOVAGINITIS: ICD-10-CM

## 2023-03-02 DIAGNOSIS — Z23 NEED FOR DIPHTHERIA-TETANUS-PERTUSSIS (TDAP) VACCINE: ICD-10-CM

## 2023-03-02 DIAGNOSIS — N95.1 VASOMOTOR SYMPTOMS DUE TO MENOPAUSE: ICD-10-CM

## 2023-03-02 PROBLEM — G47.00 INSOMNIA, UNSPECIFIED TYPE: Status: RESOLVED | Noted: 2021-11-23 | Resolved: 2023-03-02

## 2023-03-02 RX ORDER — VALACYCLOVIR HYDROCHLORIDE 500 MG/1
500 TABLET, FILM COATED ORAL 2 TIMES DAILY
Qty: 6 TABLET | Refills: 5 | Status: SHIPPED | OUTPATIENT
Start: 2023-03-02 | End: 2023-10-06

## 2023-03-02 RX ORDER — ESCITALOPRAM OXALATE 10 MG/1
10 TABLET ORAL DAILY
Qty: 90 TABLET | Refills: 3 | Status: SHIPPED | OUTPATIENT
Start: 2023-03-02 | End: 2024-01-15

## 2023-03-02 RX ORDER — ESTRADIOL 0.05 MG/D
PATCH TRANSDERMAL
Qty: 12 PATCH | Refills: 11 | Status: SHIPPED | OUTPATIENT
Start: 2023-03-02 | End: 2024-05-23

## 2023-03-02 ASSESSMENT — ANXIETY QUESTIONNAIRES
IF YOU CHECKED OFF ANY PROBLEMS ON THIS QUESTIONNAIRE, HOW DIFFICULT HAVE THESE PROBLEMS MADE IT FOR YOU TO DO YOUR WORK, TAKE CARE OF THINGS AT HOME, OR GET ALONG WITH OTHER PEOPLE: NOT DIFFICULT AT ALL
5. BEING SO RESTLESS THAT IT IS HARD TO SIT STILL: NOT AT ALL
2. NOT BEING ABLE TO STOP OR CONTROL WORRYING: NOT AT ALL
7. FEELING AFRAID AS IF SOMETHING AWFUL MIGHT HAPPEN: NOT AT ALL
GAD7 TOTAL SCORE: 0
3. WORRYING TOO MUCH ABOUT DIFFERENT THINGS: NOT AT ALL
6. BECOMING EASILY ANNOYED OR IRRITABLE: NOT AT ALL
GAD7 TOTAL SCORE: 0
1. FEELING NERVOUS, ANXIOUS, OR ON EDGE: NOT AT ALL

## 2023-03-02 ASSESSMENT — PATIENT HEALTH QUESTIONNAIRE - PHQ9
SUM OF ALL RESPONSES TO PHQ QUESTIONS 1-9: 0
5. POOR APPETITE OR OVEREATING: NOT AT ALL

## 2023-03-02 NOTE — NURSING NOTE
Prior to immunization administration, verified patients identity using patient s name and date of birth. Please see Immunization Activity for additional information.     Screening Questionnaire for Adult Immunization    Are you sick today?   No   Do you have allergies to medications, food, a vaccine component or latex?   No   Have you ever had a serious reaction after receiving a vaccination?   No   Do you have a long-term health problem with heart, lung, kidney, or metabolic disease (e.g., diabetes), asthma, a blood disorder, no spleen, complement component deficiency, a cochlear implant, or a spinal fluid leak?  Are you on long-term aspirin therapy?   No   Do you have cancer, leukemia, HIV/AIDS, or any other immune system problem?   No   Do you have a parent, brother, or sister with an immune system problem?   No   In the past 3 months, have you taken medications that affect  your immune system, such as prednisone, other steroids, or anticancer drugs; drugs for the treatment of rheumatoid arthritis, Crohn s disease, or psoriasis; or have you had radiation treatments?   No   Have you had a seizure, or a brain or other nervous system problem?   No   During the past year, have you received a transfusion of blood or blood    products, or been given immune (gamma) globulin or antiviral drug?   No   For women: Are you pregnant or is there a chance you could become       pregnant during the next month?   No   Have you received any vaccinations in the past 4 weeks?   No     Immunization questionnaire answers were all negative.        Per orders of Dr. Gold, injection of Tdap given by Jesica Berg MA. Patient instructed to remain in clinic for 15 minutes afterwards, and to report any adverse reaction to me immediately.       Screening performed by Jesica Berg MA on 3/2/2023 at 12:08 PM.

## 2023-03-02 NOTE — NURSING NOTE
"57 year old  Chief Complaint   Patient presents with     Recheck Medication     Refills & Tdap vaccine.       Blood pressure (!) 144/84, pulse 66, temperature 98  F (36.7  C), temperature source Skin, resp. rate 15, height 1.727 m (5' 8\"), weight 67.1 kg (148 lb), last menstrual period 2016, SpO2 98 %, not currently breastfeeding. Body mass index is 22.5 kg/m .  Patient Active Problem List   Diagnosis     Herpes simplex vulvovaginitis     Adhesive capsulitis of left shoulder     Graves disease     Thyroid eye disease     Premenstrual dysphoric disorder in remission       Wt Readings from Last 2 Encounters:   23 67.1 kg (148 lb)   22 67.4 kg (148 lb 8 oz)     BP Readings from Last 3 Encounters:   23 (!) 144/84   22 128/82   22 121/82         Current Outpatient Medications   Medication     escitalopram (LEXAPRO) 10 MG tablet     estradiol (CLIMARA) 0.05 MG/24HR weekly patch     No current facility-administered medications for this visit.       Social History     Tobacco Use     Smoking status: Former     Packs/day: 0.50     Years: 20.00     Pack years: 10.00     Types: Cigarettes     Quit date: 2003     Years since quittin.0     Smokeless tobacco: Never   Vaping Use     Vaping Use: Never used   Substance Use Topics     Alcohol use: Yes     Comment: for several months no alcohol.  very little now     Drug use: No     Comment: CBD gummies.  doesn't help with sleep like hoped.       Health Maintenance Due   Topic Date Due     YEARLY PREVENTIVE VISIT  Never done     ADVANCE CARE PLANNING  Never done     HEPATITIS B IMMUNIZATION (1 of 3 - 3-dose series) Never done     HPV TEST  2021     PAP  2021     MAMMO SCREENING  2023       Lab Results   Component Value Date    PAP NIL 2018 11:21 AM    "

## 2023-04-03 ENCOUNTER — LAB (OUTPATIENT)
Dept: LAB | Facility: CLINIC | Age: 58
End: 2023-04-03
Payer: COMMERCIAL

## 2023-04-03 DIAGNOSIS — H57.89 THYROID EYE DISEASE: ICD-10-CM

## 2023-04-03 DIAGNOSIS — E07.9 THYROID EYE DISEASE: ICD-10-CM

## 2023-04-03 DIAGNOSIS — E05.00 GRAVES DISEASE: ICD-10-CM

## 2023-04-03 LAB
T3 SERPL-MCNC: 73 NG/DL (ref 85–202)
T4 FREE SERPL-MCNC: 1.05 NG/DL (ref 0.9–1.7)
TSH SERPL DL<=0.005 MIU/L-ACNC: 0.61 UIU/ML (ref 0.3–4.2)

## 2023-04-03 PROCEDURE — 84480 ASSAY TRIIODOTHYRONINE (T3): CPT

## 2023-04-03 PROCEDURE — 84443 ASSAY THYROID STIM HORMONE: CPT

## 2023-04-03 PROCEDURE — 84439 ASSAY OF FREE THYROXINE: CPT

## 2023-04-03 PROCEDURE — 36415 COLL VENOUS BLD VENIPUNCTURE: CPT

## 2023-04-16 ENCOUNTER — HEALTH MAINTENANCE LETTER (OUTPATIENT)
Age: 58
End: 2023-04-16

## 2023-06-02 ENCOUNTER — HEALTH MAINTENANCE LETTER (OUTPATIENT)
Age: 58
End: 2023-06-02

## 2023-06-28 ENCOUNTER — ANCILLARY PROCEDURE (OUTPATIENT)
Dept: MAMMOGRAPHY | Facility: CLINIC | Age: 58
End: 2023-06-28
Attending: FAMILY MEDICINE
Payer: COMMERCIAL

## 2023-06-28 DIAGNOSIS — Z12.31 VISIT FOR SCREENING MAMMOGRAM: ICD-10-CM

## 2023-06-28 PROCEDURE — 77067 SCR MAMMO BI INCL CAD: CPT | Mod: TC | Performed by: STUDENT IN AN ORGANIZED HEALTH CARE EDUCATION/TRAINING PROGRAM

## 2023-06-28 PROCEDURE — 77063 BREAST TOMOSYNTHESIS BI: CPT | Mod: TC | Performed by: STUDENT IN AN ORGANIZED HEALTH CARE EDUCATION/TRAINING PROGRAM

## 2023-09-13 ENCOUNTER — LAB (OUTPATIENT)
Dept: LAB | Facility: CLINIC | Age: 58
End: 2023-09-13
Payer: COMMERCIAL

## 2023-09-13 DIAGNOSIS — H57.89 THYROID EYE DISEASE: ICD-10-CM

## 2023-09-13 DIAGNOSIS — E07.9 THYROID EYE DISEASE: ICD-10-CM

## 2023-09-13 DIAGNOSIS — E05.00 GRAVES DISEASE: ICD-10-CM

## 2023-09-13 LAB
T3 SERPL-MCNC: 81 NG/DL (ref 85–202)
T4 FREE SERPL-MCNC: 1.08 NG/DL (ref 0.9–1.7)
TSH SERPL DL<=0.005 MIU/L-ACNC: 0.77 UIU/ML (ref 0.3–4.2)

## 2023-09-13 PROCEDURE — 84439 ASSAY OF FREE THYROXINE: CPT

## 2023-09-13 PROCEDURE — 84480 ASSAY TRIIODOTHYRONINE (T3): CPT

## 2023-09-13 PROCEDURE — 84443 ASSAY THYROID STIM HORMONE: CPT

## 2023-09-13 PROCEDURE — 36415 COLL VENOUS BLD VENIPUNCTURE: CPT

## 2023-09-14 NOTE — PROGRESS NOTES
"Endocrinology and Diabetes Clinic Visit    Subjective:   Kaylyn Fischer was seen today for a follow up visit for hyperthyroidism due to Graves Disease.     She was diagnosed with hyperthyroidism due to Graves disease in 2021. TSI was elevated (3.3, normal range less than 1.3). She had received recent IV contract and GOLDEN uptake and scan was not possible. Treatment with methimazole was initiated. She also has RAMAN and saw ophthalmology, has been treated with Tepezza (completed 2022). The Tepezza led to big improvements in her eye disease but with side effect of hearing changes which seem to be permanent.     Interval History:   She has not taken any methimazole since 2022. TFTs have remained within expected ranges, except for T3 which is mildly low. She feels well. No symptoms of thyroid dysfunction. Eyes are doing well.     Current Outpatient Medications   Medication    escitalopram (LEXAPRO) 10 MG tablet    estradiol (CLIMARA) 0.05 MG/24HR weekly patch    valACYclovir (VALTREX) 500 MG tablet     No current facility-administered medications for this visit.     MVI   Fish oil   Occasional magnesium   No biotin     Social History     Tobacco Use    Smoking status: Former     Packs/day: 0.50     Years: 20.00     Pack years: 10.00     Types: Cigarettes     Quit date: 2003     Years since quittin.6    Smokeless tobacco: Never   Substance Use Topics    Alcohol use: Yes     Comment: for several months no alcohol.  very little now       Review of Systems: A comprehensive ROS was negative except as noted in HPI.     Physical Examination:  Estimated body mass index is 23.87 kg/m  as calculated from the following:    Height as of this encounter: 1.727 m (5' 8\").    Weight as of this encounter: 71.2 kg (157 lb).    Wt Readings from Last 4 Encounters:   09/15/23 71.2 kg (157 lb)   23 67.1 kg (148 lb)   22 67.4 kg (148 lb 8 oz)   22 64.9 kg (143 lb)     Blood pressure 130/81, pulse 71, " "height 1.727 m (5' 8\"), weight 71.2 kg (157 lb), last menstrual period 11/19/2016, SpO2 99 %, not currently breastfeeding.    General: Healthy appearing, in no distress.   Eyes: EOMI without double vision. No lid lag or stare. Mild exophthalmos.  HENT: Thyroid gland is not significantly enlarged, smooth, no nodules palpated.   CV: RRR, with no murmur.  Respiratory: Lungs CTA bilaterally.   Extremities: No peripheral edema.   Skin: Skin temperature is normal.  Neurologic: 5/5 proximal muscle strength. No tremor with outstretched hands.     Lab Data:   Results for orders placed or performed in visit on 09/13/23 (from the past 48 hour(s))   TSH   Result Value Ref Range    TSH 0.77 0.30 - 4.20 uIU/mL   T4 free   Result Value Ref Range    Free T4 1.08 0.90 - 1.70 ng/dL   T3 total   Result Value Ref Range    T3 Total 81 (L) 85 - 202 ng/dL       Assessment and Plan:    Hyperthyroidism secondary to Graves Disease   - She is not currently taking methimazole and thyroid labs are stable  - Follow TFTs every 6-12 months, more often if she develops new symptoms. Standing order in place.     Thyroid eye disease   - Stable post Tepezza therapy     Anxiety and insomnia  - Improved as thyroid hormone levels normalized, and mood has remained stable     Follow up in clinic in one year.     Casandra Yousif MD  Endocrinology and Diabetes   [phone]      "

## 2023-09-15 ENCOUNTER — OFFICE VISIT (OUTPATIENT)
Dept: ENDOCRINOLOGY | Facility: CLINIC | Age: 58
End: 2023-09-15
Payer: COMMERCIAL

## 2023-09-15 VITALS
WEIGHT: 157 LBS | HEIGHT: 68 IN | BODY MASS INDEX: 23.79 KG/M2 | DIASTOLIC BLOOD PRESSURE: 81 MMHG | OXYGEN SATURATION: 99 % | HEART RATE: 71 BPM | SYSTOLIC BLOOD PRESSURE: 130 MMHG

## 2023-09-15 DIAGNOSIS — H57.89 THYROID EYE DISEASE: ICD-10-CM

## 2023-09-15 DIAGNOSIS — E07.9 THYROID EYE DISEASE: ICD-10-CM

## 2023-09-15 DIAGNOSIS — E05.00 GRAVES DISEASE: Primary | ICD-10-CM

## 2023-09-15 PROCEDURE — 99214 OFFICE O/P EST MOD 30 MIN: CPT | Performed by: INTERNAL MEDICINE

## 2023-09-15 ASSESSMENT — PAIN SCALES - GENERAL: PAINLEVEL: NO PAIN (0)

## 2023-09-15 NOTE — LETTER
9/15/2023       RE: Kaylyn Fischer  [address] [place] [address]     Dear Colleague,    Thank you for referring your patient, Kaylyn Fischer, to the [facility] ENDOCRINOLOGY [facility] [place] at [facility]. Please see a copy of my visit note below.    Endocrinology and Diabetes Clinic Visit    Subjective:   Kaylyn Fischer was seen today for a follow up visit for hyperthyroidism due to Graves Disease.     She was diagnosed with hyperthyroidism due to Graves disease in 2021. TSI was elevated (3.3, normal range less than 1.3). She had received recent IV contract and GOLDEN uptake and scan was not possible. Treatment with methimazole was initiated. She also has RAMAN and saw ophthalmology, has been treated with Tepezza (completed 2022). The Tepezza led to big improvements in her eye disease but with side effect of hearing changes which seem to be permanent.     Interval History:   She has not taken any methimazole since 2022. TFTs have remained within expected ranges, except for T3 which is mildly low. She feels well. No symptoms of thyroid dysfunction. Eyes are doing well.     Current Outpatient Medications   Medication    escitalopram (LEXAPRO) 10 MG tablet    estradiol (CLIMARA) 0.05 MG/24HR weekly patch    valACYclovir (VALTREX) 500 MG tablet     No current facility-administered medications for this visit.     MVI   Fish oil   Occasional magnesium   No biotin     Social History     Tobacco Use    Smoking status: Former     Packs/day: 0.50     Years: 20.00     Pack years: 10.00     Types: Cigarettes     Quit date: 2003     Years since quittin.6    Smokeless tobacco: Never   Substance Use Topics    Alcohol use: Yes     Comment: for several months no alcohol.  very little now       Review of Systems: A comprehensive ROS was negative except as noted in HPI.     Physical Examination:  Estimated body mass index is 23.87  "kg/m  as calculated from the following:    Height as of this encounter: 1.727 m (5' 8\").    Weight as of this encounter: 71.2 kg (157 lb).    Wt Readings from Last 4 Encounters:   09/15/23 71.2 kg (157 lb)   03/02/23 67.1 kg (148 lb)   09/02/22 67.4 kg (148 lb 8 oz)   04/28/22 64.9 kg (143 lb)     Blood pressure 130/81, pulse 71, height 1.727 m (5' 8\"), weight 71.2 kg (157 lb), last menstrual period 11/19/2016, SpO2 99 %, not currently breastfeeding.    General: Healthy appearing, in no distress.   Eyes: EOMI without double vision. No lid lag or stare. Mild exophthalmos.  HENT: Thyroid gland is not significantly enlarged, smooth, no nodules palpated.   CV: RRR, with no murmur.  Respiratory: Lungs CTA bilaterally.   Extremities: No peripheral edema.   Skin: Skin temperature is normal.  Neurologic: 5/5 proximal muscle strength. No tremor with outstretched hands.     Lab Data:   Results for orders placed or performed in visit on 09/13/23 (from the past 48 hour(s))   TSH   Result Value Ref Range    TSH 0.77 0.30 - 4.20 uIU/mL   T4 free   Result Value Ref Range    Free T4 1.08 0.90 - 1.70 ng/dL   T3 total   Result Value Ref Range    T3 Total 81 (L) 85 - 202 ng/dL       Assessment and Plan:    Hyperthyroidism secondary to Graves Disease   - She is not currently taking methimazole and thyroid labs are stable  - Follow TFTs every 6-12 months, more often if she develops new symptoms. Standing order in place.     Thyroid eye disease   - Stable post Tepezza therapy     Anxiety and insomnia  - Improved as thyroid hormone levels normalized, and mood has remained stable     Follow up in clinic in one year.     Casandra Yousif MD  Endocrinology and Diabetes   [phone]    "

## 2023-09-15 NOTE — NURSING NOTE
"Chief Complaint   Patient presents with    Thyroid Disease     Vital signs:      BP: 130/81 Pulse: 71     SpO2: 99 %     Height: 172.7 cm (5' 8\") Weight: 71.2 kg (157 lb)  Estimated body mass index is 23.87 kg/m  as calculated from the following:    Height as of this encounter: 1.727 m (5' 8\").    Weight as of this encounter: 71.2 kg (157 lb).        "

## 2023-10-06 DIAGNOSIS — A60.04 HERPES SIMPLEX VULVOVAGINITIS: Primary | ICD-10-CM

## 2023-10-06 RX ORDER — VALACYCLOVIR HYDROCHLORIDE 500 MG/1
500 TABLET, FILM COATED ORAL 2 TIMES DAILY
Qty: 6 TABLET | Refills: 5 | Status: SHIPPED | OUTPATIENT
Start: 2023-10-06

## 2023-10-06 NOTE — TELEPHONE ENCOUNTER
Valacyclovir (Valtrex) 500 mg    Last Office Visit: 3/2/23  Future [facility] Appointments: None  Medication last refilled: 3/2/23 #6 with 5 refill(s)    Vital Signs Systolic Diastolic   9/15/23 130 81   3/4/22 126 77   10/1/21 101 70     Required labs per protocol:    LAB REF RANGE 8/20/21   Creatinine 0.67-1.17 mg/dL 0.56     Prescription approved per [facility] Refill Protocol.    NAKUL RestrepoN, RN, CCM

## 2024-01-15 DIAGNOSIS — F41.9 ANXIETY: ICD-10-CM

## 2024-01-15 RX ORDER — ESCITALOPRAM OXALATE 10 MG/1
10 TABLET ORAL DAILY
Qty: 90 TABLET | Refills: 0 | Status: SHIPPED | OUTPATIENT
Start: 2024-01-15 | End: 2024-01-19

## 2024-01-15 NOTE — TELEPHONE ENCOUNTER
Medication requested: escitalopram (LEXAPRO) 10 MG tablet   Last office visit: 3/2/2023  [facility] appointments: none  Medication last refilled: 3/2/2023; 90 + 3 refills  Last qualifying labs:      3/2/2023  11:17 AM   PHQ-9 and GAD7 Scores    PHQ9 TOTAL SCORE    PHQ-9 Total Score 0    FIFI-7 Total Score 0      Medication is being filled for 1 time refill only due to:  Patient needs to be seen because due for mental health visit.    MC message sent to pt.    ZAHEER Vargas, RN  01/15/24, 3:37 PM

## 2024-01-18 NOTE — PROGRESS NOTES
Assessment & Plan   Problem List Items Addressed This Visit    None  Visit Diagnoses       Anxiety        Relevant Medications    escitalopram (LEXAPRO) 10 MG tablet           Med refilled as requested. Advised Kaylyn if she does want to try going off this medication I would not recommend doing it in January in [place]. She is considering tapering off this spring/summer but we will continue to discuss this going forward.     25 minutes spent on the date of the encounter doing chart review, history and exam, documentation and further activities as noted.        Subjective   Kaylyn is a 58 year old, presenting for the following health issues:  Recheck Medication    HPI   Med refills  Euthymia  - current meds    - lexapro 10mg daily  - feels the medication is helpful although has considered whether or not it continues to be necessary  - denies concerns for side effects  - due for in clinic follow up PHQ and FIFI surveys      4/28/2022     4:02 PM 3/2/2023    11:17 AM 1/19/2024     9:14 AM   PHQ   PHQ-9 Total Score 0 0 2   Q9: Thoughts of better off dead/self-harm past 2 weeks Not at all Not at all Not at all         4/28/2022     4:02 PM 3/2/2023    11:17 AM 1/19/2024     9:14 AM   FIFI-7 SCORE   Total Score 2 0 1         Review of Systems  Constitutional, HEENT, cardiovascular, pulmonary, gi and gu systems are negative, except as otherwise noted.      Objective    /78 (BP Location: Left arm, Patient Position: Sitting, Cuff Size: Adult Regular)   Pulse 71   Temp 97.5  F (36.4  C) (Temporal)   Resp 17   LMP 11/19/2016 (Exact Date)   SpO2 98%   There is no height or weight on file to calculate BMI.  Physical Exam   GENERAL: alert and no distress  NECK: no adenopathy, no asymmetry, masses, or scars  RESP: lungs clear to auscultation - no rales, rhonchi or wheezes  CV: regular rate and rhythm, normal S1 S2, no S3 or S4, no murmur, click or rub, no peripheral edema  ABDOMEN: soft, nontender, no  hepatosplenomegaly, no masses and bowel sounds normal  MS: no gross musculoskeletal defects noted, no edema          Signed Electronically by: Alberto Gold MD

## 2024-01-19 ENCOUNTER — OFFICE VISIT (OUTPATIENT)
Dept: FAMILY MEDICINE | Facility: CLINIC | Age: 59
End: 2024-01-19
Payer: COMMERCIAL

## 2024-01-19 VITALS
OXYGEN SATURATION: 98 % | DIASTOLIC BLOOD PRESSURE: 78 MMHG | RESPIRATION RATE: 17 BRPM | HEART RATE: 71 BPM | SYSTOLIC BLOOD PRESSURE: 126 MMHG | TEMPERATURE: 97.5 F

## 2024-01-19 DIAGNOSIS — F41.9 ANXIETY: ICD-10-CM

## 2024-01-19 PROBLEM — G25.89 SCAPULAR DYSKINESIS: Status: ACTIVE | Noted: 2023-10-17

## 2024-01-19 RX ORDER — ESCITALOPRAM OXALATE 10 MG/1
10 TABLET ORAL DAILY
Qty: 90 TABLET | Refills: 3 | Status: SHIPPED | OUTPATIENT
Start: 2024-01-19 | End: 2024-05-15

## 2024-01-19 ASSESSMENT — PATIENT HEALTH QUESTIONNAIRE - PHQ9
5. POOR APPETITE OR OVEREATING: NOT AT ALL
SUM OF ALL RESPONSES TO PHQ QUESTIONS 1-9: 2

## 2024-01-19 ASSESSMENT — ANXIETY QUESTIONNAIRES
3. WORRYING TOO MUCH ABOUT DIFFERENT THINGS: NOT AT ALL
GAD7 TOTAL SCORE: 1
1. FEELING NERVOUS, ANXIOUS, OR ON EDGE: NOT AT ALL
GAD7 TOTAL SCORE: 1
6. BECOMING EASILY ANNOYED OR IRRITABLE: SEVERAL DAYS
IF YOU CHECKED OFF ANY PROBLEMS ON THIS QUESTIONNAIRE, HOW DIFFICULT HAVE THESE PROBLEMS MADE IT FOR YOU TO DO YOUR WORK, TAKE CARE OF THINGS AT HOME, OR GET ALONG WITH OTHER PEOPLE: NOT DIFFICULT AT ALL
2. NOT BEING ABLE TO STOP OR CONTROL WORRYING: NOT AT ALL
5. BEING SO RESTLESS THAT IT IS HARD TO SIT STILL: NOT AT ALL
7. FEELING AFRAID AS IF SOMETHING AWFUL MIGHT HAPPEN: NOT AT ALL

## 2024-01-19 NOTE — NURSING NOTE
58 year old  Chief Complaint   Patient presents with    Recheck Medication       Blood pressure 126/78, pulse 71, temperature 97.5  F (36.4  C), temperature source Temporal, resp. rate 17, last menstrual period 2016, SpO2 98%, not currently breastfeeding. There is no height or weight on file to calculate BMI.  Patient Active Problem List   Diagnosis    Herpes simplex vulvovaginitis    Adhesive capsulitis of left shoulder    Graves disease    Thyroid eye disease    Premenstrual dysphoric disorder in remission       Wt Readings from Last 2 Encounters:   09/15/23 71.2 kg (157 lb)   23 67.1 kg (148 lb)     BP Readings from Last 3 Encounters:   24 126/78   09/15/23 130/81   23 (!) 144/84         Current Outpatient Medications   Medication    escitalopram (LEXAPRO) 10 MG tablet    estradiol (CLIMARA) 0.05 MG/24HR weekly patch    valACYclovir (VALTREX) 500 MG tablet     No current facility-administered medications for this visit.       Social History     Tobacco Use    Smoking status: Former     Packs/day: 0.50     Years: 20.00     Additional pack years: 0.00     Total pack years: 10.00     Types: Cigarettes     Quit date: 2003     Years since quittin.9    Smokeless tobacco: Never   Vaping Use    Vaping Use: Never used   Substance Use Topics    Alcohol use: Yes     Comment: for several months no alcohol.  very little now    Drug use: No     Comment: CBD gummies.  doesn't help with sleep like hoped.       Health Maintenance Due   Topic Date Due    YEARLY PREVENTIVE VISIT  Never done    ADVANCE CARE PLANNING  Never done    HEPATITIS B IMMUNIZATION (1 of 3 - 3-dose series) Never done    HPV TEST  2021    PAP  2021       Lab Results   Component Value Date    PAP NIL 2018 9:13 AM

## 2024-03-20 ENCOUNTER — OFFICE VISIT (OUTPATIENT)
Dept: FAMILY MEDICINE | Facility: CLINIC | Age: 59
End: 2024-03-20
Payer: COMMERCIAL

## 2024-03-20 VITALS
HEART RATE: 65 BPM | DIASTOLIC BLOOD PRESSURE: 79 MMHG | SYSTOLIC BLOOD PRESSURE: 125 MMHG | TEMPERATURE: 97.5 F | BODY MASS INDEX: 23.96 KG/M2 | OXYGEN SATURATION: 100 % | WEIGHT: 158.08 LBS | HEIGHT: 68 IN

## 2024-03-20 DIAGNOSIS — J02.9 VIRAL PHARYNGITIS: Primary | ICD-10-CM

## 2024-03-20 PROBLEM — G25.89 SCAPULAR DYSKINESIS: Status: RESOLVED | Noted: 2023-10-17 | Resolved: 2024-03-20

## 2024-03-20 RX ORDER — DOXYCYCLINE HYCLATE 100 MG
100 TABLET ORAL 2 TIMES DAILY
COMMUNITY
End: 2024-03-20

## 2024-03-20 RX ORDER — TRIAMCINOLONE ACETONIDE 1 MG/G
OINTMENT TOPICAL 2 TIMES DAILY
COMMUNITY
End: 2024-03-20

## 2024-03-20 NOTE — PROGRESS NOTES
"CC: Throat Problem (Sore throat started Sunday night, throat is really sore in the morning time. Pt also reports a cough.)      Subjective: Kaylyn Fischer is a 58 year old female who presents for evaluation of sore throat. Has been present for 3.5 days. Began as a scratchy throat on Sunday night, Monday woke up and very sore. Took some aspirin. Exhausted all day Monday. Tuesday still had a sore throat, but aspirin helped. Today woke up and symptoms were worse. Has a deep cough, with some phlegm production. Denies any fevers, chills, or sweats. No headache. Bilateral ear pain. Mild rhinorrhea. No nausea, vomiting, abdominal pain. No SOB. Denies any sick contacts although went to a high school basketball game on Friday and may have been exposed at that time. Has also tried Nyquil which helped. She did not test for Covid. Fully vaccinated against Covid & flu.       Objective:  /79   Pulse 65   Temp 97.5  F (36.4  C)   Ht 1.727 m (5' 7.99\")   Wt 71.7 kg (158 lb 1.3 oz)   LMP 11/19/2016 (Exact Date)   SpO2 100%   BMI 24.04 kg/m    General: Healthy, alert, and in no distress.   HEENT: Posterior orophyarynx slightly erythematous. No exudates.   Neck: No cervical adenopathy bilaterally.  CV: RRR, no murmur.  Chest: CTAB  Skin: No rashes or lesions.       Assessment/Plan:   Kaylyn was seen today for throat problem.    Diagnoses and all orders for this visit:    Viral pharyngitis    Symptomatic treatment discussed. Recommend Tylenol/ibuprofen, throat lozenges, tea with honey, gargling with salt water. Consider taking a home Covid test as well. If symptoms persisting 10 days or more, or worsening with SOB, fevers, chills, contact clinic for re-evaluation. Worrisome signs and symptoms were discussed with Kaylyn and she was instructed to return to the clinic for concerning symptoms or to call with questions.      Mariah Hernandez MD  Family Medicine    "

## 2024-03-27 ENCOUNTER — OFFICE VISIT (OUTPATIENT)
Dept: FAMILY MEDICINE | Facility: CLINIC | Age: 59
End: 2024-03-27
Payer: COMMERCIAL

## 2024-03-27 VITALS
OXYGEN SATURATION: 100 % | HEART RATE: 79 BPM | DIASTOLIC BLOOD PRESSURE: 85 MMHG | TEMPERATURE: 97.8 F | WEIGHT: 158 LBS | HEIGHT: 68 IN | BODY MASS INDEX: 23.95 KG/M2 | SYSTOLIC BLOOD PRESSURE: 133 MMHG

## 2024-03-27 DIAGNOSIS — J98.8 RESPIRATORY INFECTION: Primary | ICD-10-CM

## 2024-03-27 RX ORDER — AZITHROMYCIN 250 MG/1
TABLET, FILM COATED ORAL
Qty: 6 TABLET | Refills: 0 | Status: SHIPPED | OUTPATIENT
Start: 2024-03-27 | End: 2024-04-01

## 2024-03-27 RX ORDER — BENZONATATE 100 MG/1
100 CAPSULE ORAL 3 TIMES DAILY PRN
Qty: 30 CAPSULE | Refills: 0 | Status: SHIPPED | OUTPATIENT
Start: 2024-03-27 | End: 2024-05-15

## 2024-03-27 NOTE — PROGRESS NOTES
"CC: Follow Up (Pt reports her throat is better, but now has a cough that has flym coming up and its none stop.)      Subjective: Kaylyn Fischer is a 58 year old female who presents for follow-up of sore throat. She was seen on 3/20/2024 for sore throat.     At the time, reported:  \"Has been present for 3.5 days. Began as a scratchy throat on Sunday night, Monday woke up and very sore. Took some aspirin. Exhausted all day Monday. Tuesday still had a sore throat, but aspirin helped. Today woke up and symptoms were worse. Has a deep cough, with some phlegm production. Denies any fevers, chills, or sweats. No headache. Bilateral ear pain. Mild rhinorrhea. No nausea, vomiting, abdominal pain. No SOB. Denies any sick contacts although went to a high school basketball game on Friday and may have been exposed at that time. Has also tried Nyquil which helped. She did not test for Covid. Fully vaccinated against Covid & flu.\"    Since the last visit, Kaylyn reports her sore throat went away the next day, but now has a cough since last Friday with sputum production. No SOB. No wheezing. No CP. No fevers, chills, myalgias but does have sore muscles around the chest from the cough. Also notes some rhinorrhea over the weekend, but that has improved. Treatments tried: Sudafed and Nyquil which dries the cough out but still having a cough. Cough is worse with activity (such as snow shoveling). The cold air when she was shoveling did feel soothing.       Objective:  /85   Pulse 79   Temp 97.8  F (36.6  C)   Ht 1.727 m (5' 7.99\")   Wt 71.7 kg (158 lb)   LMP 11/19/2016 (Exact Date)   SpO2 100%   BMI 24.03 kg/m    General: Healthy, alert, and in no distress.   HEENT: Posterior orophyarynx slightly erythematous. No exudates.   Neck: No cervical adenopathy bilaterally.  CV: RRR, no murmur.  Chest: CTAB, diminished breath sounds in bases.   Skin: No rashes or lesions.       Assessment/Plan:   Kaylyn was seen today for follow " up.    Diagnoses and all orders for this visit:    Respiratory infection  -     benzonatate (TESSALON) 100 MG capsule; Take 1 capsule (100 mg) by mouth 3 times daily as needed  -     azithromycin (ZITHROMAX) 250 MG tablet; Take 2 tablets (500 mg) by mouth daily for 1 day, THEN 1 tablet (250 mg) daily for 4 days.    Kaylyn presents today because symptoms have been present for >10 days with a bothersome cough with sputum production. Lungs are clear with normal vitals. Will Rx Tessalon to be used TID prn for cough suppression and azithromycin antibiotic Rx due to duration of symptoms that may be bacterial in nature at this point. Continue Tylenol, ibuprofen prn, throat lozenges, and other symptomatic therapy.   Worrisome signs and symptoms were discussed with Kaylyn and she was instructed to return to the clinic for concerning symptoms or to call with questions.      Mariah Hernandez MD  Family Medicine

## 2024-05-14 NOTE — PROGRESS NOTES
"  Assessment & Plan   Problem List Items Addressed This Visit    None  Visit Diagnoses       Vasomotor symptoms due to menopause    -  Primary    Anxiety        Relevant Medications    escitalopram (LEXAPRO) 10 MG tablet           Discussed HRT options with Dr. Hernandez. Sent a message to Kaylyn recommending continued estrogen via patch as previously prescribed, but with added oral progesterone. Awaiting Kaylyn's response via [employer]. If she is agreeable I will refill the estradiol patch and plan on adding a 100mg progesterone capsule.     It looks like her lexapro was refilled in January of this year for a full year. I am resending the Rx today in case there is an issue with refills.     32 minutes spent on the date of the encounter doing chart review, history and exam, documentation and further activities as noted.    Alberto Gold MD  4:52 PM, May 15, 2024      Subjective   Kaylyn is a 58 year old, presenting for the following health issues:  Refill Request (Estradiol patch and lexapro)    HPI   Menopause/HRT  - current meds, estradiol patch weekly  - not currently on any sort of progesterone therapy  - uterus is intact  - denies any concerns for AUB    Anxiety and Depression  - current meds Lexapro 10mg daily  - feels this medication is helping, some times she isn't so sure  - no concerns for side effects      Review of Systems  Constitutional, HEENT, cardiovascular, pulmonary, gi and gu systems are negative, except as otherwise noted.      Objective    /77 (BP Location: Left arm, Patient Position: Sitting, Cuff Size: Adult Regular)   Pulse 63   Temp 97.9  F (36.6  C) (Skin)   Ht 1.727 m (5' 7.99\")   Wt 71.2 kg (157 lb)   LMP 11/19/2016 (Exact Date)   SpO2 96%   BMI 23.88 kg/m    Body mass index is 23.88 kg/m .  Physical Exam   GENERAL: alert and no distress  NECK: no adenopathy, no asymmetry, masses, or scars  RESP: lungs clear to auscultation - no rales, rhonchi or wheezes  CV: regular rate and rhythm, " normal S1 S2, no S3 or S4, no murmur, click or rub, no peripheral edema  ABDOMEN: soft, nontender, no hepatosplenomegaly, no masses and bowel sounds normal  MS: no gross musculoskeletal defects noted, no edema            Signed Electronically by: Alberto Gold MD    Answers submitted by the patient for this visit:  Patient Health Questionnaire (Submitted on 5/15/2024)  If you checked off any problems, how difficult have these problems made it for you to do your work, take care of things at home, or get along with other people?: Not difficult at all  PHQ9 TOTAL SCORE: 0  FIFI-7 (Submitted on 5/15/2024)  FIFI 7 TOTAL SCORE: 3

## 2024-05-15 ENCOUNTER — OFFICE VISIT (OUTPATIENT)
Dept: FAMILY MEDICINE | Facility: CLINIC | Age: 59
End: 2024-05-15
Payer: COMMERCIAL

## 2024-05-15 ENCOUNTER — MYC MEDICAL ADVICE (OUTPATIENT)
Dept: FAMILY MEDICINE | Facility: CLINIC | Age: 59
End: 2024-05-15

## 2024-05-15 VITALS
HEIGHT: 68 IN | SYSTOLIC BLOOD PRESSURE: 118 MMHG | TEMPERATURE: 97.9 F | WEIGHT: 157 LBS | HEART RATE: 63 BPM | DIASTOLIC BLOOD PRESSURE: 77 MMHG | BODY MASS INDEX: 23.79 KG/M2 | OXYGEN SATURATION: 96 %

## 2024-05-15 DIAGNOSIS — F41.9 ANXIETY: ICD-10-CM

## 2024-05-15 DIAGNOSIS — N95.1 VASOMOTOR SYMPTOMS DUE TO MENOPAUSE: Primary | ICD-10-CM

## 2024-05-15 DIAGNOSIS — N95.1 VASOMOTOR SYMPTOMS DUE TO MENOPAUSE: ICD-10-CM

## 2024-05-15 RX ORDER — ESCITALOPRAM OXALATE 10 MG/1
10 TABLET ORAL DAILY
Qty: 90 TABLET | Refills: 3 | Status: SHIPPED | OUTPATIENT
Start: 2024-05-15

## 2024-05-15 ASSESSMENT — ANXIETY QUESTIONNAIRES
2. NOT BEING ABLE TO STOP OR CONTROL WORRYING: NOT AT ALL
7. FEELING AFRAID AS IF SOMETHING AWFUL MIGHT HAPPEN: NOT AT ALL
1. FEELING NERVOUS, ANXIOUS, OR ON EDGE: NOT AT ALL
8. IF YOU CHECKED OFF ANY PROBLEMS, HOW DIFFICULT HAVE THESE MADE IT FOR YOU TO DO YOUR WORK, TAKE CARE OF THINGS AT HOME, OR GET ALONG WITH OTHER PEOPLE?: SOMEWHAT DIFFICULT
GAD7 TOTAL SCORE: 3
7. FEELING AFRAID AS IF SOMETHING AWFUL MIGHT HAPPEN: NOT AT ALL
3. WORRYING TOO MUCH ABOUT DIFFERENT THINGS: SEVERAL DAYS
GAD7 TOTAL SCORE: 3
6. BECOMING EASILY ANNOYED OR IRRITABLE: SEVERAL DAYS
GAD7 TOTAL SCORE: 3
4. TROUBLE RELAXING: SEVERAL DAYS
5. BEING SO RESTLESS THAT IT IS HARD TO SIT STILL: NOT AT ALL
IF YOU CHECKED OFF ANY PROBLEMS ON THIS QUESTIONNAIRE, HOW DIFFICULT HAVE THESE PROBLEMS MADE IT FOR YOU TO DO YOUR WORK, TAKE CARE OF THINGS AT HOME, OR GET ALONG WITH OTHER PEOPLE: SOMEWHAT DIFFICULT

## 2024-05-15 ASSESSMENT — PATIENT HEALTH QUESTIONNAIRE - PHQ9
10. IF YOU CHECKED OFF ANY PROBLEMS, HOW DIFFICULT HAVE THESE PROBLEMS MADE IT FOR YOU TO DO YOUR WORK, TAKE CARE OF THINGS AT HOME, OR GET ALONG WITH OTHER PEOPLE: NOT DIFFICULT AT ALL
SUM OF ALL RESPONSES TO PHQ QUESTIONS 1-9: 0
SUM OF ALL RESPONSES TO PHQ QUESTIONS 1-9: 0

## 2024-05-15 NOTE — NURSING NOTE
"58 year old  Chief Complaint   Patient presents with    Refill Request     Estradiol patch and lexapro       Blood pressure 118/77, pulse 63, temperature 97.9  F (36.6  C), temperature source Skin, height 1.727 m (5' 7.99\"), weight 71.2 kg (157 lb), last menstrual period 2016, SpO2 96%, not currently breastfeeding. Body mass index is 23.88 kg/m .  Patient Active Problem List   Diagnosis    Herpes simplex vulvovaginitis    Graves disease    Thyroid eye disease    Premenstrual dysphoric disorder in remission       Wt Readings from Last 2 Encounters:   05/15/24 71.2 kg (157 lb)   24 71.7 kg (158 lb)     BP Readings from Last 3 Encounters:   05/15/24 118/77   24 133/85   24 125/79         Current Outpatient Medications   Medication Sig Dispense Refill    escitalopram (LEXAPRO) 10 MG tablet Take 1 tablet (10 mg) by mouth daily 90 tablet 3    estradiol (CLIMARA) 0.05 MG/24HR weekly patch APPLY 1 PATCH TOPICALLY TO  SKIN ONCE A WEEK 12 patch 11    valACYclovir (VALTREX) 500 MG tablet Take 1 tablet (500 mg) by mouth 2 times daily 6 tablet 5    benzonatate (TESSALON) 100 MG capsule Take 1 capsule (100 mg) by mouth 3 times daily as needed 30 capsule 0     No current facility-administered medications for this visit.       Social History     Tobacco Use    Smoking status: Former     Current packs/day: 0.00     Average packs/day: 0.5 packs/day for 20.0 years (10.0 ttl pk-yrs)     Types: Cigarettes     Start date: 1983     Quit date: 2003     Years since quittin.2    Smokeless tobacco: Never   Vaping Use    Vaping status: Never Used   Substance Use Topics    Alcohol use: Yes     Comment: for several months no alcohol.  very little now    Drug use: No     Comment: CBD gummies.  doesn't help with sleep like hoped.       Health Maintenance Due   Topic Date Due    YEARLY PREVENTIVE VISIT  Never done    ADVANCE CARE PLANNING  Never done    HEPATITIS B IMMUNIZATION (1 of 3 - 19+ 3-dose series) Never " done    HPV TEST  04/20/2021    PAP  04/20/2021       Lab Results   Component Value Date    PAP NIL 04/20/2018         May 15, 2024 3:46 PM

## 2024-05-23 RX ORDER — ESTRADIOL 0.05 MG/D
PATCH TRANSDERMAL
Qty: 12 PATCH | Refills: 11 | Status: SHIPPED | OUTPATIENT
Start: 2024-05-23

## 2024-05-23 RX ORDER — PROGESTERONE 100 MG/1
100 CAPSULE ORAL DAILY
Qty: 90 CAPSULE | Refills: 3 | Status: SHIPPED | OUTPATIENT
Start: 2024-05-23

## 2024-06-22 ENCOUNTER — HEALTH MAINTENANCE LETTER (OUTPATIENT)
Age: 59
End: 2024-06-22

## 2024-08-21 ENCOUNTER — LAB (OUTPATIENT)
Dept: LAB | Facility: CLINIC | Age: 59
End: 2024-08-21
Payer: COMMERCIAL

## 2024-08-21 DIAGNOSIS — E07.9 THYROID EYE DISEASE: ICD-10-CM

## 2024-08-21 DIAGNOSIS — H57.89 THYROID EYE DISEASE: ICD-10-CM

## 2024-08-21 DIAGNOSIS — E05.00 GRAVES DISEASE: ICD-10-CM

## 2024-08-21 LAB
T3 SERPL-MCNC: 95 NG/DL (ref 85–202)
T4 FREE SERPL-MCNC: 1.17 NG/DL (ref 0.9–1.7)
TSH SERPL DL<=0.005 MIU/L-ACNC: 0.89 UIU/ML (ref 0.3–4.2)

## 2024-08-21 PROCEDURE — 84443 ASSAY THYROID STIM HORMONE: CPT

## 2024-08-21 PROCEDURE — 84439 ASSAY OF FREE THYROXINE: CPT

## 2024-08-21 PROCEDURE — 36415 COLL VENOUS BLD VENIPUNCTURE: CPT

## 2024-08-21 PROCEDURE — 84480 ASSAY TRIIODOTHYRONINE (T3): CPT

## 2024-10-10 NOTE — PROGRESS NOTES
Endocrinology and Diabetes Clinic Visit    Subjective:   Kaylyn Fischer was seen today for a follow up visit for hyperthyroidism due to Graves Disease.     She was diagnosed with hyperthyroidism due to Graves disease in 2021. TSI was elevated (3.3, normal range less than 1.3). She had received recent IV contract and GOLDEN uptake and scan was not possible. Treatment with methimazole was initiated. She also has RAMAN and saw ophthalmology, has been treated with Tepezza (completed 2022). The Tepezza led to big improvements in her eye disease but with side effect of hearing changes which seem to be permanent.     Interval History:   She has not taken any methimazole since 2022.   She feels well, no current symptoms attributable to the thyroid. Weight is stable.     Current Outpatient Medications   Medication Sig Dispense Refill    escitalopram (LEXAPRO) 10 MG tablet Take 1 tablet (10 mg) by mouth daily 90 tablet 3    estradiol (CLIMARA) 0.05 MG/24HR weekly patch APPLY 1 PATCH TOPICALLY TO  SKIN ONCE A WEEK 12 patch 11    progesterone (PROMETRIUM) 100 MG capsule Take 1 capsule (100 mg) by mouth daily 90 capsule 3    valACYclovir (VALTREX) 500 MG tablet Take 1 tablet (500 mg) by mouth 2 times daily 6 tablet 5     No current facility-administered medications for this visit.     MVI   Fish oil   Occasional magnesium   No biotin     Social History     Tobacco Use    Smoking status: Former     Current packs/day: 0.00     Average packs/day: 0.5 packs/day for 20.0 years (10.0 ttl pk-yrs)     Types: Cigarettes     Start date: 1983     Quit date: 2003     Years since quittin.6    Smokeless tobacco: Never   Substance Use Topics    Alcohol use: Yes     Comment: for several months no alcohol.  very little now       Review of Systems:   Went through menopause a few years ago.   Review of pertinent symptoms was negative except as noted in HPI.     Physical Examination:  Estimated body mass index is 24.38  "kg/m  as calculated from the following:    Height as of this encounter: 1.715 m (5' 7.5\").    Weight as of this encounter: 71.7 kg (158 lb).    Wt Readings from Last 4 Encounters:   10/11/24 71.7 kg (158 lb)   05/15/24 71.2 kg (157 lb)   03/27/24 71.7 kg (158 lb)   03/20/24 71.7 kg (158 lb 1.3 oz)     Blood pressure 127/83, pulse 61, height 1.715 m (5' 7.5\"), weight 71.7 kg (158 lb), last menstrual period 11/19/2016, SpO2 98%, not currently breastfeeding.    General: Healthy appearing, in no distress.   Eyes: EOMI without double vision. No lid lag or stare. Mild exophthalmos L.   HENT: Thyroid gland is not significantly enlarged, smooth, no nodules palpated.   CV: RRR, with no murmur.  Respiratory: Lungs CTA bilaterally.   Extremities: No peripheral edema.   Skin: Skin temperature is normal.  Neurologic: No tremor with outstretched hands.     Lab Data:   TSH   Date Value Ref Range Status   08/21/2024 0.89 0.30 - 4.20 uIU/mL Final   08/30/2022 0.97 0.40 - 4.00 mU/L Final   06/15/2015 0.89 0.40 - 4.00 mU/L Final     Component      Latest Ref Rng 8/21/2024  1:36 PM   TSH      0.30 - 4.20 uIU/mL 0.89    T4 Free      0.90 - 1.70 ng/dL 1.17    Triiodothyronine (T3)      85 - 202 ng/dL 95        Assessment and Plan:    Hyperthyroidism secondary to Graves Disease, in remission   - She is not currently taking methimazole and thyroid labs are stable  - Follow TFTs every 12 months at a minimum, more often if she develops new symptoms. Standing order in place for this.   - We reviewed possible future outcomes for Graves disease: continued remission, recurrence of hyperthyroidism, or development of hypothyroidism    Thyroid eye disease   - Stable post Tepezza therapy     Anxiety and insomnia  - Improved as thyroid hormone levels normalized, and mood has remained stable     Hearing loss   - She is due for audiology follow up and hearing aid check, and referral was provided.     She can continue to follow with me annually, or " alternatively with her PCP for TSH monitoring. If she does follow with her PCP, she should return to see me/contact me for any abnormal findings on thyroid function tests.       Casandra Yousif MD  Endocrinology and Diabetes   Office: [phone]   Clinic: [phone]

## 2024-10-11 ENCOUNTER — OFFICE VISIT (OUTPATIENT)
Dept: ENDOCRINOLOGY | Facility: CLINIC | Age: 59
End: 2024-10-11
Payer: COMMERCIAL

## 2024-10-11 VITALS
WEIGHT: 158 LBS | OXYGEN SATURATION: 98 % | BODY MASS INDEX: 23.95 KG/M2 | SYSTOLIC BLOOD PRESSURE: 127 MMHG | HEART RATE: 61 BPM | DIASTOLIC BLOOD PRESSURE: 83 MMHG | HEIGHT: 68 IN

## 2024-10-11 DIAGNOSIS — H57.89 THYROID EYE DISEASE: ICD-10-CM

## 2024-10-11 DIAGNOSIS — E05.00 GRAVES DISEASE: Primary | ICD-10-CM

## 2024-10-11 DIAGNOSIS — H90.3 ASYMMETRICAL SENSORINEURAL HEARING LOSS: ICD-10-CM

## 2024-10-11 DIAGNOSIS — E07.9 THYROID EYE DISEASE: ICD-10-CM

## 2024-10-11 PROCEDURE — 99214 OFFICE O/P EST MOD 30 MIN: CPT | Performed by: INTERNAL MEDICINE

## 2024-10-11 ASSESSMENT — PAIN SCALES - GENERAL: PAINLEVEL: NO PAIN (0)

## 2024-10-11 NOTE — LETTER
10/11/2024       RE: Kaylyn Fischer  [address] [place] [address]     Dear Colleague,    Thank you for referring your patient, Kaylyn Fischer, to the [facility] ENDOCRINOLOGY CLINIC [place] at [facility]. Please see a copy of my visit note below.    09/24/24 10:27 AM  PATIENT LAB/IMAGING STATUS : Orders completed / No further action needed       Endocrinology and Diabetes Clinic Visit    Subjective:   Kaylyn Fischer was seen today for a follow up visit for hyperthyroidism due to Graves Disease.     She was diagnosed with hyperthyroidism due to Graves disease in August 2021. TSI was elevated (3.3, normal range less than 1.3). She had received recent IV contract and GOLDEN uptake and scan was not possible. Treatment with methimazole was initiated. She also has RAMAN and saw ophthalmology, has been treated with Tepezza (completed March 2022). The Tepezza led to big improvements in her eye disease but with side effect of hearing changes which seem to be permanent.     Interval History:   She has not taken any methimazole since January 2022.   She feels well, no current symptoms attributable to the thyroid. Weight is stable.     Current Outpatient Medications   Medication Sig Dispense Refill     escitalopram (LEXAPRO) 10 MG tablet Take 1 tablet (10 mg) by mouth daily 90 tablet 3     estradiol (CLIMARA) 0.05 MG/24HR weekly patch APPLY 1 PATCH TOPICALLY TO  SKIN ONCE A WEEK 12 patch 11     progesterone (PROMETRIUM) 100 MG capsule Take 1 capsule (100 mg) by mouth daily 90 capsule 3     valACYclovir (VALTREX) 500 MG tablet Take 1 tablet (500 mg) by mouth 2 times daily 6 tablet 5     No current facility-administered medications for this visit.     MVI   Fish oil   Occasional magnesium   No biotin     Social History     Tobacco Use     Smoking status: Former     Current packs/day: 0.00     Average packs/day: 0.5 packs/day for 20.0 years (10.0 ttl pk-yrs)  "    Types: Cigarettes     Start date: 1983     Quit date: 2003     Years since quittin.6     Smokeless tobacco: Never   Substance Use Topics     Alcohol use: Yes     Comment: for several months no alcohol.  very little now       Review of Systems:   Went through menopause a few years ago.   Review of pertinent symptoms was negative except as noted in HPI.     Physical Examination:  Estimated body mass index is 24.38 kg/m  as calculated from the following:    Height as of this encounter: 1.715 m (5' 7.5\").    Weight as of this encounter: 71.7 kg (158 lb).    Wt Readings from Last 4 Encounters:   10/11/24 71.7 kg (158 lb)   05/15/24 71.2 kg (157 lb)   24 71.7 kg (158 lb)   24 71.7 kg (158 lb 1.3 oz)     Blood pressure 127/83, pulse 61, height 1.715 m (5' 7.5\"), weight 71.7 kg (158 lb), last menstrual period 2016, SpO2 98%, not currently breastfeeding.    General: Healthy appearing, in no distress.   Eyes: EOMI without double vision. No lid lag or stare. Mild exophthalmos L.   HENT: Thyroid gland is not significantly enlarged, smooth, no nodules palpated.   CV: RRR, with no murmur.  Respiratory: Lungs CTA bilaterally.   Extremities: No peripheral edema.   Skin: Skin temperature is normal.  Neurologic: No tremor with outstretched hands.     Lab Data:   TSH   Date Value Ref Range Status   2024 0.89 0.30 - 4.20 uIU/mL Final   2022 0.97 0.40 - 4.00 mU/L Final   06/15/2015 0.89 0.40 - 4.00 mU/L Final     Component      Latest Ref Rng 2024  1:36 PM   TSH      0.30 - 4.20 uIU/mL 0.89    T4 Free      0.90 - 1.70 ng/dL 1.17    Triiodothyronine (T3)      85 - 202 ng/dL 95        Assessment and Plan:    Hyperthyroidism secondary to Graves Disease, in remission   - She is not currently taking methimazole and thyroid labs are stable  - Follow TFTs every 12 months at a minimum, more often if she develops new symptoms. Standing order in place for this.   - We reviewed possible future " outcomes for Graves disease: continued remission, recurrence of hyperthyroidism, or development of hypothyroidism    Thyroid eye disease   - Stable post Tepezza therapy     Anxiety and insomnia  - Improved as thyroid hormone levels normalized, and mood has remained stable     Hearing loss   - She is due for audiology follow up and hearing aid check, and referral was provided.     She can continue to follow with me annually, or alternatively with her PCP for TSH monitoring. If she does follow with her PCP, she should return to see me/contact me for any abnormal findings on thyroid function tests.       Casandra Yousif MD  Endocrinology and Diabetes   Office: [phone]   Clinic: [phone]          Again, thank you for allowing me to participate in the care of your patient.      Sincerely,    Casandra Yousif MD

## 2024-11-18 ENCOUNTER — ANCILLARY PROCEDURE (OUTPATIENT)
Dept: MAMMOGRAPHY | Facility: CLINIC | Age: 59
End: 2024-11-18
Attending: FAMILY MEDICINE
Payer: COMMERCIAL

## 2024-11-18 DIAGNOSIS — Z12.31 VISIT FOR SCREENING MAMMOGRAM: ICD-10-CM

## 2024-11-18 PROCEDURE — 77063 BREAST TOMOSYNTHESIS BI: CPT | Mod: TC | Performed by: RADIOLOGY

## 2024-11-18 PROCEDURE — 77067 SCR MAMMO BI INCL CAD: CPT | Mod: TC | Performed by: RADIOLOGY

## 2025-01-29 ENCOUNTER — TRANSFERRED RECORDS (OUTPATIENT)
Dept: HEALTH INFORMATION MANAGEMENT | Facility: CLINIC | Age: 60
End: 2025-01-29
Payer: COMMERCIAL

## 2025-02-04 ENCOUNTER — OFFICE VISIT (OUTPATIENT)
Dept: AUDIOLOGY | Facility: CLINIC | Age: 60
End: 2025-02-04
Payer: COMMERCIAL

## 2025-02-04 DIAGNOSIS — H90.3 ASYMMETRICAL SENSORINEURAL HEARING LOSS: Primary | ICD-10-CM

## 2025-02-04 NOTE — PROGRESS NOTES
AUDIOLOGY REPORT    SUBJECTIVE:   Kaylyn Fischer is a 59 year old female who was seen in the Audiology Clinic at [facility] on 2/4/25 for a hearing aid check. The patient has been seen previously in this clinic and was fit with a left Unitron AU2 550 R Li hearing aid on 2/11/2021.  Kaylyn reports she recently had a hearing [facility] after a decrease in left hearing due to use of ototoxic Teprezza. Recent test results indicate a normal to mild sensorineural hearing loss in the right ear and a normal to profound sensorineural hearing loss in the left. She reports today that she would like her left hearing aid reprogrammed.    OBJECTIVE: The left hearing aid was reprogrammed to NAL-NL1 prescriptive targets using simulated real ear. After she reported the sound was tinny therefore high frequency gain was decreased by 3 dB which she reported was better.    ASSESSMENT:   A hearing aid check was completed today.    PLAN:   Kaylyn will return for follow-up as needed. Please call this clinic with any questions regarding today s appointment.    Sharona Owens, [facility]  Licensed Audiologist  [place] License #[id number]

## 2025-04-16 ENCOUNTER — NURSE TRIAGE (OUTPATIENT)
Dept: FAMILY MEDICINE | Facility: CLINIC | Age: 60
End: 2025-04-16

## 2025-04-16 NOTE — TELEPHONE ENCOUNTER
"Pt called to discuss heart palpitations and was forwarded to triage prior to scheduling appt.  Scheduled on 4/28/2025.    Reason for Disposition   Palpitations are a chronic symptom (recurrent or ongoing AND present > 4 weeks)    Answer Assessment - Initial Assessment Questions  1. DESCRIPTION:           \"Palpitations,\" her sister has diagnosis of PVCs, describes as skipped beats \"dub, dub........dub\"    2. ONSET:      Two months    3. DURATION:       Hours    4. PATTERN:       Comes and goes, used to think it was related to estradiol patch because it always started after dinner, but does not think it is related now.    5. TAP: \"Using your hand, can you tap out what you are feeling on a chair or table in front of you, so that I can hear?\" (Note: not all patients can do this)        Dub, dub,................dub    6. HEART RATE: \"Can you tell me your heart rate?\" \"How many beats in 15 seconds?\"  (Note: not all patients can do this)        21    7. RECURRENT SYMPTOM:       No    8. CAUSE:       PVC; same as what her sister experiences    9. CARDIAC HISTORY:       None    10. OTHER SYMPTOMS:         No, occasionally may be causing her to be lightheaded    Protocols used: Heart Rate and Heartbeat [id number] Alvarenga, RN  04/16/25, 10:35 AM    "

## 2025-04-28 ENCOUNTER — OFFICE VISIT (OUTPATIENT)
Dept: FAMILY MEDICINE | Facility: CLINIC | Age: 60
End: 2025-04-28
Payer: COMMERCIAL

## 2025-04-28 VITALS
DIASTOLIC BLOOD PRESSURE: 69 MMHG | TEMPERATURE: 97.9 F | OXYGEN SATURATION: 99 % | SYSTOLIC BLOOD PRESSURE: 126 MMHG | HEART RATE: 67 BPM | RESPIRATION RATE: 14 BRPM

## 2025-04-28 DIAGNOSIS — R00.2 PALPITATIONS: Primary | ICD-10-CM

## 2025-04-28 NOTE — NURSING NOTE
Kaylyn  59 year old    Chief Complaint   Patient presents with    Palpitations     Triaged on 25 - no changes since triaged, still experiencing sx daily            Blood pressure 126/69, pulse 67, temperature 97.9  F (36.6  C), temperature source Skin, resp. rate 14, last menstrual period 2016, SpO2 99%, not currently breastfeeding. There is no height or weight on file to calculate BMI.    Patient Active Problem List   Diagnosis    Herpes simplex vulvovaginitis    Graves disease    Thyroid eye disease    Premenstrual dysphoric disorder in remission              Wt Readings from Last 2 Encounters:   10/11/24 71.7 kg (158 lb)   05/15/24 71.2 kg (157 lb)       BP Readings from Last 3 Encounters:   25 126/69   10/11/24 127/83   05/15/24 118/77                Current Outpatient Medications   Medication Sig Dispense Refill    escitalopram (LEXAPRO) 10 MG tablet Take 1 tablet (10 mg) by mouth daily 90 tablet 3    estradiol (CLIMARA) 0.05 MG/24HR weekly patch APPLY 1 PATCH TOPICALLY TO  SKIN ONCE A WEEK 12 patch 11    progesterone (PROMETRIUM) 100 MG capsule Take 1 capsule (100 mg) by mouth daily 90 capsule 3    valACYclovir (VALTREX) 500 MG tablet Take 1 tablet (500 mg) by mouth 2 times daily 6 tablet 5     No current facility-administered medications for this visit.              Social History     Tobacco Use    Smoking status: Former     Current packs/day: 0.00     Average packs/day: 0.5 packs/day for 20.0 years (10.0 ttl pk-yrs)     Types: Cigarettes     Start date: 1983     Quit date: 2003     Years since quittin.2    Smokeless tobacco: Never   Vaping Use    Vaping status: Never Used   Substance Use Topics    Alcohol use: Yes     Comment: for several months no alcohol.  very little now    Drug use: No     Comment: CBD gummies.  doesn't help with sleep like hoped.              Health Maintenance Due   Topic Date Due    ADVANCE CARE PLANNING  Never done    YEARLY PREVENTIVE VISIT  Never done     HEPATITIS B IMMUNIZATION (1 of 3 - 19+ 3-dose series) Never done    Pneumococcal Vaccine: 50+ Years (1 of 1 - PCV) Never done    HPV TEST  04/20/2021    PAP  04/20/2021    COVID-19 Vaccine (6 - 2024-25 season) 09/01/2024    DIABETES SCREENING  12/08/2024    PHQ-2 (once per calendar year)  01/01/2025              Lab Results   Component Value Date    PAP NIL 04/20/2018 April 28, 2025 2:33 PM

## 2025-04-28 NOTE — NURSING NOTE
Kaylyn Fiscehr arrived here on 4/28/2025 2:52 PM for 3-7 Days  Zio monitor placement per ordering provider Dr. Alberto Gold for the diagnosis: Palpitations.  Patient s skin was prepped per protocol. Dr. Gold is the supervising MD.  Zio monitor was placed.  Instructions were reviewed with and given to the patient.  Patient verbalized understanding of wear, troubleshooting and monitor return instructions.

## 2025-04-28 NOTE — PROGRESS NOTES
"  Assessment & Plan   Problem List Items Addressed This Visit    None  Visit Diagnoses       Palpitations    -  Primary    Relevant Orders    ZIO PATCH 3-7 DAYS (additional cost to patient) (Completed)    ZIO PATCH 3-7 DAYS APPLICATION (Completed)           Cardiac auscultation unremarkable in clinic today and Kaylyn denies current symptoms. I do not think an EKG would be helpful today but given reported history I am OK with placing a Zio patch for the next week. Encouraged Kaylyn to contact me if symptoms worsen acutely during the next week. Once we have results from the Zio patch we can consider possible next steps.     The longitudinal plan of care for the diagnosis(es)/condition(s) as documented were addressed during this visit. Due to the added complexity in care, I will continue to support Kaylyn in the subsequent management and with ongoing continuity of care.    35 minutes spent on the date of the encounter doing chart review, history and exam, documentation and further activities as noted.    Alberto Gold MD  3:05 PM, April 28, 2025      Renu Patiño is a 59 year old, presenting for the following health issues:  No chief complaint on file.    HPI    \"Heart palpitations\"  - see triage note from 4/16/25  - history notable for Graves disease, most recent labs from last August were normal  - as noted in triage note, Kaylyn reports sensation of missed heart beats, daily, that can last for hours  - she denies any current symptoms   - no dyspnea  - no LE edema      Review of Systems  Constitutional, HEENT, cardiovascular, pulmonary, gi and gu systems are negative, except as otherwise noted.      Objective    /69 (BP Location: Left arm, Patient Position: Sitting, Cuff Size: Adult Regular)   Pulse 67   Temp 97.9  F (36.6  C) (Skin)   Resp 14   LMP 11/19/2016 (Exact Date)   SpO2 99%   There is no height or weight on file to calculate BMI.    Physical Exam   GENERAL: alert and no distress  NECK: no " adenopathy, no asymmetry, masses, or scars  RESP: lungs clear to auscultation - no rales, rhonchi or wheezes  CV: regular rate and rhythm, normal S1 S2, no S3 or S4, no murmur, click or rub, no peripheral edema  ABDOMEN: soft, nontender, no hepatosplenomegaly, no masses and bowel sounds normal  MS: no gross musculoskeletal defects noted, no edema          Signed Electronically by: Alberto Gold MD

## 2025-05-12 LAB — CV ZIO PRELIM RESULTS: NORMAL

## 2025-05-13 ENCOUNTER — RESULTS FOLLOW-UP (OUTPATIENT)
Dept: FAMILY MEDICINE | Facility: CLINIC | Age: 60
End: 2025-05-13

## 2025-07-12 ENCOUNTER — HEALTH MAINTENANCE LETTER (OUTPATIENT)
Age: 60
End: 2025-07-12

## (undated) RX ORDER — FENTANYL CITRATE 50 UG/ML
INJECTION, SOLUTION INTRAMUSCULAR; INTRAVENOUS
Status: DISPENSED
Start: 2021-08-27